# Patient Record
Sex: FEMALE | Race: WHITE | NOT HISPANIC OR LATINO | Employment: OTHER | URBAN - METROPOLITAN AREA
[De-identification: names, ages, dates, MRNs, and addresses within clinical notes are randomized per-mention and may not be internally consistent; named-entity substitution may affect disease eponyms.]

---

## 2017-02-05 RX ORDER — LOSARTAN POTASSIUM 25 MG/1
TABLET ORAL
Qty: 30 TABLET | Refills: 9 | Status: SHIPPED | OUTPATIENT
Start: 2017-02-05 | End: 2017-09-07 | Stop reason: SDUPTHER

## 2017-05-15 ENCOUNTER — PATIENT MESSAGE (OUTPATIENT)
Dept: ENDOCRINOLOGY | Facility: CLINIC | Age: 67
End: 2017-05-15

## 2017-05-15 DIAGNOSIS — E03.9 HYPOTHYROIDISM, UNSPECIFIED TYPE: Primary | ICD-10-CM

## 2017-05-16 ENCOUNTER — TELEPHONE (OUTPATIENT)
Dept: ENDOCRINOLOGY | Facility: CLINIC | Age: 67
End: 2017-05-16

## 2017-05-16 NOTE — TELEPHONE ENCOUNTER
Pt was scheduled for August w/ Dr. Walsh w/ labs/US prior.  Pt states she's been feeling more tired than usual and would like levels checked.  TSH ordered for tomorrow.  Will call pt w/ results.  Pt verbalized understanding.

## 2017-05-17 ENCOUNTER — LAB VISIT (OUTPATIENT)
Dept: LAB | Facility: HOSPITAL | Age: 67
End: 2017-05-17
Attending: INTERNAL MEDICINE
Payer: COMMERCIAL

## 2017-05-17 DIAGNOSIS — I10 ESSENTIAL HYPERTENSION: ICD-10-CM

## 2017-05-17 LAB — TSH SERPL DL<=0.005 MIU/L-ACNC: 1.76 UIU/ML

## 2017-05-17 PROCEDURE — 84443 ASSAY THYROID STIM HORMONE: CPT

## 2017-05-17 PROCEDURE — 36415 COLL VENOUS BLD VENIPUNCTURE: CPT | Mod: PO

## 2017-06-11 RX ORDER — PANTOPRAZOLE SODIUM 40 MG/1
TABLET, DELAYED RELEASE ORAL
Qty: 30 TABLET | Refills: 5 | Status: SHIPPED | OUTPATIENT
Start: 2017-06-11 | End: 2018-01-29 | Stop reason: SDUPTHER

## 2017-07-06 ENCOUNTER — OFFICE VISIT (OUTPATIENT)
Dept: INTERNAL MEDICINE | Facility: CLINIC | Age: 67
End: 2017-07-06
Payer: MEDICARE

## 2017-07-06 VITALS
HEIGHT: 66 IN | HEART RATE: 73 BPM | OXYGEN SATURATION: 98 % | RESPIRATION RATE: 16 BRPM | SYSTOLIC BLOOD PRESSURE: 132 MMHG | WEIGHT: 148.56 LBS | BODY MASS INDEX: 23.88 KG/M2 | TEMPERATURE: 98 F | DIASTOLIC BLOOD PRESSURE: 78 MMHG

## 2017-07-06 DIAGNOSIS — I70.0 AORTIC ATHEROSCLEROSIS: ICD-10-CM

## 2017-07-06 DIAGNOSIS — Z78.0 ASYMPTOMATIC MENOPAUSAL STATE: ICD-10-CM

## 2017-07-06 DIAGNOSIS — Z00.00 HEALTH CARE MAINTENANCE: Primary | ICD-10-CM

## 2017-07-06 DIAGNOSIS — K21.9 GASTROESOPHAGEAL REFLUX DISEASE, ESOPHAGITIS PRESENCE NOT SPECIFIED: ICD-10-CM

## 2017-07-06 DIAGNOSIS — Z12.31 ENCOUNTER FOR SCREENING MAMMOGRAM FOR MALIGNANT NEOPLASM OF BREAST: ICD-10-CM

## 2017-07-06 PROCEDURE — 90670 PCV13 VACCINE IM: CPT | Mod: PBBFAC,PO

## 2017-07-06 PROCEDURE — 99213 OFFICE O/P EST LOW 20 MIN: CPT | Mod: PBBFAC,PO | Performed by: INTERNAL MEDICINE

## 2017-07-06 PROCEDURE — G0438 PPPS, INITIAL VISIT: HCPCS | Mod: ,,, | Performed by: INTERNAL MEDICINE

## 2017-07-06 PROCEDURE — 99999 PR PBB SHADOW E&M-EST. PATIENT-LVL III: CPT | Mod: PBBFAC,,, | Performed by: INTERNAL MEDICINE

## 2017-07-06 PROCEDURE — G0009 ADMIN PNEUMOCOCCAL VACCINE: HCPCS | Mod: PBBFAC

## 2017-07-06 NOTE — PROGRESS NOTES
"HISTORY OF PRESENT ILLNESS:  Pt. is a 66 y.o. female presents for monitoring of her HTN, GERD.  She also had been to have her sleep study for ANG/now on CPAP.  Her "numbers look good", much improved from previous.  She is due for her mammogram, will be coming due for BMD, colonoscopy done 6/2/15.  She is UTD with Tdap, done 6/28/16.  She is due for pneumonia vaccines.  She was supposed to see Dr. Verona Gupta, but do not see that she had appt.  She had problems and had to postpone visit.  Pap was done 1/12/16.  Colonoscopy done 6/2/15 through Dr. Heller.  She has recently contacted Dr. Walsh for her followup of her MNG, had been feeling tired, but TSH was WNL.  She has history of atherosclerosis of the aorta, last .    Lab Results   Component Value Date    WBC 6.20 12/19/2016    HGB 13.2 12/19/2016    HCT 41.2 12/19/2016     12/19/2016    CHOL 213 (H) 12/19/2016    TRIG 55 12/19/2016    HDL 70 12/19/2016    ALT 9 (L) 12/19/2016    AST 17 12/19/2016     12/19/2016    K 3.9 12/19/2016     12/19/2016    CREATININE 0.9 12/19/2016    BUN 10 12/19/2016    CO2 25 12/19/2016    TSH 1.762 05/17/2017    INR 1.0 07/11/2012     Lab Results   Component Value Date    LDLCALC 132.0 12/19/2016             ROS:  GENERAL: No fever, chills, fatigability or weight loss.  SKIN: No rashes, itching or changes in color or texture of skin.  HEAD: No headaches or recent head trauma.  EARS: Denies ear pain, discharge or vertigo.  NOSE: No loss of smell, no epistaxis or postnasal drip.  MOUTH & THROAT: No hoarseness or change in voice. No excessive gum bleeding.  NODES: Denies swollen glands.  CHEST: Denies RODRIGUEZ, cyanosis, wheezing, cough and sputum production.  CARDIOVASCULAR: Denies chest pain, PND, orthopnea or reduced exercise tolerance.  ABDOMEN: Appetite fine. No weight loss. Denies constipation, diarrhea, abdominal pain, hematemesis or blood in stool.  URINARY: No flank pain, dysuria or hematuria.  PERIPHERAL " VASCULAR: No claudication or cyanosis. No edema.  MUSCULOSKELETAL: No joint stiffness or swelling. Denies back pain.  NEUROLOGIC: Denies numbness    PE:   Vitals:   Vitals:    07/06/17 1755   BP: 132/78   Pulse: 73   Resp: 16   Temp: 97.7 °F (36.5 °C)     GENERAL: no acute distress, A&Ox3, comfortable.  Female with BMI of 23   HEENT: tympanic membranes clear, nasal mucosa pink, no pharyngeal erythema or exudate  NECK: supple, no cervical lymphadenopathy, no thyromegaly; no supraclavicular nodes;   CHEST:  clear to auscultation bilaterally, no crackles or wheeze; no increased work of breathing;  CARDIOVASCULAR: regular rate and rhythm, no rubs, murmurs or gallops.  ABDOMEN: normal bowel sounds, soft non-tender, non-distended; no palpable organomegaly;   EXT: no clubbing, cyanosis or edema.     ASSESSMENT/PLAN:    Health care maintenance  -     Mammo Digital Screening Bilat with CAD; Future; Expected date: 07/06/2017  -     DXA Bone Density Spine And Hip; Future; Expected date: 10/27/2017  -     (In Office Administered) Pneumococcal Conjugate Vaccine (13 Valent) (IM)    Encounter for screening mammogram for malignant neoplasm of breast   -     Mammo Digital Screening Bilat with CAD; Future; Expected date: 07/06/2017    Asymptomatic menopausal state   -     DXA Bone Density Spine And Hip; Future; Expected date: 10/27/2017      Call if condition changes or worsens.    Answers for HPI/ROS submitted by the patient on 7/3/2017   activity change: No  unexpected weight change: No  neck pain: No  hearing loss: No  rhinorrhea: No  trouble swallowing: No  eye discharge: No  visual disturbance: No  chest tightness: No  wheezing: No  chest pain: No  palpitations: No  blood in stool: No  constipation: No  vomiting: No  diarrhea: No  polydipsia: No  polyuria: No  difficulty urinating: No  hematuria: No  menstrual problem: No  dysuria: No  joint swelling: No  arthralgias: No  headaches: No  weakness: No  confusion: No  dysphoric  mood: No

## 2017-08-09 ENCOUNTER — HOSPITAL ENCOUNTER (OUTPATIENT)
Dept: RADIOLOGY | Facility: HOSPITAL | Age: 67
Discharge: HOME | End: 2017-08-09
Attending: INTERNAL MEDICINE
Payer: MEDICARE

## 2017-08-09 DIAGNOSIS — Z12.31 ENCOUNTER FOR SCREENING MAMMOGRAM FOR MALIGNANT NEOPLASM OF BREAST: ICD-10-CM

## 2017-08-09 DIAGNOSIS — Z00.00 HEALTH CARE MAINTENANCE: ICD-10-CM

## 2017-08-09 PROCEDURE — 77067 SCR MAMMO BI INCL CAD: CPT | Mod: 26,,, | Performed by: RADIOLOGY

## 2017-08-09 PROCEDURE — 77067 SCR MAMMO BI INCL CAD: CPT | Mod: TC

## 2017-08-09 PROCEDURE — 77063 BREAST TOMOSYNTHESIS BI: CPT | Mod: 26,,, | Performed by: RADIOLOGY

## 2017-08-15 DIAGNOSIS — E03.9 HYPOTHYROIDISM, UNSPECIFIED TYPE: Primary | ICD-10-CM

## 2017-08-18 ENCOUNTER — HOSPITAL ENCOUNTER (OUTPATIENT)
Dept: RADIOLOGY | Facility: HOSPITAL | Age: 67
Discharge: HOME | End: 2017-08-18
Attending: INTERNAL MEDICINE
Payer: MEDICARE

## 2017-08-18 DIAGNOSIS — E03.9 HYPOTHYROIDISM, UNSPECIFIED TYPE: ICD-10-CM

## 2017-08-18 PROCEDURE — 76536 US EXAM OF HEAD AND NECK: CPT | Mod: TC,PO

## 2017-08-18 PROCEDURE — 76536 US EXAM OF HEAD AND NECK: CPT | Mod: 26,,, | Performed by: RADIOLOGY

## 2017-08-23 ENCOUNTER — OFFICE VISIT (OUTPATIENT)
Dept: ENDOCRINOLOGY | Facility: CLINIC | Age: 67
End: 2017-08-23
Payer: MEDICARE

## 2017-08-23 VITALS
BODY MASS INDEX: 24.13 KG/M2 | WEIGHT: 150.13 LBS | HEART RATE: 61 BPM | HEIGHT: 66 IN | DIASTOLIC BLOOD PRESSURE: 68 MMHG | SYSTOLIC BLOOD PRESSURE: 160 MMHG

## 2017-08-23 DIAGNOSIS — E04.1 THYROID NODULE: Primary | ICD-10-CM

## 2017-08-23 DIAGNOSIS — I10 ESSENTIAL HYPERTENSION: ICD-10-CM

## 2017-08-23 PROCEDURE — 99212 OFFICE O/P EST SF 10 MIN: CPT | Mod: PBBFAC,PO | Performed by: INTERNAL MEDICINE

## 2017-08-23 PROCEDURE — 99999 PR PBB SHADOW E&M-EST. PATIENT-LVL II: CPT | Mod: PBBFAC,,, | Performed by: INTERNAL MEDICINE

## 2017-08-23 PROCEDURE — 99213 OFFICE O/P EST LOW 20 MIN: CPT | Mod: S$PBB,,, | Performed by: INTERNAL MEDICINE

## 2017-08-23 PROCEDURE — 3077F SYST BP >= 140 MM HG: CPT | Mod: ,,, | Performed by: INTERNAL MEDICINE

## 2017-08-23 PROCEDURE — 3078F DIAST BP <80 MM HG: CPT | Mod: ,,, | Performed by: INTERNAL MEDICINE

## 2017-08-23 RX ORDER — MULTIVIT WITH MINERALS/HERBS
1 TABLET ORAL
COMMUNITY
End: 2023-06-06

## 2017-08-23 RX ORDER — MULTIVITAMIN
1 TABLET ORAL
COMMUNITY

## 2017-08-23 NOTE — PROGRESS NOTES
CHIEF COMPLAINT: Multinodular Goiter  66 year old being seen as a f/u. Benign left FNA 1/7/2016. No XRT to head/neck. No Difficulty swallowing. No change in voice. No palpitations. No tremors.         PAST MEDICAL HISTORY/PAST SURGICAL HISTORY:  Reviewed in [facility]    SOCIAL HISTORY: No T/A    FAMILY HISTORY:  Father was on T4 replacement. No thyroid cancer. + DM 2.     MEDICATIONS/ALLERGIES: The patient's MedCard has been updated and reviewed.      ROS:   Constitutional: No recent significant weight change  Eyes: No recent visual changes  ENT: No dysphagia  Cardiovascular: Denies current anginal symptoms  Respiratory: Denies current respiratory difficulty  Gastrointestinal: Denies recent bowel disturbances  GenitoUrinary - No dysuria  Skin: No new skin rash  Neurologic: No focal neurologic complaints  Remainder ROS negative        PE:    GENERAL: Well developed, well nourished.  NECK: Supple, trachea midline, Left nodule palpabel  CHEST: Resp even and unlabored, CTA bilateral.  CARDIAC: RRR, S1, S2 heard, no murmurs, rubs, S3, or S4    Results for SHELTON SMITH (MRN [id number]) as of 8/23/2017 10:40   Ref. Range 8/18/2017 08:55   TSH Latest Ref Range: 0.400 - 4.000 uIU/mL 2.500       THYROID US:  Comparison is made to the prior thyroid ultrasound dated 11/24/15.    The thyroid gland is normal in size with a total overall volume of 14.2 cc. The left lobe is over 2 times larger than the right. The right lobe measures 4.1 x 1.3 x 1.2 cm and the left lobe measures 4.6 x 2.2 x 2.0 cm.    A heterogeneous circumscribed nodule with microcalcifications and several tiny cystic components is present in the left thyroid lobe occupying the mid and lower portions. Allowing for differences in measurement technique, this nodule is unchanged in size and measures 3.3 x 2.2 x 2.1 cm. This nodule has undergone prior biopsy, yielding benign results. A small benign appearing nodule measuring 4 mm is present in the mid right thyroid lobe which  is unchanged. Tiny cysts are present in the right thyroid lobe which are unchanged. No new solid nodules have developed.    The remainder of the soft tissues of the neck are unremarkable and no lymphadenopathy is present.   Impression      Multinodular thyroid gland with a relatively large nodule in the left thyroid lobe remaining unchanged from the prior study.           ASSESSMENT/PLAN:  1. Thyroid Nodule- Benign Left FNA in 2016. US shows no change     2. HTN- She is in pain today after pulling a muscle. She states that she monitors at home. Advised that she f/u with PCP      FOLLOWUP  F/U 1 year TSH, Thyroid

## 2017-09-10 RX ORDER — LOSARTAN POTASSIUM 25 MG/1
25 TABLET ORAL DAILY
Qty: 90 TABLET | Refills: 3 | Status: SHIPPED | OUTPATIENT
Start: 2017-09-10 | End: 2018-07-02 | Stop reason: SDUPTHER

## 2017-10-13 ENCOUNTER — LAB VISIT (OUTPATIENT)
Dept: LAB | Facility: HOSPITAL | Age: 67
End: 2017-10-13
Attending: NURSE PRACTITIONER
Payer: MEDICARE

## 2017-10-13 ENCOUNTER — OFFICE VISIT (OUTPATIENT)
Dept: PRIMARY CARE CLINIC | Facility: CLINIC | Age: 67
End: 2017-10-13
Payer: MEDICARE

## 2017-10-13 VITALS
HEART RATE: 71 BPM | WEIGHT: 148.81 LBS | SYSTOLIC BLOOD PRESSURE: 132 MMHG | TEMPERATURE: 99 F | DIASTOLIC BLOOD PRESSURE: 84 MMHG | BODY MASS INDEX: 23.92 KG/M2 | HEIGHT: 66 IN

## 2017-10-13 DIAGNOSIS — R19.7 DIARRHEA, UNSPECIFIED TYPE: ICD-10-CM

## 2017-10-13 DIAGNOSIS — R11.0 NAUSEA: Primary | ICD-10-CM

## 2017-10-13 DIAGNOSIS — R11.0 NAUSEA: ICD-10-CM

## 2017-10-13 DIAGNOSIS — R31.29 MICROSCOPIC HEMATURIA: ICD-10-CM

## 2017-10-13 LAB
ALBUMIN SERPL BCP-MCNC: 4 G/DL
ALP SERPL-CCNC: 47 U/L
ALT SERPL W/O P-5'-P-CCNC: 12 U/L
ANION GAP SERPL CALC-SCNC: 13 MMOL/L
AST SERPL-CCNC: 19 U/L
BACTERIA #/AREA URNS HPF: ABNORMAL /HPF
BASOPHILS # BLD AUTO: 0.02 K/UL
BASOPHILS NFR BLD: 0.4 %
BILIRUB SERPL-MCNC: 0.5 MG/DL
BILIRUB UR QL STRIP: NEGATIVE
BUN SERPL-MCNC: 16 MG/DL
CALCIUM SERPL-MCNC: 10.1 MG/DL
CHLORIDE SERPL-SCNC: 102 MMOL/L
CLARITY UR: CLEAR
CO2 SERPL-SCNC: 28 MMOL/L
COLOR UR: YELLOW
CREAT SERPL-MCNC: 0.9 MG/DL
DIFFERENTIAL METHOD: ABNORMAL
EOSINOPHIL # BLD AUTO: 0.1 K/UL
EOSINOPHIL NFR BLD: 1.3 %
ERYTHROCYTE [DISTWIDTH] IN BLOOD BY AUTOMATED COUNT: 14.1 %
EST. GFR  (AFRICAN AMERICAN): >60 ML/MIN/1.73 M^2
EST. GFR  (NON AFRICAN AMERICAN): >60 ML/MIN/1.73 M^2
GLUCOSE SERPL-MCNC: 96 MG/DL
GLUCOSE UR QL STRIP: NEGATIVE
HCT VFR BLD AUTO: 37.9 %
HGB BLD-MCNC: 12.7 G/DL
HGB UR QL STRIP: ABNORMAL
KETONES UR QL STRIP: NEGATIVE
LEUKOCYTE ESTERASE UR QL STRIP: NEGATIVE
LYMPHOCYTES # BLD AUTO: 1.9 K/UL
LYMPHOCYTES NFR BLD: 34.4 %
MCH RBC QN AUTO: 28.4 PG
MCHC RBC AUTO-ENTMCNC: 33.5 G/DL
MCV RBC AUTO: 85 FL
MICROSCOPIC COMMENT: ABNORMAL
MONOCYTES # BLD AUTO: 0.5 K/UL
MONOCYTES NFR BLD: 9.6 %
NEUTROPHILS # BLD AUTO: 3 K/UL
NEUTROPHILS NFR BLD: 54.3 %
NITRITE UR QL STRIP: NEGATIVE
PH UR STRIP: 6 [PH] (ref 5–8)
PLATELET # BLD AUTO: 242 K/UL
PMV BLD AUTO: 8.2 FL
POTASSIUM SERPL-SCNC: 4.7 MMOL/L
PROT SERPL-MCNC: 7.4 G/DL
PROT UR QL STRIP: NEGATIVE
RBC # BLD AUTO: 4.47 M/UL
RBC #/AREA URNS HPF: 5 /HPF (ref 0–4)
SODIUM SERPL-SCNC: 143 MMOL/L
SP GR UR STRIP: 1.02 (ref 1–1.03)
SQUAMOUS #/AREA URNS HPF: 2 /HPF
URN SPEC COLLECT METH UR: ABNORMAL
WBC # BLD AUTO: 5.52 K/UL
WBC #/AREA URNS HPF: 1 /HPF (ref 0–5)

## 2017-10-13 PROCEDURE — 81000 URINALYSIS NONAUTO W/SCOPE: CPT | Mod: PO

## 2017-10-13 PROCEDURE — 80053 COMPREHEN METABOLIC PANEL: CPT | Mod: PO

## 2017-10-13 PROCEDURE — 36415 COLL VENOUS BLD VENIPUNCTURE: CPT | Mod: PO

## 2017-10-13 PROCEDURE — 99213 OFFICE O/P EST LOW 20 MIN: CPT | Mod: PBBFAC,PO | Performed by: NURSE PRACTITIONER

## 2017-10-13 PROCEDURE — 85025 COMPLETE CBC W/AUTO DIFF WBC: CPT | Mod: PO

## 2017-10-13 PROCEDURE — 99999 PR PBB SHADOW E&M-EST. PATIENT-LVL III: CPT | Mod: PBBFAC,,, | Performed by: NURSE PRACTITIONER

## 2017-10-13 PROCEDURE — 99214 OFFICE O/P EST MOD 30 MIN: CPT | Mod: S$PBB,,, | Performed by: NURSE PRACTITIONER

## 2017-10-13 RX ORDER — ONDANSETRON 8 MG/1
8 TABLET, ORALLY DISINTEGRATING ORAL EVERY 8 HOURS PRN
Qty: 12 TABLET | Refills: 0 | Status: SHIPPED | OUTPATIENT
Start: 2017-10-13 | End: 2022-03-30

## 2017-10-13 NOTE — PATIENT INSTRUCTIONS
The exam today shows normal lung,  Heart and abdominal exam.  We'll do some labs to get more information and call you with the results.  Use the zofran as needed.  Diet as tolerated.    If you have any questions, please call.  You can reach us at [phone] Monday through Friday (except holidays) 12 nooon to 6 p.m.    Thank you for using the [facility]!    YANE Garcia, CNP, FNP-BC  [facility]  [facility]

## 2017-10-13 NOTE — Clinical Note
Ramona Manriquez MD,  I saw your patient today in the [facility].  If you have any questions, please do not hesitate to contact me.  Thank you!  MADISON Garcia

## 2017-10-20 ENCOUNTER — TELEPHONE (OUTPATIENT)
Dept: PRIMARY CARE CLINIC | Facility: CLINIC | Age: 67
End: 2017-10-20

## 2017-10-20 ENCOUNTER — TELEPHONE (OUTPATIENT)
Dept: UROLOGY | Facility: CLINIC | Age: 67
End: 2017-10-20

## 2017-10-20 ENCOUNTER — LAB VISIT (OUTPATIENT)
Dept: LAB | Facility: HOSPITAL | Age: 67
End: 2017-10-20
Attending: NURSE PRACTITIONER
Payer: MEDICARE

## 2017-10-20 DIAGNOSIS — R31.29 HEMATURIA, MICROSCOPIC: ICD-10-CM

## 2017-10-20 DIAGNOSIS — R31.29 HEMATURIA, MICROSCOPIC: Primary | ICD-10-CM

## 2017-10-20 LAB
BACTERIA #/AREA URNS HPF: ABNORMAL /HPF
BILIRUB UR QL STRIP: NEGATIVE
CLARITY UR: CLEAR
COLOR UR: YELLOW
GLUCOSE UR QL STRIP: NEGATIVE
HGB UR QL STRIP: ABNORMAL
KETONES UR QL STRIP: ABNORMAL
LEUKOCYTE ESTERASE UR QL STRIP: NEGATIVE
MICROSCOPIC COMMENT: ABNORMAL
NITRITE UR QL STRIP: NEGATIVE
PH UR STRIP: 6 [PH] (ref 5–8)
PROT UR QL STRIP: NEGATIVE
RBC #/AREA URNS HPF: 5 /HPF (ref 0–4)
SP GR UR STRIP: >=1.03 (ref 1–1.03)
URN SPEC COLLECT METH UR: ABNORMAL
WBC #/AREA URNS HPF: 1 /HPF (ref 0–5)

## 2017-10-20 PROCEDURE — 81000 URINALYSIS NONAUTO W/SCOPE: CPT | Mod: PO

## 2017-10-20 PROCEDURE — 87086 URINE CULTURE/COLONY COUNT: CPT

## 2017-10-20 NOTE — TELEPHONE ENCOUNTER
Called pt, notified of note per Sarah Watkins NP she verbally understood. She stated she received a call from urology to schedule appt but they are awaiting urine culture. She scheduled appt but she would like to come and drop off urine today to have a culture done. Please advise.

## 2017-10-20 NOTE — TELEPHONE ENCOUNTER
The pt is awaiting a urine culture result, which I intended to do in the small chance that the microscopic hematuria was r/t infection.  But it didn't get done.  Nonetheless the urology referral is important, as she's got microscopic blood in her urine.  Thank you!

## 2017-10-20 NOTE — TELEPHONE ENCOUNTER
I intended to do a f/u urine culture to be sure there was no infection.  We will call and let her know the order wasn't done but that she should still go to the urologist for microscopic hematuria.  Thank you for letting me know.   Joslyn WALLACE

## 2017-10-20 NOTE — TELEPHONE ENCOUNTER
I was calling the pt to get her scheduled, off of the referral. Pt was waiting on a call back from your office about a urine culture. I do not see that a urine culture was ordered. ( I did not tell the pt that information) Please advise the pt. She wanted to know if the micro hematuria could just be an infection, before booking an apt with urology. Thank you.

## 2017-10-21 LAB — BACTERIA UR CULT: NO GROWTH

## 2017-10-25 ENCOUNTER — HOSPITAL ENCOUNTER (OUTPATIENT)
Dept: RADIOLOGY | Facility: HOSPITAL | Age: 67
Discharge: HOME | End: 2017-10-25
Attending: INTERNAL MEDICINE
Payer: MEDICARE

## 2017-10-25 DIAGNOSIS — Z00.00 HEALTH CARE MAINTENANCE: ICD-10-CM

## 2017-10-25 DIAGNOSIS — Z78.0 ASYMPTOMATIC MENOPAUSAL STATE: ICD-10-CM

## 2017-10-25 PROCEDURE — 77080 DXA BONE DENSITY AXIAL: CPT | Mod: 26,,, | Performed by: RADIOLOGY

## 2017-10-25 PROCEDURE — 77080 DXA BONE DENSITY AXIAL: CPT | Mod: TC,PO

## 2017-11-01 ENCOUNTER — OFFICE VISIT (OUTPATIENT)
Dept: UROLOGY | Facility: CLINIC | Age: 67
End: 2017-11-01
Payer: MEDICARE

## 2017-11-01 ENCOUNTER — HOSPITAL ENCOUNTER (OUTPATIENT)
Dept: RADIOLOGY | Facility: HOSPITAL | Age: 67
Discharge: HOME | End: 2017-11-01
Attending: UROLOGY
Payer: MEDICARE

## 2017-11-01 VITALS
HEIGHT: 66 IN | BODY MASS INDEX: 23.84 KG/M2 | SYSTOLIC BLOOD PRESSURE: 152 MMHG | DIASTOLIC BLOOD PRESSURE: 74 MMHG | WEIGHT: 148.38 LBS | HEART RATE: 70 BPM

## 2017-11-01 DIAGNOSIS — R31.9 HEMATURIA, UNSPECIFIED TYPE: ICD-10-CM

## 2017-11-01 DIAGNOSIS — R31.9 HEMATURIA, UNSPECIFIED TYPE: Primary | ICD-10-CM

## 2017-11-01 LAB
BILIRUB SERPL-MCNC: NORMAL MG/DL
BLOOD URINE, POC: NORMAL
COLOR, POC UA: YELLOW
GLUCOSE UR QL STRIP: NORMAL
KETONES UR QL STRIP: NORMAL
LEUKOCYTE ESTERASE URINE, POC: NORMAL
NITRITE, POC UA: NORMAL
PH, POC UA: 5
PROTEIN, POC: NORMAL
SPECIFIC GRAVITY, POC UA: 1
UROBILINOGEN, POC UA: NORMAL

## 2017-11-01 PROCEDURE — 76770 US EXAM ABDO BACK WALL COMP: CPT | Mod: TC,PO

## 2017-11-01 PROCEDURE — 99204 OFFICE O/P NEW MOD 45 MIN: CPT | Mod: S$PBB,,, | Performed by: UROLOGY

## 2017-11-01 PROCEDURE — 81002 URINALYSIS NONAUTO W/O SCOPE: CPT | Mod: PBBFAC,PO | Performed by: UROLOGY

## 2017-11-01 PROCEDURE — 99213 OFFICE O/P EST LOW 20 MIN: CPT | Mod: PBBFAC,25,PO | Performed by: UROLOGY

## 2017-11-01 PROCEDURE — 88112 CYTOPATH CELL ENHANCE TECH: CPT | Performed by: PATHOLOGY

## 2017-11-01 PROCEDURE — 88112 CYTOPATH CELL ENHANCE TECH: CPT | Mod: 26,,, | Performed by: PATHOLOGY

## 2017-11-01 PROCEDURE — 76770 US EXAM ABDO BACK WALL COMP: CPT | Mod: 26,,, | Performed by: RADIOLOGY

## 2017-11-01 PROCEDURE — 99999 PR PBB SHADOW E&M-EST. PATIENT-LVL III: CPT | Mod: PBBFAC,,, | Performed by: UROLOGY

## 2017-11-01 NOTE — PROGRESS NOTES
UROLOGY [place]  11 1 17    Urinalysis: col yellow, sg 00, pH 5, leuco -, nitrites -, prot -, glucose -, bili -, blood -  Was told there was microscopic presence of blood in her urine. Pt was being checked for diarrhea and nausea and among the tests was the urinalysis. She has no hx of hematuria before. Her urinary sediment had 5 rbc/hpf (normal 0-4).    Other lab was normal, with wbc 5.5, hct 37.9, plat 242, creat 0.9, bili 0.5, glu 96, electrolytes normal    PMH    Surgical:  has a past surgical history that includes Tonsillectomy and Colonoscopy (6/15).    Medical:  has a past medical history of GERD (gastroesophageal reflux disease); Hemorrhoid; Hypertension; Mild vitamin D deficiency; and Osteopenia.    Familial: no fh of renal disease, no fh of kidney stones    Social: retired , lives in [place]    Current Outpatient Prescriptions on File Prior to Visit   Medication Sig Dispense Refill    b complex vitamins tablet Take 1 tablet by mouth as needed.       CALCIUM CARBONATE/VITAMIN D3 (VITAMIN D-3 ORAL) Take 1 tablet by mouth once daily.       losartan (COZAAR) 25 MG tablet Take 1 tablet (25 mg total) by mouth once daily. 90 tablet 3    multivitamin (ONE DAILY MULTIVITAMIN) per tablet Take 1 tablet by mouth as needed.       ondansetron (ZOFRAN-ODT) 8 MG TbDL Take 1 tablet (8 mg total) by mouth every 8 (eight) hours as needed. 12 tablet 0    pantoprazole (PROTONIX) 40 MG tablet TAKE 1 TABLET BY MOUTH EVERY  30 tablet 5     REVIEW OF SYSTEMS  GENERAL: No complaints of fatigue. No headaches or dizzy spells.   HEENT: vision preserved. Sinuses: No complaints.   CARDIOPULMONARY: No swelling of the legs; no chest pain. No shortness of breath, no wheezing.   GASTROINTESTINAL: occasional heartburn. Denies diarrhea; denies constipation, no blood or mucus in stools.   GENITOURINARY: has microhematuria. Denies dysuria, bleeding or incontinence.   MUSCULOSKELETAL: some arthritic complaints such  as pain or stiffness.   PSYCHIATRIC: No history of depression or anxiety.   ENDOCRINOLOGIC: No reports of sweating, cold or heat intolerance. No polyuria or polydipsia.   NEUROLOGICAL: No headache, dizziness, syncope, paralysis  LYMPHATICS: No enlarged nodes. No history of splenectomy.  ==    Pt alert, oriented, cooperative, no distress  HEENT: wnl  Neck: supple, no JVD, no lymphadenopathy  Chest: CV NSR  Lungs: normal auscultation  Abdomen flat, nontender, no organomegaly, no masses.  No hernias  Extremities: no edema, peripheral pulses nl  Neuro: preserved    IMP  Microhematuria  Will do cystoscopy, urine cytology, upper tract imaging

## 2017-11-01 NOTE — LETTER
November 5, 2017      Sarah Watkins, GENI  [address] [place] [id number]           [facility]  [address] [facility] [address] [place] [address]  Phone: [phone]          Patient: Mindi Agudelo   MR Number: [id number]   YOB: 1950   Date of Visit: 11/1/2017       Dear Sarah Watkins:    Thank you for referring Mindi Agudelo to me for evaluation. Attached you will find relevant portions of my assessment and plan of care.    If you have questions, please do not hesitate to call me. I look forward to following Mindi Agudelo along with you.    Sincerely,    Reji Siddiqui MD    Enclosure  CC:  No Recipients    If you would like to receive this communication electronically, please contact [email or web address] or [phone] to request more information on [employer] Link access.    For providers and/or their staff who would like to refer a patient to [facility], please contact us through our one-stop-shop provider referral line, Antoni Quispe, at [phone].    If you feel you have received this communication in error or would no longer like to receive these types of communications, please e-mail [email or web address]

## 2017-11-14 ENCOUNTER — PROCEDURE VISIT (OUTPATIENT)
Dept: UROLOGY | Facility: CLINIC | Age: 67
End: 2017-11-14
Payer: MEDICARE

## 2017-11-14 VITALS
SYSTOLIC BLOOD PRESSURE: 125 MMHG | WEIGHT: 151 LBS | BODY MASS INDEX: 24.27 KG/M2 | HEART RATE: 70 BPM | HEIGHT: 66 IN | DIASTOLIC BLOOD PRESSURE: 69 MMHG

## 2017-11-14 DIAGNOSIS — R31.29 MICROSCOPIC HEMATURIA: Primary | ICD-10-CM

## 2017-11-14 DIAGNOSIS — R31.9 HEMATURIA, UNSPECIFIED TYPE: ICD-10-CM

## 2017-11-14 DIAGNOSIS — R33.9 INCOMPLETE BLADDER EMPTYING: ICD-10-CM

## 2017-11-14 LAB
BILIRUB SERPL-MCNC: NORMAL MG/DL
BLOOD URINE, POC: NORMAL
COLOR, POC UA: YELLOW
GLUCOSE UR QL STRIP: NORMAL
KETONES UR QL STRIP: NORMAL
LEUKOCYTE ESTERASE URINE, POC: NORMAL
NITRITE, POC UA: NORMAL
PH, POC UA: 5
POC RESIDUAL URINE VOLUME: 14 ML (ref 0–100)
PROTEIN, POC: NORMAL
SPECIFIC GRAVITY, POC UA: 1.01
UROBILINOGEN, POC UA: NORMAL

## 2017-11-14 PROCEDURE — 51798 US URINE CAPACITY MEASURE: CPT | Mod: PBBFAC,PO | Performed by: UROLOGY

## 2017-11-14 PROCEDURE — 81002 URINALYSIS NONAUTO W/O SCOPE: CPT | Mod: PBBFAC,PO | Performed by: UROLOGY

## 2017-11-14 PROCEDURE — 52000 CYSTOURETHROSCOPY: CPT | Mod: PBBFAC,PO | Performed by: UROLOGY

## 2017-11-14 PROCEDURE — 52000 CYSTOURETHROSCOPY: CPT | Mod: S$PBB,,, | Performed by: UROLOGY

## 2017-11-14 NOTE — PROGRESS NOTES
UROLOGY [place]  11 14 17    c-c microhematuria    Age 67, comes in to have a urologic w/u for microhematuria    CYSTOSCOPY olympus flexible. Urethra normal, with no stricture or diverticulum. Bladder cavity distends symmetrically and equally when distended with water. Mucosal layer with no lesions. No abnormal trabeculation. Location and shape of the ureteral orifices normal. Pt tolerated the procedure well.     BLADDERSCAN ULTRASOUND  14  ml residual    RENAL ULTRASOUND  Ultrasound both kidneys  The right kidney measures 9.0 by 3.9 x 4.4 cm in size.  It demonstrates no obvious evidence of obstruction.  The left kidney measures 10.2 x 4.6 x 4.2 cm in size.  It demonstrates no obvious evidence of obstruction.     Neither kidney demonstrates evidence of obstruction.     URINE CYTOLOGY: negative for malignant cells    IMP  Microhematuria, no disease found.  Pt reassured

## 2017-12-06 ENCOUNTER — LAB VISIT (OUTPATIENT)
Dept: LAB | Facility: HOSPITAL | Age: 67
End: 2017-12-06
Attending: INTERNAL MEDICINE
Payer: MEDICARE

## 2017-12-06 DIAGNOSIS — K21.9 GASTROESOPHAGEAL REFLUX DISEASE, ESOPHAGITIS PRESENCE NOT SPECIFIED: ICD-10-CM

## 2017-12-06 DIAGNOSIS — Z00.00 HEALTH CARE MAINTENANCE: ICD-10-CM

## 2017-12-06 DIAGNOSIS — I70.0 AORTIC ATHEROSCLEROSIS: ICD-10-CM

## 2017-12-06 LAB
ALBUMIN SERPL BCP-MCNC: 3.5 G/DL
ALP SERPL-CCNC: 43 U/L
ALT SERPL W/O P-5'-P-CCNC: 10 U/L
ANION GAP SERPL CALC-SCNC: 8 MMOL/L
AST SERPL-CCNC: 17 U/L
BASOPHILS # BLD AUTO: 0.02 K/UL
BASOPHILS NFR BLD: 0.4 %
BILIRUB SERPL-MCNC: 0.6 MG/DL
BUN SERPL-MCNC: 14 MG/DL
CALCIUM SERPL-MCNC: 10.2 MG/DL
CHLORIDE SERPL-SCNC: 102 MMOL/L
CHOLEST SERPL-MCNC: 195 MG/DL
CHOLEST/HDLC SERPL: 2.7 {RATIO}
CO2 SERPL-SCNC: 31 MMOL/L
CREAT SERPL-MCNC: 0.9 MG/DL
DIFFERENTIAL METHOD: ABNORMAL
EOSINOPHIL # BLD AUTO: 0.2 K/UL
EOSINOPHIL NFR BLD: 3.1 %
ERYTHROCYTE [DISTWIDTH] IN BLOOD BY AUTOMATED COUNT: 14.1 %
EST. GFR  (AFRICAN AMERICAN): >60 ML/MIN/1.73 M^2
EST. GFR  (NON AFRICAN AMERICAN): >60 ML/MIN/1.73 M^2
GLUCOSE SERPL-MCNC: 92 MG/DL
HCT VFR BLD AUTO: 38.7 %
HDLC SERPL-MCNC: 73 MG/DL
HDLC SERPL: 37.4 %
HGB BLD-MCNC: 12.4 G/DL
IMM GRANULOCYTES # BLD AUTO: 0.01 K/UL
IMM GRANULOCYTES NFR BLD AUTO: 0.2 %
LDLC SERPL CALC-MCNC: 112.4 MG/DL
LYMPHOCYTES # BLD AUTO: 2.1 K/UL
LYMPHOCYTES NFR BLD: 37.2 %
MCH RBC QN AUTO: 28.4 PG
MCHC RBC AUTO-ENTMCNC: 32 G/DL
MCV RBC AUTO: 89 FL
MONOCYTES # BLD AUTO: 0.5 K/UL
MONOCYTES NFR BLD: 9.6 %
NEUTROPHILS # BLD AUTO: 2.7 K/UL
NEUTROPHILS NFR BLD: 49.5 %
NONHDLC SERPL-MCNC: 122 MG/DL
NRBC BLD-RTO: 0 /100 WBC
PLATELET # BLD AUTO: 252 K/UL
PMV BLD AUTO: 9.1 FL
POTASSIUM SERPL-SCNC: 4.2 MMOL/L
PROT SERPL-MCNC: 7.3 G/DL
RBC # BLD AUTO: 4.37 M/UL
SODIUM SERPL-SCNC: 141 MMOL/L
TRIGL SERPL-MCNC: 48 MG/DL
WBC # BLD AUTO: 5.51 K/UL

## 2017-12-06 PROCEDURE — 85025 COMPLETE CBC W/AUTO DIFF WBC: CPT

## 2017-12-06 PROCEDURE — 36415 COLL VENOUS BLD VENIPUNCTURE: CPT | Mod: PN

## 2017-12-06 PROCEDURE — 80053 COMPREHEN METABOLIC PANEL: CPT

## 2017-12-06 PROCEDURE — 80061 LIPID PANEL: CPT

## 2017-12-07 ENCOUNTER — OFFICE VISIT (OUTPATIENT)
Dept: OPTOMETRY | Facility: CLINIC | Age: 67
End: 2017-12-07
Payer: MEDICARE

## 2017-12-07 DIAGNOSIS — H52.03 HYPEROPIA WITH ASTIGMATISM AND PRESBYOPIA, BILATERAL: ICD-10-CM

## 2017-12-07 DIAGNOSIS — H25.13 NUCLEAR SCLEROSIS OF BOTH EYES: ICD-10-CM

## 2017-12-07 DIAGNOSIS — H43.813 POSTERIOR VITREOUS DETACHMENT OF BOTH EYES: Primary | ICD-10-CM

## 2017-12-07 DIAGNOSIS — H52.4 HYPEROPIA WITH ASTIGMATISM AND PRESBYOPIA, BILATERAL: ICD-10-CM

## 2017-12-07 DIAGNOSIS — H52.203 HYPEROPIA WITH ASTIGMATISM AND PRESBYOPIA, BILATERAL: ICD-10-CM

## 2017-12-07 DIAGNOSIS — Z13.5 GLAUCOMA SCREENING: ICD-10-CM

## 2017-12-07 PROCEDURE — 92014 COMPRE OPH EXAM EST PT 1/>: CPT | Mod: S$PBB,,, | Performed by: OPTOMETRIST

## 2017-12-07 PROCEDURE — 99213 OFFICE O/P EST LOW 20 MIN: CPT | Mod: PBBFAC,PO | Performed by: OPTOMETRIST

## 2017-12-07 PROCEDURE — 99999 PR PBB SHADOW E&M-EST. PATIENT-LVL III: CPT | Mod: PBBFAC,,, | Performed by: OPTOMETRIST

## 2017-12-07 PROCEDURE — 92015 DETERMINE REFRACTIVE STATE: CPT | Mod: ,,, | Performed by: OPTOMETRIST

## 2017-12-07 NOTE — PROGRESS NOTES
HPI     Agree above   Longstanding floaters OU, no flash  H/o vit heme w/ PVD 2016    Last edited by JONN Chong, OD on 12/7/2017  2:45 PM. (History)        ROS     Positive for: Eyes    Negative for: Constitutional, Gastrointestinal, Neurological, Skin,   Genitourinary, Musculoskeletal, HENT, Endocrine, Cardiovascular,   Respiratory, Psychiatric, Allergic/Imm, Heme/Lymph    Last edited by JONN Chong, OD on 12/7/2017  1:54 PM. (History)        Assessment /Plan     For exam results, see Encounter Report.    Posterior vitreous detachment of both eyes    Nuclear sclerosis of both eyes    Glaucoma screening    Hyperopia with astigmatism and presbyopia, bilateral      1. Longstanding OU, no evidence RD or tear  RD precautions given  2. Early changes, not vis sig for consult  3. Not suspect  4. Updated specs rx, gave copy  Discussed distance Rx for driving and tv watching--pt will see better with specs vs without  Ok continue with otc for near    Discussed and educated patient on current findings /plan.  RTC 1 year, prn if any changes / issues

## 2018-01-28 NOTE — PROGRESS NOTES
"HISTORY OF PRESENT ILLNESS:  Pt. is a 67 y.o. female presents for monitoring of her HTN, GERD.  She also had been to have her sleep study for ANG/now on CPAP, doing "ok".  She feels more rested.  Pap was done 1/12/16.  Colonoscopy done 6/2/15 through Dr. Sanchez.  Her last LDL: 112. Influenza vaccine today.    Health Maintenance Topics with due status: Not Due       Topic Last Completion Date    Colonoscopy 06/02/2015    TETANUS VACCINE 06/28/2016    Pneumococcal (65+) 07/06/2017    Mammogram 08/09/2017    DEXA SCAN 10/25/2017    Lipid Panel 12/06/2017     Health Maintenance Due   Topic Date Due    Influenza Vaccine  08/01/2017 today       Lab Results   Component Value Date    WBC 5.51 12/06/2017    HGB 12.4 12/06/2017    HCT 38.7 12/06/2017     12/06/2017    CHOL 195 12/06/2017    TRIG 48 12/06/2017    HDL 73 12/06/2017    LDLCALC 112.4 12/06/2017    ALT 10 12/06/2017    AST 17 12/06/2017     12/06/2017    K 4.2 12/06/2017     12/06/2017    CREATININE 0.9 12/06/2017    BUN 14 12/06/2017    CO2 31 (H) 12/06/2017    ALBUMIN 3.5 12/06/2017    TSH 2.500 08/18/2017    INR 1.0 07/11/2012       Past Medical History:   Diagnosis Date    GERD (gastroesophageal reflux disease)     Hemorrhoid     Hypertension     Mild vitamin D deficiency     Osteopenia        Past Surgical History:   Procedure Laterality Date    COLONOSCOPY  6/15    no polyps, Dr. Marianna Sanchez    TONSILLECTOMY         Social History     Social History    Marital status:      Spouse name: N/A    Number of children: 2    Years of education: N/A     Occupational History    retired [employer]     Social History Main Topics    Smoking status: Never Smoker    Smokeless tobacco: Never Used    Alcohol use No    Drug use: No    Sexual activity: No     Other Topics Concern    None     Social History Narrative    None       ROS:  GENERAL: No fever, chills, fatigability or weight " loss.  SKIN: No rashes, itching or changes in color or texture of skin.  HEAD: No headaches or recent head trauma.  EARS: Denies ear pain, discharge or vertigo.  NOSE: No loss of smell, no epistaxis or postnasal drip.  MOUTH & THROAT: No hoarseness or change in voice. No excessive gum bleeding.  NODES: Denies swollen glands.  CHEST: Denies RODRIGUEZ, cyanosis, wheezing, cough and sputum production.  CARDIOVASCULAR: Denies chest pain, PND, orthopnea or reduced exercise tolerance.  ABDOMEN: Appetite fine. No weight loss. Denies constipation, diarrhea, abdominal pain, hematemesis or blood in stool.  URINARY: No flank pain, dysuria or hematuria.  PERIPHERAL VASCULAR: No claudication or cyanosis. No edema.  MUSCULOSKELETAL: No joint stiffness or swelling. Denies back pain.  NEUROLOGIC: Denies numbness    PE:   Vitals:   Vitals:    01/29/18 1251   BP: 118/68   Pulse: 71     GENERAL: no acute distress, A&Ox3, comfortable.  Female with BMI of 22  HEENT: tympanic membranes clear, nasal mucosa pink, no pharyngeal erythema or exudate  NECK: supple, no cervical lymphadenopathy, no thyromegaly; no supraclavicular nodes;   CHEST:  clear to auscultation bilaterally, no crackles or wheeze; no increased work of breathing;  CARDIOVASCULAR: regular rate and rhythm, no rubs, murmurs or gallops.  ABDOMEN: normal bowel sounds, soft non-tender, non-distended; no palpable organomegaly;   EXT: no clubbing, cyanosis or edema.     ASSESSMENT/PLAN:    HTN: in control on current meds; will continue;    GERD: using PPI qod;    Health Maintenance: UTD with Prevnar 13, Tdap; will get flu shot today; UTD with mammogram and BMD;        Medication List with Changes/Refills   Current Medications    B COMPLEX VITAMINS TABLET    Take 1 tablet by mouth as needed.     CALCIUM CARBONATE/VITAMIN D3 (VITAMIN D-3 ORAL)    Take 1 tablet by mouth once daily.     LOSARTAN (COZAAR) 25 MG TABLET    Take 1 tablet (25 mg total) by mouth once daily.    MAGNESIUM 30 MG TAB     Take 500 mg by mouth once.    MULTIVITAMIN (ONE DAILY MULTIVITAMIN) PER TABLET    Take 1 tablet by mouth as needed.     ONDANSETRON (ZOFRAN-ODT) 8 MG TBDL    Take 1 tablet (8 mg total) by mouth every 8 (eight) hours as needed.    TURMERIC, BULK, MISC    450 mg by Misc.(Non-Drug; Combo Route) route once daily.   Changed and/or Refilled Medications    Modified Medication Previous Medication    PANTOPRAZOLE (PROTONIX) 40 MG TABLET pantoprazole (PROTONIX) 40 MG tablet       Take 1 tablet (40 mg total) by mouth every other day.    TAKE 1 TABLET BY MOUTH EVERY OTHER DAY     Call if condition changes or worsens.

## 2018-01-29 ENCOUNTER — OFFICE VISIT (OUTPATIENT)
Dept: INTERNAL MEDICINE | Facility: CLINIC | Age: 68
End: 2018-01-29
Payer: MEDICARE

## 2018-01-29 VITALS
OXYGEN SATURATION: 98 % | SYSTOLIC BLOOD PRESSURE: 118 MMHG | HEART RATE: 71 BPM | WEIGHT: 142.19 LBS | HEIGHT: 66 IN | DIASTOLIC BLOOD PRESSURE: 68 MMHG | BODY MASS INDEX: 22.85 KG/M2

## 2018-01-29 DIAGNOSIS — Z78.9 INEFFECTIVE HEALTH MAINTENANCE: ICD-10-CM

## 2018-01-29 DIAGNOSIS — I10 HYPERTENSION, UNSPECIFIED TYPE: Primary | ICD-10-CM

## 2018-01-29 DIAGNOSIS — K21.9 GASTROESOPHAGEAL REFLUX DISEASE, ESOPHAGITIS PRESENCE NOT SPECIFIED: ICD-10-CM

## 2018-01-29 PROCEDURE — 99213 OFFICE O/P EST LOW 20 MIN: CPT | Mod: PBBFAC,PO | Performed by: INTERNAL MEDICINE

## 2018-01-29 PROCEDURE — 99999 PR PBB SHADOW E&M-EST. PATIENT-LVL III: CPT | Mod: PBBFAC,,, | Performed by: INTERNAL MEDICINE

## 2018-01-29 PROCEDURE — 99213 OFFICE O/P EST LOW 20 MIN: CPT | Mod: S$PBB,,, | Performed by: INTERNAL MEDICINE

## 2018-01-29 RX ORDER — PANTOPRAZOLE SODIUM 40 MG/1
40 TABLET, DELAYED RELEASE ORAL EVERY OTHER DAY
Qty: 90 TABLET | Refills: 1 | Status: SHIPPED | OUTPATIENT
Start: 2018-01-29 | End: 2018-05-11 | Stop reason: SDUPTHER

## 2018-01-29 RX ORDER — MAGNESIUM 30 MG
500 TABLET ORAL ONCE
COMMUNITY

## 2018-02-20 ENCOUNTER — DOCUMENTATION ONLY (OUTPATIENT)
Dept: FAMILY MEDICINE | Facility: CLINIC | Age: 68
End: 2018-02-20

## 2018-02-20 NOTE — PROGRESS NOTES
Faxed signed orders to [facility], [facility], filed in faxed file folder. [facility]

## 2018-05-10 ENCOUNTER — TELEPHONE (OUTPATIENT)
Dept: INTERNAL MEDICINE | Facility: CLINIC | Age: 68
End: 2018-05-10

## 2018-05-11 RX ORDER — PANTOPRAZOLE SODIUM 40 MG/1
TABLET, DELAYED RELEASE ORAL
Qty: 45 TABLET | Refills: 0 | Status: SHIPPED | OUTPATIENT
Start: 2018-05-11 | End: 2019-01-14

## 2018-07-02 ENCOUNTER — OFFICE VISIT (OUTPATIENT)
Dept: FAMILY MEDICINE | Facility: CLINIC | Age: 68
End: 2018-07-02
Payer: MEDICARE

## 2018-07-02 ENCOUNTER — LAB VISIT (OUTPATIENT)
Dept: LAB | Facility: HOSPITAL | Age: 68
End: 2018-07-02
Attending: INTERNAL MEDICINE
Payer: MEDICARE

## 2018-07-02 VITALS
TEMPERATURE: 99 F | WEIGHT: 123.69 LBS | HEART RATE: 64 BPM | DIASTOLIC BLOOD PRESSURE: 64 MMHG | BODY MASS INDEX: 19.88 KG/M2 | HEIGHT: 66 IN | SYSTOLIC BLOOD PRESSURE: 122 MMHG

## 2018-07-02 DIAGNOSIS — E55.9 MILD VITAMIN D DEFICIENCY: ICD-10-CM

## 2018-07-02 DIAGNOSIS — M85.80 OSTEOPENIA, UNSPECIFIED LOCATION: ICD-10-CM

## 2018-07-02 DIAGNOSIS — I10 ESSENTIAL HYPERTENSION: Primary | ICD-10-CM

## 2018-07-02 DIAGNOSIS — I70.0 AORTIC ATHEROSCLEROSIS: ICD-10-CM

## 2018-07-02 DIAGNOSIS — I10 ESSENTIAL HYPERTENSION: ICD-10-CM

## 2018-07-02 DIAGNOSIS — K21.00 GASTROESOPHAGEAL REFLUX DISEASE WITH ESOPHAGITIS: ICD-10-CM

## 2018-07-02 LAB
25(OH)D3+25(OH)D2 SERPL-MCNC: 44 NG/ML
ANION GAP SERPL CALC-SCNC: 8 MMOL/L
BUN SERPL-MCNC: 14 MG/DL
CALCIUM SERPL-MCNC: 9.9 MG/DL
CHLORIDE SERPL-SCNC: 102 MMOL/L
CO2 SERPL-SCNC: 28 MMOL/L
CREAT SERPL-MCNC: 0.9 MG/DL
EST. GFR  (AFRICAN AMERICAN): >60 ML/MIN/1.73 M^2
EST. GFR  (NON AFRICAN AMERICAN): >60 ML/MIN/1.73 M^2
GLUCOSE SERPL-MCNC: 91 MG/DL
POTASSIUM SERPL-SCNC: 4 MMOL/L
SODIUM SERPL-SCNC: 138 MMOL/L

## 2018-07-02 PROCEDURE — 82306 VITAMIN D 25 HYDROXY: CPT

## 2018-07-02 PROCEDURE — 99214 OFFICE O/P EST MOD 30 MIN: CPT | Mod: S$PBB,,, | Performed by: INTERNAL MEDICINE

## 2018-07-02 PROCEDURE — 36415 COLL VENOUS BLD VENIPUNCTURE: CPT | Mod: PN

## 2018-07-02 PROCEDURE — 99213 OFFICE O/P EST LOW 20 MIN: CPT | Mod: PBBFAC,PN | Performed by: INTERNAL MEDICINE

## 2018-07-02 PROCEDURE — 80048 BASIC METABOLIC PNL TOTAL CA: CPT

## 2018-07-02 PROCEDURE — 99999 PR PBB SHADOW E&M-EST. PATIENT-LVL III: CPT | Mod: PBBFAC,,, | Performed by: INTERNAL MEDICINE

## 2018-07-02 RX ORDER — LOSARTAN POTASSIUM 25 MG/1
25 TABLET ORAL DAILY
Qty: 90 TABLET | Refills: 3 | Status: SHIPPED | OUTPATIENT
Start: 2018-07-02 | End: 2019-05-02 | Stop reason: SDUPTHER

## 2018-07-02 NOTE — PROGRESS NOTES
Assessment and Plan:    1. Essential hypertension  Controlled here and on home BP cuff. Will refill and update labs.   - Basic metabolic panel; Future  - losartan (COZAAR) 25 MG tablet; Take 1 tablet (25 mg total) by mouth once daily.  Dispense: 90 tablet; Refill: 3    2. Gastroesophageal reflux disease with esophagitis  No current or recent symptoms. Taking PPI every other day. Patient interested in stopping to see if symptoms return.     3. Osteopenia, unspecified location  Noted in 2017, low risk for fracture.    4. Mild vitamin D deficiency  Will recheck. Spends time outside.   - Vitamin D; Future    5. Aortic atherosclerosis  Not on statin due to low CV risk overall. Noted on previous imaging.      ______________________________________________________________________  Subjective:    Chief Complaint:  Establish care    HPI:  Mindi is a 67 y.o. year old woman here to establish care. She is a former patient of Dr. Manriquez.     She is very active, recently returned from a month long trip in [place], did a lot of [place].     HTN- Takes losartan 25 mg, due for follow up BMP.    Osteopenia- With low risk of fracture. No previous treatment.    Vitamin D Def- Replete when last checked in 2014.     GERD- Takes PPI, notes that this was started for reflux symptoms. She has had an EGD which showed no ulcers. She is currently not having any symptoms and is interested in stopping this.     Thyroid nodule- Benign FNA in 2016, last seen by Endo in Aug 2017, told to follow up in 1 year.    She has seen optometry for a history of posterior vitreous detachment of both eyes.     She has seen Urology for microhematuria, no concerning findings.     Past Medical History:  Past Medical History:   Diagnosis Date    GERD (gastroesophageal reflux disease)     Hemorrhoid     Hypertension     Mild vitamin D deficiency     Osteopenia        Past Surgical History:  Past Surgical History:   Procedure Laterality Date     COLONOSCOPY  6/15    no polyps, Dr. Marianna Sanchez    TONSILLECTOMY         Family History:  Family History   Problem Relation Age of Onset    Cataracts Father     Heart disease Father         CHF    COPD Mother         smoker    COPD Sister         smoker    Diabetes Maternal Grandmother     Diabetes Maternal Grandfather     Diabetes Paternal Grandmother     Diabetes Paternal Grandfather        Social History:  Social History     Social History    Marital status:      Spouse name: N/A    Number of children: 2    Years of education: N/A     Occupational History    retired [employer]     Social History Main Topics    Smoking status: Never Smoker    Smokeless tobacco: Never Used    Alcohol use No    Drug use: No    Sexual activity: No     Other Topics Concern    None     Social History Narrative    Retired, 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       CALCIUM CARBONATE/VITAMIN D3 (VITAMIN D-3 ORAL) Take 1 tablet by mouth once daily.       losartan (COZAAR) 25 MG tablet Take 1 tablet (25 mg total) by mouth once daily. 90 tablet 3    magnesium 30 mg Tab Take 500 mg by mouth once.      multivitamin (ONE DAILY MULTIVITAMIN) per tablet Take 1 tablet by mouth as needed.       ondansetron (ZOFRAN-ODT) 8 MG TbDL Take 1 tablet (8 mg total) by mouth every 8 (eight) hours as needed. 12 tablet 0    pantoprazole (PROTONIX) 40 MG tablet TAKE 1 TABLET BY MOUTH EVERY OTHER DAY 45 tablet 0    TURMERIC, BULK, MISC 450 mg by Misc.(Non-Drug; Combo Route) route once daily.       No current facility-administered medications on file prior to visit.        Allergies:  Patient has no known allergies.    Immunizations:  Immunization History   Administered Date(s) Administered    Pneumococcal Conjugate - 13  "Valent 07/06/2017    Td - PF (ADULT) 09/07/2005    Tdap 06/28/2016       Review of Systems:  Review of Systems   Constitutional: Negative for activity change and unexpected weight change.   HENT: Negative for hearing loss, rhinorrhea and trouble swallowing.    Eyes: Negative for discharge and visual disturbance.   Respiratory: Negative for chest tightness and wheezing.    Cardiovascular: Negative for chest pain and palpitations.   Gastrointestinal: Negative for blood in stool, constipation, diarrhea and vomiting.   Endocrine: Negative for polydipsia and polyuria.   Genitourinary: Negative for difficulty urinating, dysuria, hematuria and menstrual problem.   Musculoskeletal: Positive for neck pain. Negative for arthralgias and joint swelling.   Neurological: Negative for weakness and headaches.   Psychiatric/Behavioral: Negative for confusion and dysphoric mood.       Objective:    Vitals:  Vitals:    07/02/18 1020   BP: 122/64   Pulse: 64   Temp: 98.6 °F (37 °C)   TempSrc: Oral   Weight: 56.1 kg (123 lb 10.9 oz)   Height: 5' 6" (1.676 m)   PainSc: 0-No pain       Physical Exam   Constitutional: She is oriented to person, place, and time. She appears well-developed and well-nourished. No distress.   HENT:   Mouth/Throat: Oropharynx is clear and moist.   Eyes: No scleral icterus.   Cardiovascular: Normal rate and regular rhythm.  Exam reveals no gallop and no friction rub.    No murmur heard.  Pulmonary/Chest: Effort normal and breath sounds normal. No respiratory distress. She has no wheezes. She has no rales.   Musculoskeletal: She exhibits no edema.   Neurological: She is alert and oriented to person, place, and time.   Skin: Skin is warm and dry.   extensive actinic damage   Psychiatric: She has a normal mood and affect. Her behavior is normal.   Vitals reviewed.      Data:  Previous labs reviewed and pertinent for CBC, CMP, lipids generally WNL in 12/17.        Jeana Carpenter MD  Internal Medicine    "

## 2018-10-08 ENCOUNTER — TELEPHONE (OUTPATIENT)
Dept: ENDOCRINOLOGY | Facility: CLINIC | Age: 68
End: 2018-10-08

## 2018-10-08 DIAGNOSIS — E04.1 THYROID NODULE: Primary | ICD-10-CM

## 2018-10-08 NOTE — TELEPHONE ENCOUNTER
----- Message from Cornelio White sent at 10/8/2018  2:51 PM CDT -----  Type:  Patient Returning Call    Who Called:  Self   Who Left Message for Patient:  Ochoa Does the patient know what this is regarding?: NA  Best Call Back Number:  [phone]  Additional Information:

## 2018-10-08 NOTE — TELEPHONE ENCOUNTER
----- Message from Jose Presley sent at 10/8/2018  2:16 PM CDT -----  Type:  Sooner Apoointment Request    Caller is requesting a sooner appointment.  Caller declined first available appointment listed below.  Caller will not accept being placed on the waitlist and is requesting a message be sent to doctor.    Name of Caller:  Patient  When is the first available appointment?  Out of template   Symptoms:  Annual/Thyroid  Best Call Back Number:  [phone]

## 2018-10-10 ENCOUNTER — PATIENT MESSAGE (OUTPATIENT)
Dept: ENDOCRINOLOGY | Facility: CLINIC | Age: 68
End: 2018-10-10

## 2018-10-10 DIAGNOSIS — E04.2 MULTINODULAR GOITER: Primary | ICD-10-CM

## 2018-10-11 NOTE — TELEPHONE ENCOUNTER
Pt with appt in January  Sent pic of growing lump. See message and pic  Need to come in sooner? Ultrasound? Please advise

## 2018-10-30 ENCOUNTER — PATIENT MESSAGE (OUTPATIENT)
Dept: ENDOCRINOLOGY | Facility: CLINIC | Age: 68
End: 2018-10-30

## 2018-11-05 ENCOUNTER — HOSPITAL ENCOUNTER (OUTPATIENT)
Dept: RADIOLOGY | Facility: HOSPITAL | Age: 68
Discharge: HOME | End: 2018-11-05
Attending: INTERNAL MEDICINE
Payer: MEDICARE

## 2018-11-05 DIAGNOSIS — E04.2 MULTINODULAR GOITER: ICD-10-CM

## 2018-11-05 PROCEDURE — 76536 US EXAM OF HEAD AND NECK: CPT | Mod: TC,PO

## 2018-11-05 PROCEDURE — 76536 US EXAM OF HEAD AND NECK: CPT | Mod: 26,,, | Performed by: RADIOLOGY

## 2018-12-24 ENCOUNTER — PES CALL (OUTPATIENT)
Dept: ADMINISTRATIVE | Facility: CLINIC | Age: 68
End: 2018-12-24

## 2019-01-07 ENCOUNTER — LAB VISIT (OUTPATIENT)
Dept: LAB | Facility: HOSPITAL | Age: 69
End: 2019-01-07
Attending: INTERNAL MEDICINE
Payer: MEDICARE

## 2019-01-07 ENCOUNTER — PATIENT MESSAGE (OUTPATIENT)
Dept: ENDOCRINOLOGY | Facility: CLINIC | Age: 69
End: 2019-01-07

## 2019-01-07 DIAGNOSIS — E04.1 THYROID NODULE: ICD-10-CM

## 2019-01-07 LAB — TSH SERPL DL<=0.005 MIU/L-ACNC: 2.42 UIU/ML

## 2019-01-07 PROCEDURE — 36415 COLL VENOUS BLD VENIPUNCTURE: CPT | Mod: PN

## 2019-01-07 PROCEDURE — 84443 ASSAY THYROID STIM HORMONE: CPT

## 2019-01-14 ENCOUNTER — OFFICE VISIT (OUTPATIENT)
Dept: ENDOCRINOLOGY | Facility: CLINIC | Age: 69
End: 2019-01-14
Payer: MEDICARE

## 2019-01-14 VITALS
SYSTOLIC BLOOD PRESSURE: 150 MMHG | DIASTOLIC BLOOD PRESSURE: 74 MMHG | HEIGHT: 66 IN | WEIGHT: 126.81 LBS | HEART RATE: 69 BPM | BODY MASS INDEX: 20.38 KG/M2

## 2019-01-14 DIAGNOSIS — E04.1 THYROID NODULE: Primary | ICD-10-CM

## 2019-01-14 DIAGNOSIS — I10 BENIGN ESSENTIAL HTN: ICD-10-CM

## 2019-01-14 PROCEDURE — 99213 OFFICE O/P EST LOW 20 MIN: CPT | Mod: S$PBB,,, | Performed by: INTERNAL MEDICINE

## 2019-01-14 PROCEDURE — 99213 OFFICE O/P EST LOW 20 MIN: CPT | Mod: PBBFAC,PO | Performed by: INTERNAL MEDICINE

## 2019-01-14 PROCEDURE — 99999 PR PBB SHADOW E&M-EST. PATIENT-LVL III: ICD-10-PCS | Mod: PBBFAC,,, | Performed by: INTERNAL MEDICINE

## 2019-01-14 PROCEDURE — 99999 PR PBB SHADOW E&M-EST. PATIENT-LVL III: CPT | Mod: PBBFAC,,, | Performed by: INTERNAL MEDICINE

## 2019-01-14 PROCEDURE — 99213 PR OFFICE/OUTPT VISIT, EST, LEVL III, 20-29 MIN: ICD-10-PCS | Mod: S$PBB,,, | Performed by: INTERNAL MEDICINE

## 2019-01-14 NOTE — PROGRESS NOTES
CHIEF COMPLAINT: Multinodular Goiter  68 year old being seen as a f/u. Benign left FNA 1/7/2016. States had BP checked earlier today and systolic was 118/68. No difficulty swallowing. No palpitations. No tremors. No XRT to head/neck.         PAST MEDICAL HISTORY/PAST SURGICAL HISTORY:  Reviewed in [facility]    SOCIAL HISTORY: No T/A    FAMILY HISTORY:  Father was on T4 replacement. No thyroid cancer. + DM 2.     MEDICATIONS/ALLERGIES: The patient's MedCard has been updated and reviewed.      ROS:   Constitutional: weight decreased with diet as well as exercise.   Eyes: No recent visual changes  ENT: No dysphagia  Cardiovascular: Denies current anginal symptoms  Respiratory: Denies current respiratory difficulty  Gastrointestinal: Denies recent bowel disturbances  GenitoUrinary - No dysuria  Skin: No new skin rash  Neurologic: No focal neurologic complaints  Remainder ROS negative        PE:    GENERAL: Well developed, well nourished.  NECK: Supple, trachea midline, Left nodule palpabel  CHEST: Resp even and unlabored, CTA bilateral.  CARDIAC: RRR, S1, S2 heard, no murmurs, rubs, S3, or S4    Results for SHELTON SMITH (MRN [id number]) as of 1/14/2019 13:05   Ref. Range 1/7/2019 10:45   TSH Latest Ref Range: 0.400 - 4.000 uIU/mL 2.418     US SOFT TISSUE HEAD NECK THYROID    CLINICAL HISTORY:  Nontoxic multinodular goiter    TECHNIQUE:  Ultrasound of the thyroid and cervical lymph nodes was performed.    COMPARISON:  The thyroid ultrasound dated 08/18/2017 and 11/24/2015    FINDINGS:  The thyroid gland is normal in size but again the left lobe is 3-4 times larger than the right.  The right lobe measures 4.1 x 1.2 x 1.3 cm with an estimated volume of 3.3 mL.  The thyroid isthmus measures 2 mm in thickness.  The left lobe measures 4.6 x 2.0 x 2.4 cm with an estimated volume of 11.5 mL.  Total thyroid volume is 14.8 mL, no significant change compared to the prior study.    There is a small 2.5 mm cystic nodule in the midpole of  the right lobe.    Redemonstrated is a large complex solid and cystic dominant nodule occupying the mid and lower pole of the left lobe without significant change compared to the most recent prior study.  This large nodule measures 3.6 x 2.4 x 1.8 cm (previously measured 3.3 x 2.2 x 2.1 cm).  It should be noted that this nodule has undergone previous biopsy yielding benign result.    There is no pathologic lymphadenopathy.      Impression       1. There is no detrimental change in the appearance of the thyroid gland compared to the prior study.  Redemonstrated is a dominant complex solid and cystic nodule in the mid and lower pole of the left lobe without significant change in size or appearance.  This nodule does meet criteria for FNA or biopsy but has been previously biopsied yielding benign result.  There is no change.             ASSESSMENT/PLAN:  1. Thyroid Nodule- Benign Left FNA in 2016. US shows no change. At this point can f/u in 2 years and repeat US    2. HTN- States had it checked earlier today and was controlled.       FOLLOWUP  F/U 2 year TSH, Thyroid

## 2019-01-15 ENCOUNTER — PATIENT MESSAGE (OUTPATIENT)
Dept: FAMILY MEDICINE | Facility: CLINIC | Age: 69
End: 2019-01-15

## 2019-01-15 DIAGNOSIS — I10 ESSENTIAL HYPERTENSION: Primary | ICD-10-CM

## 2019-01-15 DIAGNOSIS — K21.00 GASTROESOPHAGEAL REFLUX DISEASE WITH ESOPHAGITIS: ICD-10-CM

## 2019-01-28 ENCOUNTER — LAB VISIT (OUTPATIENT)
Dept: LAB | Facility: HOSPITAL | Age: 69
End: 2019-01-28
Attending: INTERNAL MEDICINE
Payer: MEDICARE

## 2019-01-28 DIAGNOSIS — I10 ESSENTIAL HYPERTENSION: ICD-10-CM

## 2019-01-28 DIAGNOSIS — K21.00 GASTROESOPHAGEAL REFLUX DISEASE WITH ESOPHAGITIS: ICD-10-CM

## 2019-01-28 LAB
ALBUMIN SERPL BCP-MCNC: 3.8 G/DL
ALP SERPL-CCNC: 43 U/L
ALT SERPL W/O P-5'-P-CCNC: 12 U/L
ANION GAP SERPL CALC-SCNC: 7 MMOL/L
AST SERPL-CCNC: 20 U/L
BASOPHILS # BLD AUTO: 0.03 K/UL
BASOPHILS NFR BLD: 0.6 %
BILIRUB SERPL-MCNC: 0.6 MG/DL
BUN SERPL-MCNC: 10 MG/DL
CALCIUM SERPL-MCNC: 10.1 MG/DL
CHLORIDE SERPL-SCNC: 104 MMOL/L
CHOLEST SERPL-MCNC: 203 MG/DL
CHOLEST/HDLC SERPL: 2.4 {RATIO}
CO2 SERPL-SCNC: 29 MMOL/L
CREAT SERPL-MCNC: 0.8 MG/DL
DIFFERENTIAL METHOD: ABNORMAL
EOSINOPHIL # BLD AUTO: 0.2 K/UL
EOSINOPHIL NFR BLD: 3 %
ERYTHROCYTE [DISTWIDTH] IN BLOOD BY AUTOMATED COUNT: 14.7 %
EST. GFR  (AFRICAN AMERICAN): >60 ML/MIN/1.73 M^2
EST. GFR  (NON AFRICAN AMERICAN): >60 ML/MIN/1.73 M^2
GLUCOSE SERPL-MCNC: 85 MG/DL
HCT VFR BLD AUTO: 40.6 %
HDLC SERPL-MCNC: 85 MG/DL
HDLC SERPL: 41.9 %
HGB BLD-MCNC: 12.4 G/DL
IMM GRANULOCYTES # BLD AUTO: 0.01 K/UL
IMM GRANULOCYTES NFR BLD AUTO: 0.2 %
LDLC SERPL CALC-MCNC: 110.8 MG/DL
LYMPHOCYTES # BLD AUTO: 2.2 K/UL
LYMPHOCYTES NFR BLD: 44.8 %
MCH RBC QN AUTO: 27.8 PG
MCHC RBC AUTO-ENTMCNC: 30.5 G/DL
MCV RBC AUTO: 91 FL
MONOCYTES # BLD AUTO: 0.5 K/UL
MONOCYTES NFR BLD: 9.4 %
NEUTROPHILS # BLD AUTO: 2.1 K/UL
NEUTROPHILS NFR BLD: 42 %
NONHDLC SERPL-MCNC: 118 MG/DL
NRBC BLD-RTO: 0 /100 WBC
PLATELET # BLD AUTO: 247 K/UL
PMV BLD AUTO: 9 FL
POTASSIUM SERPL-SCNC: 4.3 MMOL/L
PROT SERPL-MCNC: 7.4 G/DL
RBC # BLD AUTO: 4.46 M/UL
SODIUM SERPL-SCNC: 140 MMOL/L
TRIGL SERPL-MCNC: 36 MG/DL
WBC # BLD AUTO: 5 K/UL

## 2019-01-28 PROCEDURE — 36415 COLL VENOUS BLD VENIPUNCTURE: CPT | Mod: PN

## 2019-01-28 PROCEDURE — 80053 COMPREHEN METABOLIC PANEL: CPT

## 2019-01-28 PROCEDURE — 85025 COMPLETE CBC W/AUTO DIFF WBC: CPT

## 2019-01-28 PROCEDURE — 80061 LIPID PANEL: CPT

## 2019-01-31 ENCOUNTER — OFFICE VISIT (OUTPATIENT)
Dept: FAMILY MEDICINE | Facility: CLINIC | Age: 69
End: 2019-01-31
Payer: MEDICARE

## 2019-01-31 VITALS
HEIGHT: 66 IN | WEIGHT: 125.44 LBS | BODY MASS INDEX: 20.16 KG/M2 | SYSTOLIC BLOOD PRESSURE: 116 MMHG | DIASTOLIC BLOOD PRESSURE: 76 MMHG | HEART RATE: 64 BPM

## 2019-01-31 DIAGNOSIS — I70.0 AORTIC ATHEROSCLEROSIS: Primary | ICD-10-CM

## 2019-01-31 DIAGNOSIS — L57.0 ACTINIC KERATOSES: ICD-10-CM

## 2019-01-31 DIAGNOSIS — I10 ESSENTIAL HYPERTENSION: ICD-10-CM

## 2019-01-31 DIAGNOSIS — G47.33 OSA ON CPAP: ICD-10-CM

## 2019-01-31 DIAGNOSIS — K21.00 GASTROESOPHAGEAL REFLUX DISEASE WITH ESOPHAGITIS: ICD-10-CM

## 2019-01-31 DIAGNOSIS — M85.80 OSTEOPENIA, UNSPECIFIED LOCATION: ICD-10-CM

## 2019-01-31 PROCEDURE — 99213 OFFICE O/P EST LOW 20 MIN: CPT | Mod: PBBFAC,PN | Performed by: INTERNAL MEDICINE

## 2019-01-31 PROCEDURE — 99999 PR PBB SHADOW E&M-EST. PATIENT-LVL III: CPT | Mod: PBBFAC,,, | Performed by: INTERNAL MEDICINE

## 2019-01-31 PROCEDURE — 99999 PR PBB SHADOW E&M-EST. PATIENT-LVL III: ICD-10-PCS | Mod: PBBFAC,,, | Performed by: INTERNAL MEDICINE

## 2019-01-31 PROCEDURE — 99214 OFFICE O/P EST MOD 30 MIN: CPT | Mod: S$PBB,,, | Performed by: INTERNAL MEDICINE

## 2019-01-31 PROCEDURE — 99214 PR OFFICE/OUTPT VISIT, EST, LEVL IV, 30-39 MIN: ICD-10-PCS | Mod: S$PBB,,, | Performed by: INTERNAL MEDICINE

## 2019-01-31 NOTE — PROGRESS NOTES
Assessment and Plan:    1. Essential hypertension  Uncontrolled today but persistently controlled on home readings. Will have patient send me monthly BP readings and evaluate control based on this. She did not have BP log with her today.     2. Gastroesophageal reflux disease with esophagitis  No symptoms since stopping PPI.     3. Osteopenia, unspecified location  Noted in 2017, low risk for fracture.    4. ANG on CPAP  Using CPAP and doing well on this.     5. Aortic atherosclerosis  Not on statin due to low CV risk overall. Noted on previous imaging    6. Actinic keratoses  H/o reported AKs treated with cryotherapy and significant actinic skin damage. Patient would like to establish with Dermatologist for periodic skin evaluations.   - Ambulatory referral to Dermatology    ______________________________________________________________________  Subjective:    Chief Complaint:  follow up chronic medical conditions      HPI:  Mindi is a 68 y.o. year old woman here to follow up chronic medical conditions.     Since she was last here she has done a lot of hiking, went in [place].     HTN- Taking losartan 25. She does check it at home but forgot the log today. Reports that this is very well controlled, 110s/70s at home. Always higher in the office.     GERD- Had been taking PPI QOD, discussed stopping this last visit to see if things improve. She stopped this and has not had any symptoms at all since she was last here.     Non-toxic MNG- Sees Dr. Walsh, seen earlier this month. No detrimental change in last imaging. Recommended follow up with imaging in 2 years.     ANG- Sees Dr. Baron, seen 2-3 months ago. Still using CPAP and finds this to be very helpful.     Medications:  Current Outpatient Medications on File Prior to Visit   Medication Sig Dispense Refill    b complex vitamins tablet Take 1 tablet by mouth as needed.       CALCIUM CARBONATE/VITAMIN D3 (VITAMIN D-3 ORAL) Take 1 tablet by mouth once daily.        "losartan (COZAAR) 25 MG tablet Take 1 tablet (25 mg total) by mouth once daily. 90 tablet 3    magnesium 30 mg Tab Take 500 mg by mouth once.      multivitamin (ONE DAILY MULTIVITAMIN) per tablet Take 1 tablet by mouth as needed.       ondansetron (ZOFRAN-ODT) 8 MG TbDL Take 1 tablet (8 mg total) by mouth every 8 (eight) hours as needed. 12 tablet 0    TURMERIC, BULK, MISC 450 mg by Misc.(Non-Drug; Combo Route) route once daily.       No current facility-administered medications on file prior to visit.        Review of Systems:  Review of Systems   Constitutional: Negative for activity change and unexpected weight change.   HENT: Negative for hearing loss, rhinorrhea and trouble swallowing.    Eyes: Negative for discharge and visual disturbance.   Respiratory: Negative for chest tightness and wheezing.    Cardiovascular: Negative for chest pain and palpitations.   Gastrointestinal: Negative for blood in stool, constipation, diarrhea and vomiting.   Endocrine: Negative for polydipsia and polyuria.   Genitourinary: Negative for difficulty urinating, dysuria, hematuria and menstrual problem.   Musculoskeletal: Negative for arthralgias, joint swelling and neck pain.   Neurological: Negative for weakness and headaches.   Psychiatric/Behavioral: Negative for confusion and dysphoric mood.     Entered by patient and reviewed and updated during visit      Past Medical History:  Past Medical History:   Diagnosis Date    GERD (gastroesophageal reflux disease)     Hemorrhoid     Hypertension     Mild vitamin D deficiency     Osteopenia        Objective:    Vitals:  Vitals:    01/31/19 1022 01/31/19 1149   BP: (!) 142/80 116/76   Pulse: 64    Weight: 56.9 kg (125 lb 7.1 oz)    Height: 5' 6" (1.676 m)    PainSc: 0-No pain        Physical Exam   Constitutional: She is oriented to person, place, and time. She appears well-developed and well-nourished. No distress.   HENT:   Mouth/Throat: Oropharynx is clear and moist. "   Eyes: Conjunctivae are normal. Right eye exhibits no discharge. Left eye exhibits no discharge.   Cardiovascular: Normal rate and regular rhythm.   Pulmonary/Chest: Effort normal. No respiratory distress.   Neurological: She is alert and oriented to person, place, and time.   Skin: Skin is warm and dry.   Psychiatric: She has a normal mood and affect. Her behavior is normal. Judgment and thought content normal.   Vitals reviewed.      Data:  Previous labs reviewed and pertinent for elevated Tchol with high HDL and LDL WNL.      Jeana Carpenter MD  Internal Medicine

## 2019-02-21 ENCOUNTER — INITIAL CONSULT (OUTPATIENT)
Dept: DERMATOLOGY | Facility: CLINIC | Age: 69
End: 2019-02-21
Payer: MEDICARE

## 2019-02-21 DIAGNOSIS — D48.5 NEOPLASM OF UNCERTAIN BEHAVIOR OF SKIN: ICD-10-CM

## 2019-02-21 DIAGNOSIS — L81.3 CAFE AU LAIT SPOTS: ICD-10-CM

## 2019-02-21 DIAGNOSIS — D22.9 MULTIPLE BENIGN NEVI: ICD-10-CM

## 2019-02-21 DIAGNOSIS — L81.4 LENTIGINES: Primary | ICD-10-CM

## 2019-02-21 DIAGNOSIS — D18.00 ANGIOMA: ICD-10-CM

## 2019-02-21 PROCEDURE — 99203 PR OFFICE/OUTPT VISIT, NEW, LEVL III, 30-44 MIN: ICD-10-PCS | Mod: S$PBB,,, | Performed by: DERMATOLOGY

## 2019-02-21 PROCEDURE — 99212 OFFICE O/P EST SF 10 MIN: CPT | Mod: PBBFAC,PO | Performed by: DERMATOLOGY

## 2019-02-21 PROCEDURE — 99999 PR PBB SHADOW E&M-EST. PATIENT-LVL II: ICD-10-PCS | Mod: PBBFAC,,, | Performed by: DERMATOLOGY

## 2019-02-21 PROCEDURE — 99203 OFFICE O/P NEW LOW 30 MIN: CPT | Mod: S$PBB,,, | Performed by: DERMATOLOGY

## 2019-02-21 PROCEDURE — 99999 PR PBB SHADOW E&M-EST. PATIENT-LVL II: CPT | Mod: PBBFAC,,, | Performed by: DERMATOLOGY

## 2019-02-21 NOTE — LETTER
February 24, 2019      Jeana Carpenter MD  [address] [place] [address]           [place] [facility] [address] [facility] [address] [place] [address]  Phone: [phone]  Fax: [phone]          Patient: Mindi Agudelo   MR Number: [id number]   YOB: 1950   Date of Visit: 2/21/2019       Dear Dr. Jeana Carpenter:    Thank you for referring Mindi Agudelo to me for evaluation. Attached you will find relevant portions of my assessment and plan of care.    If you have questions, please do not hesitate to call me. I look forward to following Mindi Agudelo along with you.    Sincerely,    Sangita Reyes MD    Enclosure  CC:  No Recipients    If you would like to receive this communication electronically, please contact [email or web address] or [phone] to request more information on [employer] access.    For providers and/or their staff who would like to refer a patient to [facility], please contact us through our one-stop-shop provider referral line, [facility], at [phone].    If you feel you have received this communication in error or would no longer like to receive these types of communications, please e-mail [email or web address]

## 2019-02-21 NOTE — PROGRESS NOTES
Subjective:       Patient ID:  Mindi Agudelo is a 68 y.o. female who presents for   Chief Complaint   Patient presents with    Skin Check     67 y/o F presents for initial visit for skin check. Her PCP recommended that she see a dermatologist. Pt noticed a red spot on her nose but thinks it is where her c pap machine rubs on her nose.  She has not noticed bleeding.  No prior treatments.    No h/o skin cancer. Denies family h/o melanoma; extensive sun exposure         Past Medical History:   Diagnosis Date    GERD (gastroesophageal reflux disease)     Hemorrhoid     Hypertension     Mild vitamin D deficiency     Osteopenia      Review of Systems   Constitutional: Negative for fever, chills and fatigue.   Skin: Positive for itching, daily sunscreen use, activity-related sunscreen use and wears hat. Negative for rash and dry skin.        Objective:    Physical Exam   Constitutional: She appears well-developed and well-nourished.   HENT:   Mouth/Throat: Lips normal.    Eyes: Lids are normal.    Neurological: She is alert and oriented to person, place, and time.   Psychiatric: She has a normal mood and affect.   Skin:   Areas Examined (abnormalities noted in diagram):   Scalp / Hair Palpated and Inspected  Head / Face Inspection Performed  Neck Inspection Performed  Chest / Axilla Inspection Performed  Abdomen Inspection Performed  Genitals / Buttocks / Groin Inspection Performed  Back Inspection Performed  RUE Inspected  LUE Inspection Performed  RLE Inspected  LLE Inspection Performed  Nails and Digits Inspection Performed                   Diagram Legend     Erythematous scaling macule/papule c/w actinic keratosis       Vascular papule c/w angioma      Pigmented verrucoid papule/plaque c/w seborrheic keratosis      Yellow umbilicated papule c/w sebaceous hyperplasia      Irregularly shaped tan macule c/w lentigo     1-2 mm smooth white papules consistent with Milia      Movable subcutaneous cyst with punctum  c/w epidermal inclusion cyst      Subcutaneous movable cyst c/w pilar cyst      Firm pink to brown papule c/w dermatofibroma      Pedunculated fleshy papule(s) c/w skin tag(s)      Evenly pigmented macule c/w junctional nevus     Mildly variegated pigmented, slightly irregular-bordered macule c/w mildly atypical nevus      Flesh colored to evenly pigmented papule c/w intradermal nevus       Pink pearly papule/plaque c/w basal cell carcinoma      Erythematous hyperkeratotic cursted plaque c/w SCC      Surgical scar with no sign of skin cancer recurrence      Open and closed comedones      Inflammatory papules and pustules      Verrucoid papule consistent consistent with wart     Erythematous eczematous patches and plaques     Dystrophic onycholytic nail with subungual debris c/w onychomycosis     Umbilicated papule    Erythematous-base heme-crusted tan verrucoid plaque consistent with inflamed seborrheic keratosis     Erythematous Silvery Scaling Plaque c/w Psoriasis     See annotation      Assessment / Plan:        Lentigines  These are benign hyperpigmented sun induced lesions. Daily sun protection will reduce the number of new lesions    Multiple benign nevi  Reassurance that her nevi appear benign with regular and consistent pigment pattern on dermoscopy  Encouraged sun protection while outdoors    Angioma  This is a benign vascular lesion. Reassurance given. No treatment required.     Neoplasm of uncertain behavior of skin-macular erythema only  Likely irritation from CPAP machine.  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

## 2019-04-05 ENCOUNTER — OFFICE VISIT (OUTPATIENT)
Dept: OPTOMETRY | Facility: CLINIC | Age: 69
End: 2019-04-05
Payer: MEDICARE

## 2019-04-05 DIAGNOSIS — H52.203 HYPEROPIA WITH ASTIGMATISM AND PRESBYOPIA, BILATERAL: ICD-10-CM

## 2019-04-05 DIAGNOSIS — H43.813 POSTERIOR VITREOUS DETACHMENT OF BOTH EYES: Primary | ICD-10-CM

## 2019-04-05 DIAGNOSIS — H52.4 HYPEROPIA WITH ASTIGMATISM AND PRESBYOPIA, BILATERAL: ICD-10-CM

## 2019-04-05 DIAGNOSIS — H52.03 HYPEROPIA WITH ASTIGMATISM AND PRESBYOPIA, BILATERAL: ICD-10-CM

## 2019-04-05 DIAGNOSIS — H25.13 NUCLEAR SCLEROSIS OF BOTH EYES: ICD-10-CM

## 2019-04-05 DIAGNOSIS — Z13.5 GLAUCOMA SCREENING: ICD-10-CM

## 2019-04-05 PROCEDURE — 99213 OFFICE O/P EST LOW 20 MIN: CPT | Mod: PBBFAC,PO | Performed by: OPTOMETRIST

## 2019-04-05 PROCEDURE — 92015 PR REFRACTION: ICD-10-PCS | Mod: ,,, | Performed by: OPTOMETRIST

## 2019-04-05 PROCEDURE — 99999 PR PBB SHADOW E&M-EST. PATIENT-LVL III: CPT | Mod: PBBFAC,,, | Performed by: OPTOMETRIST

## 2019-04-05 PROCEDURE — 92015 DETERMINE REFRACTIVE STATE: CPT | Mod: ,,, | Performed by: OPTOMETRIST

## 2019-04-05 PROCEDURE — 92014 PR EYE EXAM, EST PATIENT,COMPREHESV: ICD-10-PCS | Mod: S$PBB,,, | Performed by: OPTOMETRIST

## 2019-04-05 PROCEDURE — 99999 PR PBB SHADOW E&M-EST. PATIENT-LVL III: ICD-10-PCS | Mod: PBBFAC,,, | Performed by: OPTOMETRIST

## 2019-04-05 PROCEDURE — 92014 COMPRE OPH EXAM EST PT 1/>: CPT | Mod: S$PBB,,, | Performed by: OPTOMETRIST

## 2019-04-05 NOTE — PROGRESS NOTES
HPI     Annual Exam      Additional comments: DLE 12-17 (libby)   ocular health exam               Blurred Vision      Additional comments: at near only -- distance VA good              Spots and/or Floaters      Additional comments: OU -- no light flashes          Last edited by Laquita Lund on 4/5/2019 10:42 AM. (History)        ROS     Positive for: Eyes    Negative for: Constitutional, Gastrointestinal, Neurological, Skin,   Genitourinary, Musculoskeletal, HENT, Endocrine, Cardiovascular,   Respiratory, Psychiatric, Allergic/Imm, Heme/Lymph    Last edited by JONN Chong, OD on 4/5/2019 10:48 AM. (History)        Assessment /Plan     For exam results, see Encounter Report.    Posterior vitreous detachment of both eyes    Nuclear sclerosis of both eyes    Glaucoma screening    Hyperopia with astigmatism and presbyopia, bilateral      1. RD precautions given, reviewed  2. Early vis sig, not ready for consult  3. Not suspect  4. Updated specs rx---ok continue with otc only for near    Discussed and educated patient on current findings /plan.  RTC 1 year, prn if any changes / issues

## 2019-05-02 DIAGNOSIS — I10 ESSENTIAL HYPERTENSION: ICD-10-CM

## 2019-05-02 NOTE — TELEPHONE ENCOUNTER
Tried to reach pt. No answer, will try again later and I left a message on answering machine.    Contacting to inform pt of need for follow up appt w/ Jeana Carpenter MD.

## 2019-05-02 NOTE — TELEPHONE ENCOUNTER
BP was uncontrolled in January and she was supposed to send me readings in a month. She did not contact us. Please get her scheduled for BP follow up.

## 2019-05-06 NOTE — TELEPHONE ENCOUNTER
"Called and spoke with pt in regards to scheduling an appt. Pt was notified that she needed to be scheduled for a nurse visit to see how her BP is currently. Pt stated "I also told Dr. Carpenter I was keeping a record of my blood pressure. Should I still email that to her?" I stated "You can email your log, but we still also want you to be seen." Pt stated "I am currently in the dentist's office." I will give you a call back to schedule with her."   "

## 2019-05-07 ENCOUNTER — PATIENT MESSAGE (OUTPATIENT)
Dept: FAMILY MEDICINE | Facility: CLINIC | Age: 69
End: 2019-05-07

## 2019-05-07 RX ORDER — LOSARTAN POTASSIUM 25 MG/1
TABLET ORAL
Qty: 90 TABLET | Refills: 0 | Status: SHIPPED | OUTPATIENT
Start: 2019-05-07 | End: 2019-07-29 | Stop reason: SDUPTHER

## 2019-05-08 NOTE — TELEPHONE ENCOUNTER
"Called pt in regards to rx refill. No answer. Left voicemail stating "Your medications were refilled and sent over to your preferred pharmacy. If you have any questions or concerns please call us at [phone]."  "

## 2019-07-28 ENCOUNTER — PATIENT MESSAGE (OUTPATIENT)
Dept: FAMILY MEDICINE | Facility: CLINIC | Age: 69
End: 2019-07-28

## 2019-07-29 ENCOUNTER — OFFICE VISIT (OUTPATIENT)
Dept: FAMILY MEDICINE | Facility: CLINIC | Age: 69
End: 2019-07-29
Payer: MEDICARE

## 2019-07-29 ENCOUNTER — LAB VISIT (OUTPATIENT)
Dept: LAB | Facility: HOSPITAL | Age: 69
End: 2019-07-29
Attending: INTERNAL MEDICINE
Payer: MEDICARE

## 2019-07-29 VITALS
DIASTOLIC BLOOD PRESSURE: 60 MMHG | HEART RATE: 60 BPM | SYSTOLIC BLOOD PRESSURE: 110 MMHG | RESPIRATION RATE: 18 BRPM | HEIGHT: 66 IN | WEIGHT: 121.5 LBS | BODY MASS INDEX: 19.53 KG/M2

## 2019-07-29 DIAGNOSIS — I10 ESSENTIAL HYPERTENSION: Primary | ICD-10-CM

## 2019-07-29 DIAGNOSIS — I10 ESSENTIAL HYPERTENSION: ICD-10-CM

## 2019-07-29 DIAGNOSIS — Z12.39 SCREENING FOR MALIGNANT NEOPLASM OF BREAST: ICD-10-CM

## 2019-07-29 DIAGNOSIS — E55.9 MILD VITAMIN D DEFICIENCY: ICD-10-CM

## 2019-07-29 DIAGNOSIS — I70.0 AORTIC ATHEROSCLEROSIS: ICD-10-CM

## 2019-07-29 DIAGNOSIS — G47.33 OSA ON CPAP: ICD-10-CM

## 2019-07-29 LAB
ANION GAP SERPL CALC-SCNC: 10 MMOL/L (ref 8–16)
BUN SERPL-MCNC: 19 MG/DL (ref 8–23)
CALCIUM SERPL-MCNC: 9.9 MG/DL (ref 8.7–10.5)
CHLORIDE SERPL-SCNC: 102 MMOL/L (ref 95–110)
CO2 SERPL-SCNC: 30 MMOL/L (ref 23–29)
CREAT SERPL-MCNC: 0.9 MG/DL (ref 0.5–1.4)
EST. GFR  (AFRICAN AMERICAN): >60 ML/MIN/1.73 M^2
EST. GFR  (NON AFRICAN AMERICAN): >60 ML/MIN/1.73 M^2
GLUCOSE SERPL-MCNC: 86 MG/DL (ref 70–110)
POTASSIUM SERPL-SCNC: 3.9 MMOL/L (ref 3.5–5.1)
SODIUM SERPL-SCNC: 142 MMOL/L (ref 136–145)

## 2019-07-29 PROCEDURE — 80048 BASIC METABOLIC PNL TOTAL CA: CPT

## 2019-07-29 PROCEDURE — 99213 OFFICE O/P EST LOW 20 MIN: CPT | Mod: PBBFAC,PN | Performed by: INTERNAL MEDICINE

## 2019-07-29 PROCEDURE — 99999 PR PBB SHADOW E&M-EST. PATIENT-LVL III: CPT | Mod: PBBFAC,,, | Performed by: INTERNAL MEDICINE

## 2019-07-29 PROCEDURE — 99214 PR OFFICE/OUTPT VISIT, EST, LEVL IV, 30-39 MIN: ICD-10-PCS | Mod: S$PBB,,, | Performed by: INTERNAL MEDICINE

## 2019-07-29 PROCEDURE — 36415 COLL VENOUS BLD VENIPUNCTURE: CPT | Mod: PN

## 2019-07-29 PROCEDURE — 99214 OFFICE O/P EST MOD 30 MIN: CPT | Mod: S$PBB,,, | Performed by: INTERNAL MEDICINE

## 2019-07-29 PROCEDURE — 99999 PR PBB SHADOW E&M-EST. PATIENT-LVL III: ICD-10-PCS | Mod: PBBFAC,,, | Performed by: INTERNAL MEDICINE

## 2019-07-29 RX ORDER — LOSARTAN POTASSIUM 25 MG/1
25 TABLET ORAL DAILY
Qty: 90 TABLET | Refills: 2 | Status: SHIPPED | OUTPATIENT
Start: 2019-07-29 | End: 2020-05-01

## 2019-07-29 NOTE — PROGRESS NOTES
Assessment and Plan:    1. Essential hypertension  Controlled on home readings, has been monitoring essentially daily for the last 6 months and no values higher than goal. Discussed having her bring in her machine to correlate, but if hers is working would not change medications. Notably, she did bike here, which could explain higher value today.  - Basic metabolic panel; Future  - Comprehensive metabolic panel; Future  - Lipid panel; Future  - losartan (COZAAR) 25 MG tablet; Take 1 tablet (25 mg total) by mouth once daily.  Dispense: 90 tablet; Refill: 2    2. Mild vitamin D deficiency  - Vitamin D; Future    3. Aortic atherosclerosis  The 10-year ASCVD risk score (Viktor BENAVIDEZ Jr., et al., 2013) is: 6%    Values used to calculate the score:      Age: 68 years      Sex: Female      Is Non- : No      Diabetic: No      Tobacco smoker: No      Systolic Blood Pressure: 110 mmHg      Is BP treated: Yes      HDL Cholesterol: 85 mg/dL      Total Cholesterol: 203 mg/dL  Has imaging evidence of atherosclerosis and borderline ASCVD risk. We discussed possible benefit of statins as well as possible side effects, and patient declines at this time. I think this is reasonable as she has no clinical ASCVD.  - Lipid panel; Future    4. ANG on CPAP  Continue CPAP    5. Screening for malignant neoplasm of breast  - Mammo Digital Screening Bilat; Future      Rx given for:  Pneumovax  Shingrix  ___________________________________________________________________  Subjective:    Chief Complaint:  Follow up chronic medical conditions.     HPI:  Mindi is a 68 y.o. year old woman here for follow up chronic medical conditions.     HTN- Last visit and today has been higher than ideal while here. She brings home log today which shows that she is typically 100s-110s/50s-60s.    ANG- Using CPAP, no problems with this.     She is planning another hiking trip to [place]. Still very active. Biked here today.     Aortic  atherosclerosis- Never been on a statin.      Medications:  Current Outpatient Medications on File Prior to Visit   Medication Sig Dispense Refill    b complex vitamins tablet Take 1 tablet by mouth as needed.       CALCIUM CARBONATE/VITAMIN D3 (VITAMIN D-3 ORAL) Take 1 tablet by mouth once daily.       magnesium 30 mg Tab Take 500 mg by mouth once.      multivitamin (ONE DAILY MULTIVITAMIN) per tablet Take 1 tablet by mouth as needed.       ondansetron (ZOFRAN-ODT) 8 MG TbDL Take 1 tablet (8 mg total) by mouth every 8 (eight) hours as needed. 12 tablet 0    TURMERIC, BULK, MISC 450 mg by Misc.(Non-Drug; Combo Route) route once daily.      [DISCONTINUED] losartan (COZAAR) 25 MG tablet TAKE 1 TABLET(25 MG) BY MOUTH EVERY DAY 90 tablet 0     No current facility-administered medications on file prior to visit.        Review of Systems:  Review of Systems   Constitutional: Negative for activity change and unexpected weight change.   HENT: Negative for hearing loss, rhinorrhea and trouble swallowing.    Eyes: Negative for discharge and visual disturbance.   Respiratory: Negative for chest tightness and wheezing.    Cardiovascular: Negative for chest pain and palpitations.   Gastrointestinal: Negative for blood in stool, constipation, diarrhea and vomiting.   Endocrine: Negative for polydipsia and polyuria.   Genitourinary: Negative for difficulty urinating, dysuria, hematuria and menstrual problem.   Musculoskeletal: Negative for arthralgias, joint swelling and neck pain.   Neurological: Negative for weakness and headaches.   Psychiatric/Behavioral: Negative for confusion and dysphoric mood.     Entered by patient and reviewed and updated during visit      Past Medical History:  Past Medical History:   Diagnosis Date    Cataract     OU    GERD (gastroesophageal reflux disease)     Hemorrhoid     Hypertension     Mild vitamin D deficiency     Osteopenia        Objective:    Vitals:  Vitals:    07/29/19 1023  "07/29/19 1034   BP: (!) 140/76 110/60   Pulse: 60    Resp: 18    Weight: 55.1 kg (121 lb 7.6 oz)    Height: 5' 6" (1.676 m)    PainSc: 0-No pain        Physical Exam   Constitutional: She is oriented to person, place, and time. She appears well-developed and well-nourished. No distress.   HENT:   Mouth/Throat: Oropharynx is clear and moist.   Eyes: No scleral icterus.   Cardiovascular: Normal rate and regular rhythm.   No murmur heard.  Pulmonary/Chest: Effort normal and breath sounds normal. No respiratory distress. She has no wheezes.   Musculoskeletal: She exhibits no edema.   Neurological: She is alert and oriented to person, place, and time.   Skin: Skin is warm and dry.   Psychiatric: She has a normal mood and affect. Her behavior is normal.   Vitals reviewed.      Data:  Previous labs reviewed and pertinent for .      Jeana Carpenter MD  Internal Medicine  " Yes

## 2019-07-30 ENCOUNTER — PATIENT MESSAGE (OUTPATIENT)
Dept: FAMILY MEDICINE | Facility: CLINIC | Age: 69
End: 2019-07-30

## 2019-07-30 ENCOUNTER — CLINICAL SUPPORT (OUTPATIENT)
Dept: FAMILY MEDICINE | Facility: CLINIC | Age: 69
End: 2019-07-30
Payer: MEDICARE

## 2019-07-30 ENCOUNTER — TELEPHONE (OUTPATIENT)
Dept: FAMILY MEDICINE | Facility: CLINIC | Age: 69
End: 2019-07-30

## 2019-07-30 VITALS — DIASTOLIC BLOOD PRESSURE: 68 MMHG | OXYGEN SATURATION: 98 % | SYSTOLIC BLOOD PRESSURE: 140 MMHG | HEART RATE: 68 BPM

## 2019-07-30 PROCEDURE — 99999 PR PBB SHADOW E&M-EST. PATIENT-LVL III: ICD-10-PCS | Mod: PBBFAC,,,

## 2019-07-30 PROCEDURE — 99999 PR PBB SHADOW E&M-EST. PATIENT-LVL III: CPT | Mod: PBBFAC,,,

## 2019-07-30 PROCEDURE — 99213 OFFICE O/P EST LOW 20 MIN: CPT | Mod: PBBFAC,PN

## 2019-07-30 NOTE — TELEPHONE ENCOUNTER
Pt did bring a wrist monitor to the nurse appt today that was accurate . Pt stated that she usually does not use that one because the other monitor would download readings . I advised her that she could use the wrist monitor as it works just the same .--lp

## 2019-07-30 NOTE — TELEPHONE ENCOUNTER
See nurse note . Pt here in clinic today for BP check. Home monitor not working. Pt unable to get connection / [employer] to work for home readings. Pt has decided that she wants to enroll in Digital HTN Program . Pls enroll pt . Thx .--lp

## 2019-07-30 NOTE — PROGRESS NOTES
Pt here for BP check to check accuracy of home monitior . Pt unable to get home monitor to work .     Mindi Agudelo 68 y.o. female is here today for Blood Pressure check.   History of HTN yes.    Review of patient's allergies indicates:  No Known Allergies  Creatinine   Date Value Ref Range Status   07/29/2019 0.9 0.5 - 1.4 mg/dL Final     Sodium   Date Value Ref Range Status   07/29/2019 142 136 - 145 mmol/L Final     Potassium   Date Value Ref Range Status   07/29/2019 3.9 3.5 - 5.1 mmol/L Final   ]  Patient verifies taking blood pressure medications on a regular basis at the same time of the day.     Current Outpatient Medications:     b complex vitamins tablet, Take 1 tablet by mouth as needed. , Disp: , Rfl:     CALCIUM CARBONATE/VITAMIN D3 (VITAMIN D-3 ORAL), Take 1 tablet by mouth once daily. , Disp: , Rfl:     losartan (COZAAR) 25 MG tablet, Take 1 tablet (25 mg total) by mouth once daily., Disp: 90 tablet, Rfl: 2    magnesium 30 mg Tab, Take 500 mg by mouth once., Disp: , Rfl:     multivitamin (ONE DAILY MULTIVITAMIN) per tablet, Take 1 tablet by mouth as needed. , Disp: , Rfl:     ondansetron (ZOFRAN-ODT) 8 MG TbDL, Take 1 tablet (8 mg total) by mouth every 8 (eight) hours as needed., Disp: 12 tablet, Rfl: 0    TURMERIC, BULK, MISC, 450 mg by Misc.(Non-Drug; Combo Route) route once daily., Disp: , Rfl:   Does patient have record of home blood pressure readings no.   Last dose of blood pressure medication was taken at 8:30 am today   Patient is asymptomatic.       ,   .    Blood pressure reading after 15 minutes was 142 72, Pulse 68    Dr. Carpenter  notified.

## 2019-08-14 ENCOUNTER — TELEPHONE (OUTPATIENT)
Dept: FAMILY MEDICINE | Facility: CLINIC | Age: 69
End: 2019-08-14

## 2019-08-20 ENCOUNTER — HOSPITAL ENCOUNTER (OUTPATIENT)
Dept: RADIOLOGY | Facility: HOSPITAL | Age: 69
Discharge: HOME | End: 2019-08-20
Attending: INTERNAL MEDICINE
Payer: MEDICARE

## 2019-08-20 DIAGNOSIS — Z12.39 SCREENING FOR MALIGNANT NEOPLASM OF BREAST: ICD-10-CM

## 2019-08-20 PROCEDURE — 77067 SCR MAMMO BI INCL CAD: CPT | Mod: TC,PO

## 2019-08-20 PROCEDURE — 77067 MAMMO DIGITAL SCREENING BILAT WITH TOMOSYNTHESIS_CAD: ICD-10-PCS | Mod: 26,,, | Performed by: RADIOLOGY

## 2019-08-20 PROCEDURE — 77063 MAMMO DIGITAL SCREENING BILAT WITH TOMOSYNTHESIS_CAD: ICD-10-PCS | Mod: 26,,, | Performed by: RADIOLOGY

## 2019-08-20 PROCEDURE — 77067 SCR MAMMO BI INCL CAD: CPT | Mod: 26,,, | Performed by: RADIOLOGY

## 2019-08-20 PROCEDURE — 77063 BREAST TOMOSYNTHESIS BI: CPT | Mod: 26,,, | Performed by: RADIOLOGY

## 2019-09-19 ENCOUNTER — PATIENT MESSAGE (OUTPATIENT)
Dept: ADMINISTRATIVE | Facility: OTHER | Age: 69
End: 2019-09-19

## 2019-09-25 ENCOUNTER — PATIENT OUTREACH (OUTPATIENT)
Dept: OTHER | Facility: OTHER | Age: 69
End: 2019-09-25

## 2019-09-25 NOTE — LETTER
October 14, 2019     Mindi Agudelo  [address]  [place] [address]       Dear Mindi,    Welcome to [facility]! Our goal is to make care effective, proactive and convenient by using data you send us from home to better treat your chronic conditions.          My name is Elissa Mancera, and I am your dedicated Digital Medicine clinician. As an expert in medication management, I will help ensure that the medications you are taking continue to provide the intended benefits and help you reach your goals. You can reach me directly at [phone] or by sending me a message directly through your [facility] account.      I am Padma Mauro and I will be your health . My job is to help you identify lifestyle changes to improve your disease control. We will talk about nutrition, exercise, and other ways you may be able to adjust your current habits to better your health. Additionally, we will help ensure you are completing the tests and screenings that are necessary to help manage your conditions. You can reach me directly at  or by sending me a message directly through your [facility] account.    Most importantly, YOU are at the center of this team. Together, we will work to improve your overall health and encourage you to meet your goals for a healthier lifestyle.     What we expect from YOU:  · Please take frequent home blood pressure measurements. We ask that you take at least 1 blood pressure reading per week, but more information will better help us get you know you. Be sure you rest for a few minutes before taking the reading in a quiet, comfortable place.     Be available to receive phone calls or [facility] messages, when appropriate, from your care team. Please let us know if there are any specific days or times that work best for us to reach you via phone.     Complete routine tests and screenings. Dont worry, we will help keep you on track!           What you should expect from your Digital  Medicine Care Team:   We will work with you to create a personalized plan of care and provide you with encouragement and education, including regarding lifestyle changes, that could help you manage your disease states.     We will adjust your current medications, if needed, and continue to monitor your long-term progress.     We will provide you and your physician with monthly progress reports after you have been in the program for more than 30 days.     We will send you reminders through [facility] and text messages to help ensure you do not miss any testing deadlines to help manage your disease states.    You will be able to reach us by phone or through your [facility] account by clicking our names under Care Team on the right side of the home screen.    I look forward to working with you to achieve your blood pressure goals!    We look forward to working with you to help manage your health,    Sincerely,    Your Digital Medicine Team    Please visit our websites to learn more:   · Hypertension: [email or web address]      Remember, we are not available for emergencies. If you have an emergency, please contact your doctors office directly or call [facility] on-call ([phone] or [phone]) or [phone].

## 2019-09-25 NOTE — LETTER
October 14, 2019     Mindi Agudelo  [address]  [place] [address]       Dear Mindi,    Welcome to [facility]! Our goal is to make care effective, proactive and convenient by using data you send us from home to better treat your chronic conditions.          My name is Elissa Mancera, and I am your dedicated Digital Medicine clinician. As an expert in medication management, I will help ensure that the medications you are taking continue to provide the intended benefits and help you reach your goals. You can reach me directly at [phone] or by sending me a message directly through your [facility] account.      I am Padma Mauro and I will be your health . My job is to help you identify lifestyle changes to improve your disease control. We will talk about nutrition, exercise, and other ways you may be able to adjust your current habits to better your health. Additionally, we will help ensure you are completing the tests and screenings that are necessary to help manage your conditions. You can reach me directly at  or by sending me a message directly through your [facility] account.    Most importantly, YOU are at the center of this team. Together, we will work to improve your overall health and encourage you to meet your goals for a healthier lifestyle.     What we expect from YOU:  · Please take frequent home blood pressure measurements. We ask that you take at least 1 blood pressure reading per week, but more information will better help us get you know you. Be sure you rest for a few minutes before taking the reading in a quiet, comfortable place.     Be available to receive phone calls or [facility] messages, when appropriate, from your care team. Please let us know if there are any specific days or times that work best for us to reach you via phone.     Complete routine tests and screenings. Dont worry, we will help keep you on track!           What you should expect from your Digital  Medicine Care Team:   We will work with you to create a personalized plan of care and provide you with encouragement and education, including regarding lifestyle changes, that could help you manage your disease states.     We will adjust your current medications, if needed, and continue to monitor your long-term progress.     We will provide you and your physician with monthly progress reports after you have been in the program for more than 30 days.     We will send you reminders through [facility] and text messages to help ensure you do not miss any testing deadlines to help manage your disease states.    You will be able to reach us by phone or through your [facility] account by clicking our names under Care Team on the right side of the home screen.    I look forward to working with you to achieve your blood pressure goals!    We look forward to working with you to help manage your health,    Sincerely,    Your Digital Medicine Team    Please visit our websites to learn more:   · Hypertension: [email or web address]      Remember, we are not available for emergencies. If you have an emergency, please contact your doctors office directly or call [facility] on-call ([phone] or [phone]) or 911.

## 2019-10-11 NOTE — PROGRESS NOTES
Digital Medicine Program Enrollment  HPI     2nd attempt for enrollment call.  No answer, left voicemail.  Sent my portal message.

## 2019-10-14 NOTE — PROGRESS NOTES
Digital Medicine Program Enrollment      Our goal is to get BP to consistently below 130/80mmHg and make the process convenient so patient can avoid extra trips to the office. Getting your blood pressure below 130/80mmHg (definition of control) will reduce your risk for heart attack, kidney failure, stroke and death (as well as kidney failure, eye disease, & dementia)      Reviewed that the Digital Medicine care team - consisting of a clinician and a health  - will follow the most current evidence-based national guidelines for treating your condition.  The health  will focus on lifestyle modifications and motivation while the clinician will focus on medication therapy.  The care team will review all data on a regular basis and reach out as needed.      Explained that one of the key parts of the program is communication with the care team.  Asked patient to respond to outreach attempts and complete questionnaires.  Stressed importance of medication adherence.      Explained that we expect patient to obtain several blood pressures per week at random times of day.  Instructed patient not to allow anyone else to use phone and monitoring device.  Confirmed appropriate BP monitoring technique.      Explained to patient that the digital medicine team is not available for emergencies.  Patient will call [facility] on-call ([phone] or [phone]) or [phone] if needed.

## 2019-10-15 ENCOUNTER — PATIENT OUTREACH (OUTPATIENT)
Dept: OTHER | Facility: OTHER | Age: 69
End: 2019-10-15

## 2019-10-22 NOTE — PROGRESS NOTES
"Digital Medicine: Health  Introduction    Introduced Mindi Agudelo to Digital Medicine. Discussed health  role and recommended lifestyle modifications.    Patient states that she believes she has  " White Coat Hypertension". She reports getting nervous when going to her doctor visits. Patient states that she gets a sense of anxiety and anxiousness.         HYPERTENSION  Our goal is to get BP to consistently below 130/80mmHg and make the process convenient so patient can avoid extra trips to the office. Getting your blood pressure below 130/80mmHg (definition of control) will reduce your risk for heart attack, kidney failure, stroke and death (as well as kidney failure, eye disease, & dementia)      Reviewed that the Digital Medicine care team - consisting of a clinician and a health  - will follow the most current evidence-based national guidelines for treating your condition.  The health  will focus on lifestyle modifications and motivation while the clinician will focus on medication therapy.  The care team will review all data on a regular basis and reach out as needed.      Explained that one of the key parts of the program is communication with the care team.  Asked patient to respond to outreach attempts and complete questionnaires.  Stressed importance of medication adherence.    Reviewed non-pharmacologic therapies and impact on BP.      Explained that we expect patient to obtain several blood pressures per week at random times of day.  Instructed patient not to allow anyone else to use phone and monitoring device.  Confirmed appropriate BP monitoring technique.      Explained to patient that the digital medicine team is not available for emergencies.  Patient will call [facility] on-call ([phone] or [phone]) or [phone] if needed.      Patient's BP goal is 130/80.Patient's BP average is 117/65 mmHg, which is above goal, per 2017 ACC/AHA Hypertension Guidelines.          Last 5 Patient " "Entered Readings                                      Current 30 Day Average: 117/65     Recent Readings 10/22/2019 10/21/2019 10/21/2019 10/19/2019 10/19/2019    SBP (mmHg) 117 109 119 116 109    DBP (mmHg) 66 60 66 63 62    Pulse 48 56 52 52 49                Diet:   Patient reports eating or drinking the following: water and fresh vegetablesShe has the following dietary restrictions: vegan    She eats 1 meals and 6 snacks per day.  She skips meals regularly.      The patient states that she typically eats a non-home cooked meal (ex: dining out, take out, frozen meals) 0 times per week.  She cooks for self.    Patient does the shopping for groceries.  She gets groceries from the grocery store.      She does not find cooking difficult.      Barriers to a Healthy Diet: no barriers to healthy eating    Patient states that she does a vegan diet. Patient tends to snack for most of her meals and eat when she gets hungry. The only solid "meal" she eats is dinner. Patient had a planet based protien bar for breakfast. Patient is strongly involved in her vegan diet and is open to vegan recipes that are low-sodium. Sent patient low-sodium vegan recipes via e-mail.      Intervention(s): reducing sodium intake, reducing seasonings and increasing water intake    Assigning the following patient goals: maintain low sodium diet    Physical Activity:   When asked if exercising, patient responded: yes    She exercises for 105 minutes per day 6 day(s) a week.  Her level of intensity when exercising is high.    Patient participates in the following activities: biking and walking    Patient exercises 6 days a week walking and riding her bike. Patient states that she walks 7 miles or rides her bike 10 miles. Both exercises last an hour to an hour and 45 minutes a day. She grew up active.    Intervention(s): goal tracking     Assigning the following patient goal(s): 150 minutes of exercise per week    Medication Adherence:       Patient " "is compliant with medications.    Tobacco and Alcohol:       Deferred     Patient is not eligible for referral to smoking cessation.      Patient has not drank alcohol in 9 years.       [facility]    INTERVENTION(S)  recommended diet modifications, recommend physical activity, reviewed monitoring technique, encouragement/support and goal setting    PLAN  patient verbalizes understanding, demonstrates understanding via teach back and patient amenable to changes    Patient is retired and is driven to focus and work on her lifestyle modifications. Patient is highly active and has a vegan diet. Patient states that she has "white coat hypertension." Patient requested vegan recipes via e-mail. Sent patient resources via e-mail. Encouraged patient to keep up the great work.       There are no preventive care reminders to display for this patient.    Reviewed the importance of self-monitoring, medication adherence, and that the health  can be used as a resource for lifestyle modifications to help reduce or maintain a healthy lifestyle.    Sent link to [facility] webpages and my contact information via [employer] for future questions. Follow up scheduled.       "

## 2019-10-23 ENCOUNTER — PATIENT MESSAGE (OUTPATIENT)
Dept: OTHER | Facility: OTHER | Age: 69
End: 2019-10-23

## 2019-11-26 ENCOUNTER — PATIENT OUTREACH (OUTPATIENT)
Dept: OTHER | Facility: OTHER | Age: 69
End: 2019-11-26

## 2019-11-26 NOTE — PROGRESS NOTES
Digital Medicine: Health  Follow-Up    Patient is 100% at goal. Patient has not changed anything in regards to lifestyle. Patient's goal is to decrease her LDL. She continues doing plant based diet and no dairy. She does not find this difficult. Patient recently gave up drinking coffee with her no dairy creamer to see how that would affect her blood pressure. Patient continues walking 7 miles a day for 6 days along with riding the bike for 10 miles. She has one day of rest. Encouraged patient to continue the great work and offered support.     The history is provided by the patient.     Follow Up  Follow-up reason(s): reading review      Routine Education Topics: eating patterns and physical activity        INTERVENTION(S)  encouragement/support    PLAN  patient verbalizes understanding and patient amenable to changes      There are no preventive care reminders to display for this patient.    Last 5 Patient Entered Readings                                      Current 30 Day Average: 112/62     Recent Readings 11/25/2019 11/24/2019 11/23/2019 11/22/2019 11/22/2019    SBP (mmHg) 109 106 105 115 116    DBP (mmHg) 62 57 62 61 64    Pulse 53 58 54 54 51                      Diet Screening   No change to diet.      Physical Activity Screening   No change to exercise routine.          SDOH

## 2019-12-11 ENCOUNTER — HOSPITAL ENCOUNTER (OUTPATIENT)
Dept: RADIOLOGY | Facility: HOSPITAL | Age: 69
Discharge: HOME | End: 2019-12-11
Attending: INTERNAL MEDICINE
Payer: MEDICARE

## 2019-12-11 DIAGNOSIS — E04.1 THYROID NODULE: ICD-10-CM

## 2019-12-11 PROCEDURE — 76536 US SOFT TISSUE HEAD NECK THYROID: ICD-10-PCS | Mod: 26,,, | Performed by: RADIOLOGY

## 2019-12-11 PROCEDURE — 76536 US EXAM OF HEAD AND NECK: CPT | Mod: TC,PO

## 2019-12-11 PROCEDURE — 76536 US EXAM OF HEAD AND NECK: CPT | Mod: 26,,, | Performed by: RADIOLOGY

## 2019-12-22 ENCOUNTER — TELEPHONE (OUTPATIENT)
Dept: ENDOCRINOLOGY | Facility: CLINIC | Age: 69
End: 2019-12-22

## 2019-12-23 NOTE — TELEPHONE ENCOUNTER
Pt states she'd rather discuss having an FNA w/ Dr. Nico monsalve.  Appt scheduled for 12/31/19 to discuss.

## 2019-12-26 ENCOUNTER — PATIENT OUTREACH (OUTPATIENT)
Dept: OTHER | Facility: OTHER | Age: 69
End: 2019-12-26
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
## 2019-12-31 ENCOUNTER — OFFICE VISIT (OUTPATIENT)
Dept: ENDOCRINOLOGY | Facility: CLINIC | Age: 69
End: 2019-12-31
Payer: MEDICARE

## 2019-12-31 VITALS
SYSTOLIC BLOOD PRESSURE: 138 MMHG | HEIGHT: 66 IN | WEIGHT: 121.94 LBS | DIASTOLIC BLOOD PRESSURE: 76 MMHG | HEART RATE: 71 BPM | BODY MASS INDEX: 19.6 KG/M2

## 2019-12-31 DIAGNOSIS — I10 BENIGN ESSENTIAL HTN: ICD-10-CM

## 2019-12-31 DIAGNOSIS — E04.1 THYROID NODULE: Primary | ICD-10-CM

## 2019-12-31 PROCEDURE — 99213 OFFICE O/P EST LOW 20 MIN: CPT | Mod: PBBFAC,PO | Performed by: INTERNAL MEDICINE

## 2019-12-31 PROCEDURE — 1159F MED LIST DOCD IN RCRD: CPT | Mod: ,,, | Performed by: INTERNAL MEDICINE

## 2019-12-31 PROCEDURE — 99213 OFFICE O/P EST LOW 20 MIN: CPT | Mod: S$PBB,,, | Performed by: INTERNAL MEDICINE

## 2019-12-31 PROCEDURE — 99999 PR PBB SHADOW E&M-EST. PATIENT-LVL III: CPT | Mod: PBBFAC,,, | Performed by: INTERNAL MEDICINE

## 2019-12-31 PROCEDURE — 99999 PR PBB SHADOW E&M-EST. PATIENT-LVL III: ICD-10-PCS | Mod: PBBFAC,,, | Performed by: INTERNAL MEDICINE

## 2019-12-31 PROCEDURE — 1125F PR PAIN SEVERITY QUANTIFIED, PAIN PRESENT: ICD-10-PCS | Mod: ,,, | Performed by: INTERNAL MEDICINE

## 2019-12-31 PROCEDURE — 1125F AMNT PAIN NOTED PAIN PRSNT: CPT | Mod: ,,, | Performed by: INTERNAL MEDICINE

## 2019-12-31 PROCEDURE — 1159F PR MEDICATION LIST DOCUMENTED IN MEDICAL RECORD: ICD-10-PCS | Mod: ,,, | Performed by: INTERNAL MEDICINE

## 2019-12-31 PROCEDURE — 99213 PR OFFICE/OUTPT VISIT, EST, LEVL III, 20-29 MIN: ICD-10-PCS | Mod: S$PBB,,, | Performed by: INTERNAL MEDICINE

## 2019-12-31 NOTE — PROGRESS NOTES
CHIEF COMPLAINT: Multinodular Goiter  69 year old being seen as a f/u. Benign left FNA 1/7/2016. States she started iodine drops. States eats very little sodium. Also vegan and does not eat out much. So was concerned about iodine           1/7/16  FINAL PATHOLOGIC DIAGNOSIS  Left thyroid, fine-needle aspiration:  [facility] Thyroid Cytology Category: Benign.  Benign follicular cells and some colloid are present.      PAST MEDICAL HISTORY/PAST SURGICAL HISTORY:  Reviewed in [facility]    SOCIAL HISTORY: No T/A    FAMILY HISTORY:  Father was on T4 replacement. No thyroid cancer. + DM 2.     MEDICATIONS/ALLERGIES: The patient's MedCard has been updated and reviewed.      ROS:   Constitutional: weight decreased with diet as well as exercise.   Eyes: No recent visual changes  ENT: No dysphagia  Cardiovascular: Denies current anginal symptoms  Respiratory: Denies current respiratory difficulty  Gastrointestinal: Denies recent bowel disturbances  GenitoUrinary - No dysuria  Skin: No new skin rash  Neurologic: No focal neurologic complaints  Remainder ROS negative        PE:    GENERAL: Well developed, well nourished.  NECK: Supple, trachea midline, Left nodule palpabel  CHEST: Resp even and unlabored, CTA bilateral.  CARDIAC: RRR, S1, S2 heard, no murmurs, rubs, S3, or S4        US SOFT TISSUE HEAD NECK THYROID    CLINICAL HISTORY:  Nontoxic single thyroid nodule    TECHNIQUE:  Ultrasound of the thyroid and cervical lymph nodes was performed.    COMPARISON:  11/05/2018 and 08/18/2017 and 11/24/2015    FINDINGS:  Right lobe measures 3.3 x 1.3 x 1.1 cm and left lobe measures 5.1 x 2.4 x 2.3 cm with normal thickness of the isthmus.  In the right lobe there is a 0.2 cm colloid cyst.  In the left lobe there is a complex predominantly hyperechoic solid lesion measuring 3.8 cm greatest diameter with internal vascular flow.  No microcalcifications or lymphadenopathy.      Impression       Dominant nodule in the left lobe has shown  mild growth across several comparisons.  This would meet biopsy criteria if clinically warranted.           ASSESSMENT/PLAN:  1. Thyroid Nodule- Benign Left FNA in 2016. US shows mild growth. Discussed option of FNA. Since growth is mild can repeat US in 6 months. If stable can do a yearly US.     2. HTN- BP controlled.       FOLLOWUP  TSH- 6 weeks  F/U 6 months TSH, Thyroid US

## 2020-01-03 ENCOUNTER — PES CALL (OUTPATIENT)
Dept: ADMINISTRATIVE | Facility: CLINIC | Age: 70
End: 2020-01-03

## 2020-01-16 ENCOUNTER — PATIENT OUTREACH (OUTPATIENT)
Dept: OTHER | Facility: OTHER | Age: 70
End: 2020-01-16
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
## 2020-01-17 ENCOUNTER — PATIENT MESSAGE (OUTPATIENT)
Dept: FAMILY MEDICINE | Facility: CLINIC | Age: 70
End: 2020-01-17

## 2020-01-17 ENCOUNTER — OFFICE VISIT (OUTPATIENT)
Dept: ORTHOPEDICS | Facility: CLINIC | Age: 70
End: 2020-01-17
Payer: MEDICARE

## 2020-01-17 VITALS
BODY MASS INDEX: 19.27 KG/M2 | HEART RATE: 61 BPM | HEIGHT: 66 IN | WEIGHT: 119.94 LBS | SYSTOLIC BLOOD PRESSURE: 157 MMHG | DIASTOLIC BLOOD PRESSURE: 57 MMHG

## 2020-01-17 DIAGNOSIS — S72.445A: Primary | ICD-10-CM

## 2020-01-17 PROCEDURE — 99214 OFFICE O/P EST MOD 30 MIN: CPT | Mod: S$PBB,57,ICN, | Performed by: NURSE PRACTITIONER

## 2020-01-17 PROCEDURE — 1125F PR PAIN SEVERITY QUANTIFIED, PAIN PRESENT: ICD-10-PCS | Mod: ,,, | Performed by: NURSE PRACTITIONER

## 2020-01-17 PROCEDURE — 99999 PR PBB SHADOW E&M-EST. PATIENT-LVL III: ICD-10-PCS | Mod: PBBFAC,,, | Performed by: NURSE PRACTITIONER

## 2020-01-17 PROCEDURE — 27516 TREAT THIGH FX GROWTH PLATE: CPT | Mod: S$PBB,LT,ICN, | Performed by: NURSE PRACTITIONER

## 2020-01-17 PROCEDURE — 1159F MED LIST DOCD IN RCRD: CPT | Mod: ,,, | Performed by: NURSE PRACTITIONER

## 2020-01-17 PROCEDURE — 1125F AMNT PAIN NOTED PAIN PRSNT: CPT | Mod: ,,, | Performed by: NURSE PRACTITIONER

## 2020-01-17 PROCEDURE — 27516 TREAT THIGH FX GROWTH PLATE: CPT | Mod: PBBFAC,PN | Performed by: NURSE PRACTITIONER

## 2020-01-17 PROCEDURE — 99999 PR PBB SHADOW E&M-EST. PATIENT-LVL III: CPT | Mod: PBBFAC,,, | Performed by: NURSE PRACTITIONER

## 2020-01-17 PROCEDURE — 99213 OFFICE O/P EST LOW 20 MIN: CPT | Mod: PBBFAC,PN,25 | Performed by: NURSE PRACTITIONER

## 2020-01-17 PROCEDURE — 99214 PR OFFICE/OUTPT VISIT, EST, LEVL IV, 30-39 MIN: ICD-10-PCS | Mod: S$PBB,57,ICN, | Performed by: NURSE PRACTITIONER

## 2020-01-17 PROCEDURE — 1159F PR MEDICATION LIST DOCUMENTED IN MEDICAL RECORD: ICD-10-PCS | Mod: ,,, | Performed by: NURSE PRACTITIONER

## 2020-01-17 PROCEDURE — 27516: ICD-10-PCS | Mod: S$PBB,LT,ICN, | Performed by: NURSE PRACTITIONER
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
## 2020-01-20 ENCOUNTER — PATIENT MESSAGE (OUTPATIENT)
Dept: ORTHOPEDICS | Facility: CLINIC | Age: 70
End: 2020-01-20

## 2020-01-20 DIAGNOSIS — S72.445A: Primary | ICD-10-CM

## 2020-01-23 ENCOUNTER — PATIENT MESSAGE (OUTPATIENT)
Dept: SURGERY | Facility: HOSPITAL | Age: 70
End: 2020-01-23

## 2020-01-23 ENCOUNTER — HOSPITAL ENCOUNTER (OUTPATIENT)
Dept: RADIOLOGY | Facility: HOSPITAL | Age: 70
Discharge: HOME | End: 2020-01-23
Attending: ORTHOPAEDIC SURGERY
Payer: MEDICARE

## 2020-01-23 ENCOUNTER — OFFICE VISIT (OUTPATIENT)
Dept: ORTHOPEDICS | Facility: CLINIC | Age: 70
End: 2020-01-23
Payer: MEDICARE

## 2020-01-23 ENCOUNTER — PATIENT MESSAGE (OUTPATIENT)
Dept: FAMILY MEDICINE | Facility: CLINIC | Age: 70
End: 2020-01-23

## 2020-01-23 VITALS
HEART RATE: 70 BPM | WEIGHT: 119 LBS | HEIGHT: 66 IN | DIASTOLIC BLOOD PRESSURE: 65 MMHG | SYSTOLIC BLOOD PRESSURE: 151 MMHG | BODY MASS INDEX: 19.13 KG/M2

## 2020-01-23 DIAGNOSIS — Z01.818 PRE-OP TESTING: ICD-10-CM

## 2020-01-23 DIAGNOSIS — S72.445A: Primary | ICD-10-CM

## 2020-01-23 DIAGNOSIS — S72.445A: ICD-10-CM

## 2020-01-23 PROCEDURE — 73552 X-RAY EXAM OF FEMUR 2/>: CPT | Mod: TC,PO,LT

## 2020-01-23 PROCEDURE — 99999 PR PBB SHADOW E&M-EST. PATIENT-LVL III: CPT | Mod: PBBFAC,,, | Performed by: ORTHOPAEDIC SURGERY

## 2020-01-23 PROCEDURE — 99024 PR POST-OP FOLLOW-UP VISIT: ICD-10-PCS | Mod: S$PBB,,, | Performed by: ORTHOPAEDIC SURGERY

## 2020-01-23 PROCEDURE — 1159F MED LIST DOCD IN RCRD: CPT | Mod: ,,, | Performed by: ORTHOPAEDIC SURGERY

## 2020-01-23 PROCEDURE — 99999 PR PBB SHADOW E&M-EST. PATIENT-LVL III: ICD-10-PCS | Mod: PBBFAC,,, | Performed by: ORTHOPAEDIC SURGERY

## 2020-01-23 PROCEDURE — 1159F PR MEDICATION LIST DOCUMENTED IN MEDICAL RECORD: ICD-10-PCS | Mod: ,,, | Performed by: ORTHOPAEDIC SURGERY

## 2020-01-23 PROCEDURE — 73552 XR FEMUR 2 VIEW LEFT: ICD-10-PCS | Mod: 26,LT,, | Performed by: RADIOLOGY

## 2020-01-23 PROCEDURE — 1125F AMNT PAIN NOTED PAIN PRSNT: CPT | Mod: ,,, | Performed by: ORTHOPAEDIC SURGERY

## 2020-01-23 PROCEDURE — 1125F PR PAIN SEVERITY QUANTIFIED, PAIN PRESENT: ICD-10-PCS | Mod: ,,, | Performed by: ORTHOPAEDIC SURGERY

## 2020-01-23 PROCEDURE — 99024 POSTOP FOLLOW-UP VISIT: CPT | Mod: S$PBB,,, | Performed by: ORTHOPAEDIC SURGERY

## 2020-01-23 PROCEDURE — 99213 OFFICE O/P EST LOW 20 MIN: CPT | Mod: PBBFAC,25,PN | Performed by: ORTHOPAEDIC SURGERY

## 2020-01-23 PROCEDURE — 73552 X-RAY EXAM OF FEMUR 2/>: CPT | Mod: 26,LT,, | Performed by: RADIOLOGY

## 2020-01-23 RX ORDER — LIDOCAINE HYDROCHLORIDE 10 MG/ML
1 INJECTION, SOLUTION EPIDURAL; INFILTRATION; INTRACAUDAL; PERINEURAL ONCE
Status: CANCELLED | OUTPATIENT
Start: 2020-01-23 | End: 2020-01-23
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
## 2020-01-24 ENCOUNTER — OFFICE VISIT (OUTPATIENT)
Dept: FAMILY MEDICINE | Facility: CLINIC | Age: 70
End: 2020-01-24
Payer: MEDICARE

## 2020-01-24 VITALS
HEART RATE: 72 BPM | SYSTOLIC BLOOD PRESSURE: 148 MMHG | BODY MASS INDEX: 19.29 KG/M2 | WEIGHT: 120 LBS | DIASTOLIC BLOOD PRESSURE: 78 MMHG | HEIGHT: 66 IN | OXYGEN SATURATION: 99 % | RESPIRATION RATE: 18 BRPM

## 2020-01-24 DIAGNOSIS — Z01.810 PREOPERATIVE CARDIOVASCULAR EXAMINATION: Primary | ICD-10-CM

## 2020-01-24 DIAGNOSIS — I70.0 AORTIC ATHEROSCLEROSIS: ICD-10-CM

## 2020-01-24 DIAGNOSIS — I10 ESSENTIAL HYPERTENSION: ICD-10-CM

## 2020-01-24 PROCEDURE — 99999 PR PBB SHADOW E&M-EST. PATIENT-LVL III: ICD-10-PCS | Mod: PBBFAC,,, | Performed by: INTERNAL MEDICINE

## 2020-01-24 PROCEDURE — 99213 OFFICE O/P EST LOW 20 MIN: CPT | Mod: PBBFAC,PN | Performed by: INTERNAL MEDICINE

## 2020-01-24 PROCEDURE — 1126F AMNT PAIN NOTED NONE PRSNT: CPT | Mod: ,,, | Performed by: INTERNAL MEDICINE

## 2020-01-24 PROCEDURE — 1159F PR MEDICATION LIST DOCUMENTED IN MEDICAL RECORD: ICD-10-PCS | Mod: ,,, | Performed by: INTERNAL MEDICINE

## 2020-01-24 PROCEDURE — 99214 PR OFFICE/OUTPT VISIT, EST, LEVL IV, 30-39 MIN: ICD-10-PCS | Mod: S$PBB,,, | Performed by: INTERNAL MEDICINE

## 2020-01-24 PROCEDURE — 1126F PR PAIN SEVERITY QUANTIFIED, NO PAIN PRESENT: ICD-10-PCS | Mod: ,,, | Performed by: INTERNAL MEDICINE

## 2020-01-24 PROCEDURE — 99999 PR PBB SHADOW E&M-EST. PATIENT-LVL III: CPT | Mod: PBBFAC,,, | Performed by: INTERNAL MEDICINE

## 2020-01-24 PROCEDURE — 1159F MED LIST DOCD IN RCRD: CPT | Mod: ,,, | Performed by: INTERNAL MEDICINE

## 2020-01-24 PROCEDURE — 99214 OFFICE O/P EST MOD 30 MIN: CPT | Mod: S$PBB,,, | Performed by: INTERNAL MEDICINE

## 2020-01-24 NOTE — PROGRESS NOTES
Assessment and Plan:    1. Preoperative cardiovascular examination  RCRI risk factors include: no known RCRI risk factors. As such, per RCRI the risk of cardiac death, nonfatal myocardial infarction, or nonfatal cardiac arrest is 0.4% and the risk of myocardial infarction, pulmonary edema, ventricular fibrillation, primary cardiac arrest, or complete heart block is 0.5%.  Overall this patient can be considered low risk for this intermediate risk procedure. No further cardiac testing is recommended at this time.     Patient denies any symptoms (as per HPI) concerning for undiagnosed lung disease including ANG. Would not recommend obtaining chest X-ray, sleep study, or PFTs at this time. Patient is a non-smoker. We discussed the benefits of early mobilization and deep breathing after surgery.      Screened patient for alcohol misuse, use of illicit drugs, and personal or family history of anesthetic complications or bleeding diathesis and no substantial concerns were identified.     All current medications were reviewed and at this time no changes to medications are recommended prior to surgery.     I recommend use of standard pre-op and post-op precautions for this patient. In my opinion, she is medically optimized for this procedure, and can proceed without further evaluation.     2. Essential hypertension  Above goal today due to pain and anxiety about upcoming procedure, but typically well controlled. Continue losartan 25.     3. Aortic atherosclerosis  Noted on previous imaging. Have discussed statin previously and patient wanted to hold off, currently working on diet.     ______________________________________________________________________  Subjective:    Chief Complaint:  Preoperative exam    HPI:  Mindi is a 69 y.o. year old female here for preoperative exam.     She was seen yesterday with Dr. Gutiérrez for closed non-displaced fracture of distal epiphysis of left femur, planning ORIF on 1/28.     She has no  known personal history of cardiovascular disease (no CAD, PAD, or strokes). She has no history of heart failure, diabetes or chronic kidney disease. She denies symptoms of angina, syncope, dyspnea or palpitations. She is able to walk up 2 flights of stairs without chest pain or shortness of breath.     She denies exercise intolerance, unexplained dyspnea, or cough. She has a history of obstructive sleep apnea and uses CPAP regularly.     BP is typically well controlled at home on losartan 25 mg daily. Has been higher in the last 2 weeks, presumably due to pain.    Medications:  Current Outpatient Medications on File Prior to Visit   Medication Sig Dispense Refill    b complex vitamins tablet Take 1 tablet by mouth as needed.       CALCIUM CARBONATE/VITAMIN D3 (VITAMIN D-3 ORAL) Take 1 tablet by mouth once daily.       HYDROcodone-acetaminophen (NORCO) 5-325 mg per tablet Take 1 tablet by mouth every 6 (six) hours as needed for Pain. 12 tablet 0    losartan (COZAAR) 25 MG tablet Take 1 tablet (25 mg total) by mouth once daily. 90 tablet 2    magnesium 30 mg Tab Take 500 mg by mouth once.      multivitamin (ONE DAILY MULTIVITAMIN) per tablet Take 1 tablet by mouth as needed.       mupirocin (BACTROBAN) 2 % ointment Apply topically 3 (three) times daily. 1 Tube 0    ondansetron (ZOFRAN-ODT) 8 MG TbDL Take 1 tablet (8 mg total) by mouth every 8 (eight) hours as needed. 12 tablet 0    TURMERIC, BULK, MISC 450 mg by Misc.(Non-Drug; Combo Route) route once daily.       No current facility-administered medications on file prior to visit.        Review of Systems:  Review of Systems   Constitutional: Negative for chills and fever.   Respiratory: Negative for chest tightness, shortness of breath and wheezing.    Cardiovascular: Negative for chest pain, palpitations and leg swelling.   Musculoskeletal: Positive for arthralgias and gait problem.   Neurological: Negative for dizziness, seizures, syncope and  "light-headedness.       Past Medical History:  Past Medical History:   Diagnosis Date    Cataract     OU    GERD (gastroesophageal reflux disease)     Hemorrhoid     Hypertension     Mild vitamin D deficiency     Osteopenia        Objective:    Vitals:  Vitals:    01/24/20 1504 01/24/20 1544   BP: (!) 150/80 (!) 148/78   Pulse: 72    Resp: 18    SpO2: 99%    Weight: 54.4 kg (120 lb)    Height: 5' 6" (1.676 m)    PainSc: 0-No pain        Physical Exam   Constitutional: She is oriented to person, place, and time. She appears well-developed and well-nourished. No distress.   HENT:   Mouth/Throat: Oropharynx is clear and moist.   Eyes: No scleral icterus.   Cardiovascular: Normal rate and regular rhythm.   No murmur heard.  Pulmonary/Chest: Effort normal and breath sounds normal. No respiratory distress. She has no wheezes.   Musculoskeletal: She exhibits no edema.   left knee in immobilizer, trace distal edema   Neurological: She is alert and oriented to person, place, and time.   Skin: Skin is warm and dry.   Psychiatric: She has a normal mood and affect. Her behavior is normal.   Vitals reviewed.      Data:  Previous labs reviewed and pertinent for CBC and CMP yesterday unremarkable.      Jeana Carpenter MD  Internal Medicine  "

## 2020-01-26 ENCOUNTER — PATIENT MESSAGE (OUTPATIENT)
Dept: SURGERY | Facility: HOSPITAL | Age: 70
End: 2020-01-26

## 2020-01-27 ENCOUNTER — ANESTHESIA EVENT (OUTPATIENT)
Dept: SURGERY | Facility: HOSPITAL | Age: 70
End: 2020-01-27
Payer: MEDICARE

## 2020-01-27 ENCOUNTER — PATIENT MESSAGE (OUTPATIENT)
Dept: SURGERY | Facility: HOSPITAL | Age: 70
End: 2020-01-27

## 2020-01-28 ENCOUNTER — HOSPITAL ENCOUNTER (OUTPATIENT)
Facility: HOSPITAL | Age: 70
Discharge: HOME | End: 2020-01-28
Attending: ORTHOPAEDIC SURGERY | Admitting: ORTHOPAEDIC SURGERY
Payer: MEDICARE

## 2020-01-28 ENCOUNTER — PATIENT MESSAGE (OUTPATIENT)
Dept: SURGERY | Facility: HOSPITAL | Age: 70
End: 2020-01-28

## 2020-01-28 ENCOUNTER — HOSPITAL ENCOUNTER (OUTPATIENT)
Dept: RADIOLOGY | Facility: HOSPITAL | Age: 70
Discharge: HOME | End: 2020-01-28
Attending: ORTHOPAEDIC SURGERY | Admitting: ORTHOPAEDIC SURGERY
Payer: MEDICARE

## 2020-01-28 ENCOUNTER — ANESTHESIA (OUTPATIENT)
Dept: SURGERY | Facility: HOSPITAL | Age: 70
End: 2020-01-28
Payer: MEDICARE

## 2020-01-28 DIAGNOSIS — Z01.818 PRE-OP TESTING: ICD-10-CM

## 2020-01-28 DIAGNOSIS — S72.445A: ICD-10-CM

## 2020-01-28 PROCEDURE — 36000710: Mod: PO | Performed by: ORTHOPAEDIC SURGERY

## 2020-01-28 PROCEDURE — D9220A PRA ANESTHESIA: ICD-10-PCS | Mod: CRNA,,, | Performed by: NURSE ANESTHETIST, CERTIFIED REGISTERED

## 2020-01-28 PROCEDURE — D9220A PRA ANESTHESIA: ICD-10-PCS | Mod: ANES,,, | Performed by: ANESTHESIOLOGY

## 2020-01-28 PROCEDURE — 71000015 HC POSTOP RECOV 1ST HR: Mod: PO | Performed by: ORTHOPAEDIC SURGERY

## 2020-01-28 PROCEDURE — 37000008 HC ANESTHESIA 1ST 15 MINUTES: Mod: PO | Performed by: ORTHOPAEDIC SURGERY

## 2020-01-28 PROCEDURE — 27509 PR PERCUT FIX DISTAL FEMUR: ICD-10-PCS | Mod: 58,LT,, | Performed by: ORTHOPAEDIC SURGERY

## 2020-01-28 PROCEDURE — 63600175 PHARM REV CODE 636 W HCPCS: Mod: PO | Performed by: ORTHOPAEDIC SURGERY

## 2020-01-28 PROCEDURE — 37000009 HC ANESTHESIA EA ADD 15 MINS: Mod: PO | Performed by: ORTHOPAEDIC SURGERY

## 2020-01-28 PROCEDURE — C1713 ANCHOR/SCREW BN/BN,TIS/BN: HCPCS | Mod: PO | Performed by: ORTHOPAEDIC SURGERY

## 2020-01-28 PROCEDURE — 63600175 PHARM REV CODE 636 W HCPCS: Mod: PO | Performed by: NURSE ANESTHETIST, CERTIFIED REGISTERED

## 2020-01-28 PROCEDURE — 25000003 PHARM REV CODE 250: Mod: PO | Performed by: NURSE ANESTHETIST, CERTIFIED REGISTERED

## 2020-01-28 PROCEDURE — 27509 TREATMENT OF THIGH FRACTURE: CPT | Mod: 58,LT,, | Performed by: ORTHOPAEDIC SURGERY

## 2020-01-28 PROCEDURE — 71000033 HC RECOVERY, INTIAL HOUR: Mod: PO | Performed by: ORTHOPAEDIC SURGERY

## 2020-01-28 PROCEDURE — 63600175 PHARM REV CODE 636 W HCPCS: Mod: PO | Performed by: ANESTHESIOLOGY

## 2020-01-28 PROCEDURE — 76000 FLUOROSCOPY <1 HR PHYS/QHP: CPT | Mod: TC,PO

## 2020-01-28 PROCEDURE — D9220A PRA ANESTHESIA: Mod: ANES,,, | Performed by: ANESTHESIOLOGY

## 2020-01-28 PROCEDURE — S0028 INJECTION, FAMOTIDINE, 20 MG: HCPCS | Mod: PO | Performed by: ANESTHESIOLOGY

## 2020-01-28 PROCEDURE — 36000711: Mod: PO | Performed by: ORTHOPAEDIC SURGERY

## 2020-01-28 PROCEDURE — D9220A PRA ANESTHESIA: Mod: CRNA,,, | Performed by: NURSE ANESTHETIST, CERTIFIED REGISTERED

## 2020-01-28 PROCEDURE — 27201423 OPTIME MED/SURG SUP & DEVICES STERILE SUPPLY: Mod: PO | Performed by: ORTHOPAEDIC SURGERY

## 2020-01-28 PROCEDURE — 25000003 PHARM REV CODE 250: Mod: PO | Performed by: ANESTHESIOLOGY

## 2020-01-28 DEVICE — SCREW BONE T10 3.5X32MM: Type: IMPLANTABLE DEVICE | Site: FEMUR | Status: FUNCTIONAL

## 2020-01-28 DEVICE — IMPLANTABLE DEVICE: Type: IMPLANTABLE DEVICE | Site: FEMUR | Status: FUNCTIONAL

## 2020-01-28 DEVICE — SCREW BONE CANNULATED 6.5X80MM: Type: IMPLANTABLE DEVICE | Site: FEMUR | Status: FUNCTIONAL

## 2020-01-28 RX ORDER — SCOLOPAMINE TRANSDERMAL SYSTEM 1 MG/1
1 PATCH, EXTENDED RELEASE TRANSDERMAL
Status: DISCONTINUED | OUTPATIENT
Start: 2020-01-28 | End: 2020-01-28 | Stop reason: HOSPADM

## 2020-01-28 RX ORDER — KETOROLAC TROMETHAMINE 30 MG/ML
30 INJECTION, SOLUTION INTRAMUSCULAR; INTRAVENOUS ONCE
Status: DISCONTINUED | OUTPATIENT
Start: 2020-01-28 | End: 2020-01-28 | Stop reason: HOSPADM

## 2020-01-28 RX ORDER — LIDOCAINE HCL/PF 100 MG/5ML
SYRINGE (ML) INTRAVENOUS
Status: DISCONTINUED | OUTPATIENT
Start: 2020-01-28 | End: 2020-01-28

## 2020-01-28 RX ORDER — KETAMINE HCL IN 0.9 % NACL 50 MG/5 ML
SYRINGE (ML) INTRAVENOUS
Status: DISCONTINUED | OUTPATIENT
Start: 2020-01-28 | End: 2020-01-28

## 2020-01-28 RX ORDER — NAPROXEN SODIUM 220 MG/1
81 TABLET, FILM COATED ORAL DAILY
Qty: 36 TABLET | Refills: 0 | Status: SHIPPED | OUTPATIENT
Start: 2020-01-28 | End: 2022-09-21 | Stop reason: DRUGHIGH

## 2020-01-28 RX ORDER — OXYCODONE HYDROCHLORIDE 5 MG/1
5 TABLET ORAL EVERY 4 HOURS PRN
Qty: 33 TABLET | Refills: 0 | Status: SHIPPED | OUTPATIENT
Start: 2020-01-28 | End: 2020-10-06

## 2020-01-28 RX ORDER — HYDROMORPHONE HYDROCHLORIDE 2 MG/ML
0.2 INJECTION, SOLUTION INTRAMUSCULAR; INTRAVENOUS; SUBCUTANEOUS EVERY 5 MIN PRN
Status: DISCONTINUED | OUTPATIENT
Start: 2020-01-28 | End: 2020-01-28 | Stop reason: HOSPADM

## 2020-01-28 RX ORDER — ACETAMINOPHEN 500 MG
1000 TABLET ORAL EVERY 8 HOURS
Qty: 90 TABLET | Refills: 0 | Status: SHIPPED | OUTPATIENT
Start: 2020-01-28 | End: 2023-06-06

## 2020-01-28 RX ORDER — MIDAZOLAM HYDROCHLORIDE 1 MG/ML
INJECTION, SOLUTION INTRAMUSCULAR; INTRAVENOUS
Status: DISCONTINUED | OUTPATIENT
Start: 2020-01-28 | End: 2020-01-28

## 2020-01-28 RX ORDER — ROCURONIUM BROMIDE 10 MG/ML
INJECTION, SOLUTION INTRAVENOUS
Status: DISCONTINUED | OUTPATIENT
Start: 2020-01-28 | End: 2020-01-28

## 2020-01-28 RX ORDER — TRAMADOL HYDROCHLORIDE 50 MG/1
50 TABLET ORAL EVERY 4 HOURS PRN
Qty: 44 TABLET | Refills: 0 | Status: SHIPPED | OUTPATIENT
Start: 2020-01-28 | End: 2020-10-06

## 2020-01-28 RX ORDER — FENTANYL CITRATE 50 UG/ML
INJECTION, SOLUTION INTRAMUSCULAR; INTRAVENOUS
Status: DISCONTINUED | OUTPATIENT
Start: 2020-01-28 | End: 2020-01-28

## 2020-01-28 RX ORDER — ONDANSETRON 2 MG/ML
INJECTION INTRAMUSCULAR; INTRAVENOUS
Status: DISCONTINUED | OUTPATIENT
Start: 2020-01-28 | End: 2020-01-28

## 2020-01-28 RX ORDER — SODIUM CHLORIDE, SODIUM LACTATE, POTASSIUM CHLORIDE, CALCIUM CHLORIDE 600; 310; 30; 20 MG/100ML; MG/100ML; MG/100ML; MG/100ML
INJECTION, SOLUTION INTRAVENOUS CONTINUOUS
Status: DISPENSED | OUTPATIENT
Start: 2020-01-28

## 2020-01-28 RX ORDER — LIDOCAINE HYDROCHLORIDE 10 MG/ML
1 INJECTION, SOLUTION EPIDURAL; INFILTRATION; INTRACAUDAL; PERINEURAL ONCE
Status: ACTIVE | OUTPATIENT
Start: 2020-01-28

## 2020-01-28 RX ORDER — OXYCODONE HYDROCHLORIDE 5 MG/1
5 TABLET ORAL
Status: DISCONTINUED | OUTPATIENT
Start: 2020-01-28 | End: 2020-01-28 | Stop reason: HOSPADM

## 2020-01-28 RX ORDER — PROPOFOL 10 MG/ML
VIAL (ML) INTRAVENOUS
Status: DISCONTINUED | OUTPATIENT
Start: 2020-01-28 | End: 2020-01-28

## 2020-01-28 RX ORDER — FAMOTIDINE 10 MG/ML
20 INJECTION INTRAVENOUS ONCE
Status: COMPLETED | OUTPATIENT
Start: 2020-01-28 | End: 2020-01-28

## 2020-01-28 RX ORDER — ACETAMINOPHEN 10 MG/ML
INJECTION, SOLUTION INTRAVENOUS
Status: DISCONTINUED | OUTPATIENT
Start: 2020-01-28 | End: 2020-01-28

## 2020-01-28 RX ORDER — FENTANYL CITRATE 50 UG/ML
25 INJECTION, SOLUTION INTRAMUSCULAR; INTRAVENOUS EVERY 5 MIN PRN
Status: DISCONTINUED | OUTPATIENT
Start: 2020-01-28 | End: 2020-01-28 | Stop reason: HOSPADM

## 2020-01-28 RX ORDER — LIDOCAINE HYDROCHLORIDE 10 MG/ML
1 INJECTION, SOLUTION EPIDURAL; INFILTRATION; INTRACAUDAL; PERINEURAL ONCE
Status: DISCONTINUED | OUTPATIENT
Start: 2020-01-28 | End: 2020-01-28

## 2020-01-28 RX ORDER — CEFAZOLIN SODIUM 2 G/50ML
2 SOLUTION INTRAVENOUS
Status: COMPLETED | OUTPATIENT
Start: 2020-01-28 | End: 2020-01-28

## 2020-01-28 RX ADMIN — ROCURONIUM BROMIDE 30 MG: 10 INJECTION, SOLUTION INTRAVENOUS at 07:01

## 2020-01-28 RX ADMIN — HYDROMORPHONE HYDROCHLORIDE 0.2 MG: 2 INJECTION INTRAMUSCULAR; INTRAVENOUS; SUBCUTANEOUS at 09:01

## 2020-01-28 RX ADMIN — CEFAZOLIN SODIUM 2 G: 2 SOLUTION INTRAVENOUS at 07:01

## 2020-01-28 RX ADMIN — FENTANYL CITRATE 50 MCG: 50 INJECTION, SOLUTION INTRAMUSCULAR; INTRAVENOUS at 07:01

## 2020-01-28 RX ADMIN — FENTANYL CITRATE 50 MCG: 50 INJECTION, SOLUTION INTRAMUSCULAR; INTRAVENOUS at 08:01

## 2020-01-28 RX ADMIN — SCOPALAMINE 1 PATCH: 1 PATCH, EXTENDED RELEASE TRANSDERMAL at 07:01

## 2020-01-28 RX ADMIN — ACETAMINOPHEN 1000 MG: 10 INJECTION, SOLUTION INTRAVENOUS at 08:01

## 2020-01-28 RX ADMIN — FENTANYL CITRATE 100 MCG: 50 INJECTION INTRAMUSCULAR; INTRAVENOUS at 09:01

## 2020-01-28 RX ADMIN — MIDAZOLAM HYDROCHLORIDE 2 MG: 1 INJECTION, SOLUTION INTRAMUSCULAR; INTRAVENOUS at 07:01

## 2020-01-28 RX ADMIN — LIDOCAINE HYDROCHLORIDE 75 MG: 20 INJECTION PARENTERAL at 07:01

## 2020-01-28 RX ADMIN — Medication 25 MG: at 07:01

## 2020-01-28 RX ADMIN — SODIUM CHLORIDE, SODIUM LACTATE, POTASSIUM CHLORIDE, AND CALCIUM CHLORIDE: .6; .31; .03; .02 INJECTION, SOLUTION INTRAVENOUS at 07:01

## 2020-01-28 RX ADMIN — ONDANSETRON 4 MG: 2 INJECTION, SOLUTION INTRAMUSCULAR; INTRAVENOUS at 08:01

## 2020-01-28 RX ADMIN — PROPOFOL 170 MG: 10 INJECTION, EMULSION INTRAVENOUS at 07:01

## 2020-01-28 RX ADMIN — SODIUM CHLORIDE, SODIUM LACTATE, POTASSIUM CHLORIDE, AND CALCIUM CHLORIDE: .6; .31; .03; .02 INJECTION, SOLUTION INTRAVENOUS at 08:01

## 2020-01-28 RX ADMIN — OXYCODONE HYDROCHLORIDE 5 MG: 5 TABLET ORAL at 09:01

## 2020-01-28 RX ADMIN — FAMOTIDINE 20 MG: 10 INJECTION, SOLUTION INTRAVENOUS at 07:01
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
## 2020-01-28 NOTE — DISCHARGE INSTRUCTIONS
LEFT FEMUR ORIF/OPEN REDUCTION AND REPAIR OF FRACTURE        Knee HINGED BRACE  After surgery until first follow-up visit:    DOS:   Keep leg elevated (toes above your nose) for several hours per day while recovering from surgery.   Use walker for comfort. NON WEIGHT BEARING AFTER SURGERY   Minimal activity and advance diet as tolerated   Keep operative site clean and dry for 48 hours   LEAVE YOUR OPERATIVE SITE/DRESSING ALONE UNTIL CLEARED BY YOUR SURGEON.   clean dry dressing over incision site until your follow up visit with you physician.   Keep ice pack UNDER YOUR knee for 48-72 hours. Ice on for 30 minutes of each hour while awake to reduce pain and swelling.   Resume home medications .    Physical therapy will be done by home health for two weeks after surgery AS PER DR. DE LA ROSA'S RECOMMENDATION.   PLEASE TAKE ASPIRIN 325MG DAILY FOR ONE MONTH    DONT:   SPONGE BATHE UNTIL CLEARED TO HAVE A SHOWER.   Do not soak in tub.   No driving for 24 hours or while taking narcotic pain medication   DO NOT TAKE ADDITIONAL TYLENOL/ACETAMINOPHEN WHILE TAKING NARCOTIC PAIN MEDICATION THAT CONTAINS TYLENOL/ACETAMINOPHEN.    CALL PHYSICIAN FOR ANY QUESTIONS OR CONCERNS.    You will have a return appointment for 2 weeks

## 2020-01-28 NOTE — OR NURSING
Called into waiting room for patient family. None present. Called number provided on chart, no answer. Mrs. Aj Lux at  stated family reported friend wanted to be called for . Dr. Gutiérrez attempted to speak to family or friends but none available

## 2020-01-28 NOTE — ANESTHESIA POSTPROCEDURE EVALUATION
Anesthesia Post Evaluation    Patient: Mindi Agudelo    Procedure(s) Performed: Procedure(s) (LRB):  ORIF, FRACTURE, FEMUR (Left)    Final Anesthesia Type: general    Patient location during evaluation: PACU  Patient participation: Yes- Able to Participate  Level of consciousness: awake and alert  Post-procedure vital signs: reviewed and stable  Pain management: adequate  Airway patency: patent    PONV status at discharge: No PONV  Anesthetic complications: no      Cardiovascular status: hemodynamically stable  Respiratory status: unassisted and room air  Hydration status: euvolemic  Follow-up not needed.          Vitals Value Taken Time   /68 1/28/2020  9:46 AM   Temp 36.3 °C (97.3 °F) 1/28/2020  8:41 AM   Pulse 56 1/28/2020  9:46 AM   Resp 14 1/28/2020  9:46 AM   SpO2 100 % 1/28/2020  9:46 AM         Event Time     Out of Recovery 09:41:00          Pain/Linda Score: Pain Rating Prior to Med Admin: 7 (1/28/2020  9:31 AM)  Linda Score: 8 (1/28/2020  9:16 AM)

## 2020-01-28 NOTE — INTERVAL H&P NOTE
The patient has been examined and the H&P has been reviewed:    I concur with the findings and no changes have occurred since H&P was written.    Anesthesia/Surgery risks, benefits and alternative options discussed and understood by patient/family.          Active Hospital Problems    Diagnosis  POA    Closed nondisplaced fracture of lower epiphysis of left femur [S70.243R]  Yes      Resolved Hospital Problems   No resolved problems to display.

## 2020-01-28 NOTE — ANESTHESIA PREPROCEDURE EVALUATION
01/28/2020  Mindi Agudelo is a 69 y.o., female.    Anesthesia Evaluation    I have reviewed the Patient Summary Reports.    I have reviewed the Nursing Notes.   I have reviewed the Medications.     Review of Systems  Anesthesia Hx:  PONV   Hematology/Oncology:        Hematology Comments: Taoist - no blood products   Cardiovascular:   Exercise tolerance: good  Functional Capacity 6 METS    Pulmonary:   Sleep Apnea, CPAP    Education provided regarding risk of obstructive sleep apnea     Hepatic/GI:   GERD, well controlled    Musculoskeletal:   Left distal femur   Neurological:  Neurology Normal        Physical Exam  General:  Well nourished    Airway/Jaw/Neck:  Airway Findings: Mouth Opening: Normal Tongue: Normal  General Airway Assessment: Adult  Mallampati: II  Improves to II with phonation.      Dental:  Dental Findings: In tact, Lower retainerRight upper molar fracture. Left two upper teeth fracture   Chest/Lungs:  Chest/Lungs Findings: Clear to auscultation, Normal Respiratory Rate         Mental Status:  Mental Status Findings:  Cooperative, Alert and Oriented, Anxious         Anesthesia Plan  Type of Anesthesia, risks & benefits discussed:  Anesthesia Type:  general  Patient's Preference:   Intra-op Monitoring Plan: standard ASA monitors  Intra-op Monitoring Plan Comments:   Post Op Pain Control Plan:   Post Op Pain Control Plan Comments:   Induction:   IV and Inhalation  Beta Blocker:  Patient is not currently on a Beta-Blocker (No further documentation required).       Informed Consent: Patient understands risks and agrees with Anesthesia plan.  Questions answered. Anesthesia consent signed with patient.  ASA Score: 2     Day of Surgery Review of History & Physical:            Ready For Surgery From Anesthesia Perspective.

## 2020-01-28 NOTE — TRANSFER OF CARE
"Anesthesia Transfer of Care Note    Patient: Mindi Agudelo    Procedure(s) Performed: Procedure(s) (LRB):  ORIF, FRACTURE, FEMUR (Left)    Patient location: PACU    Anesthesia Type: general    Transport from OR: Transported from OR on room air with adequate spontaneous ventilation    Post pain: adequate analgesia    Post assessment: no apparent anesthetic complications and tolerated procedure well    Post vital signs: stable    Level of consciousness: awake and alert    Nausea/Vomiting: no nausea/vomiting    Complications: none    Transfer of care protocol was followed      Last vitals:   Visit Vitals  BP (!) 169/67 (BP Location: Right arm, Patient Position: Lying)   Pulse (!) 58   Temp 36.3 °C (97.3 °F) (Skin)   Resp 16   Ht 5' 6" (1.676 m)   Wt 54.4 kg (120 lb)   SpO2 100%   Breastfeeding? No   BMI 19.37 kg/m²     "

## 2020-01-28 NOTE — ANESTHESIA PROCEDURE NOTES
Intubation  Performed by: Julia Morales CRNA  Authorized by: Julia Morales CRNA     Intubation:     Induction:  Intravenous    Intubated:  Postinduction    Mask Ventilation:  Easy mask    Attempts:  1    Attempted By:  CRNA    Method of Intubation:  Direct    Blade:  Norris 2    Laryngeal View Grade: Grade I - full view of chords      Difficult Airway Encountered?: No      Complications:  None    Airway Device:  Oral endotracheal tube    Airway Device Size:  7.0    Style/Cuff Inflation:  Cuffed    Inflation Amount (mL):  5    Tube secured:  21    Secured at:  The lips    Placement Verified By:  Capnometry    Complicating Factors:  None    Findings Post-Intubation:  BS equal bilateral and atraumatic/condition of teeth unchanged

## 2020-01-28 NOTE — DISCHARGE SUMMARY
[facility]  Short Stay  Discharge Summary    Admit Date: 1/28/2020    Discharge Date and Time:  01/28/2020 7:40 AM      Discharge Attending Physician: Teodoro Gutiérrez MD     Hospital Course (synopsis of major diagnoses, care, treatment, and services provided during the course of the hospital stay):   After an uneventful ORIF distal femur, the patient did well post-operatively with PT. Pain is now controlled with PO pain meds. At this time it is felt that the patient has reached maximal hospital benefit and is ready for discharge.      Final Diagnoses:    Principal Problem: Closed nondisplaced fracture of lower epiphysis of left femur   Secondary Diagnoses:   Active Hospital Problems    Diagnosis  POA    *Closed nondisplaced fracture of lower epiphysis of left femur [S72.445A]  Yes      Resolved Hospital Problems   No resolved problems to display.       Discharged Condition: good    Disposition: Home or Self Care    Follow up/Patient Instructions:    Medications:  Reconciled Home Medications:      Medication List      START taking these medications    acetaminophen 500 MG tablet  Commonly known as:  TYLENOL  Take 2 tablets (1,000 mg total) by mouth every 8 (eight) hours.     aspirin 81 MG Chew  Take 1 tablet (81 mg total) by mouth once daily.     oxyCODONE 5 MG immediate release tablet  Commonly known as:  ROXICODONE  Take 1 tablet (5 mg total) by mouth every 4 (four) hours as needed.     traMADol 50 mg tablet  Commonly known as:  ULTRAM  Take 1 tablet (50 mg total) by mouth every 4 (four) hours as needed for Pain.        CONTINUE taking these medications    b complex vitamins tablet  Take 1 tablet by mouth as needed.     HYDROcodone-acetaminophen 5-325 mg per tablet  Commonly known as:  NORCO  Take 1 tablet by mouth every 6 (six) hours as needed for Pain.     losartan 25 MG tablet  Commonly known as:  COZAAR  Take 1 tablet (25 mg total) by mouth once daily.     magnesium 30 mg Tab  Take 500 mg  by mouth once.     mupirocin 2 % ointment  Commonly known as:  BACTROBAN  Apply topically 3 (three) times daily.     ondansetron 8 MG Tbdl  Commonly known as:  ZOFRAN-ODT  Take 1 tablet (8 mg total) by mouth every 8 (eight) hours as needed.     One Daily Multivitamin per tablet  Generic drug:  multivitamin  Take 1 tablet by mouth as needed.     TURMERIC (BULK) MISC  450 mg by Misc.(Non-Drug; Combo Route) route once daily.     VITAMIN D-3 ORAL  Take 1 tablet by mouth once daily.          Discharge Procedure Orders   Diet Adult Regular     Notify your health care provider if you experience any of the following:  redness, tenderness, or signs of infection (pain, swelling, redness, odor or green/yellow discharge around incision site)     Change dressing (specify)   Order Comments: Dressing change: 1 times per day using gauze and ace to begin on 1/30/20.     PT evaluation   Standing Status: Future Standing Exp. Date: 01/28/21   Order Comments: TDWB LLE, ROM as tolerated in brace     Weight bearing restrictions (specify):   Order Comments: TDWB LLE

## 2020-01-29 VITALS
TEMPERATURE: 98 F | HEART RATE: 57 BPM | HEIGHT: 66 IN | RESPIRATION RATE: 15 BRPM | BODY MASS INDEX: 19.29 KG/M2 | OXYGEN SATURATION: 96 % | SYSTOLIC BLOOD PRESSURE: 155 MMHG | DIASTOLIC BLOOD PRESSURE: 76 MMHG | WEIGHT: 120 LBS

## 2020-01-29 NOTE — TELEPHONE ENCOUNTER
Returned call to patient regarding her message in my chart regarding surgery. Patient had surgery 1/28/20 ORIF L Femur. Explained to patient proper way to take her medications as well as how to care for the wound starting 1/30/20. Informed patient I spoke with therapy to get her scheduled for her visit for PT Dalia. They will be calling her today to get it scheduled. All questions addressed and she will call with any other questions or concerns.

## 2020-01-30 ENCOUNTER — PATIENT MESSAGE (OUTPATIENT)
Dept: ORTHOPEDICS | Facility: CLINIC | Age: 70
End: 2020-01-30
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
## 2020-01-31 ENCOUNTER — PATIENT MESSAGE (OUTPATIENT)
Dept: ORTHOPEDICS | Facility: CLINIC | Age: 70
End: 2020-01-31

## 2020-01-31 NOTE — TELEPHONE ENCOUNTER
Called pt back.  Advised to cleanse with chlorhexidine and pat dry with gauze prior to applying new dressing. Advised to use sterile 4x4 gauze to the incisions. Advised not to use any ointment to the area. Advised to re-wrap with ACE to hold gauze in place. Advised can use Tegaderm if needed over gauze to hold in place. Advised that the ACE wrap helps to keep the area from swelling, also acts like compression stockings to help with DVT prevention. Advised getting up to move every 1-2 hours while awake also helps prevent DVT. Pt advised that she needs to have brace on whenever moving. States that the sits in recliner and undoes the brace to apply ice machine. States that she redoes the brace prior to any movement out of the chair. Pt asked about post-op fever. Pt states that she was checked her temp yesterday and it was 99 degrees. Advised to let us know if temp gets to 100.4 or higher.  Advised pt to let us know if she has any further questions or concerns. Also let pt know there is a 24 hour nurse on-call if any concerns over the weekend. Thanks, Melisa

## 2020-02-03 DIAGNOSIS — Z87.81 S/P ORIF (OPEN REDUCTION INTERNAL FIXATION) FRACTURE: ICD-10-CM

## 2020-02-03 DIAGNOSIS — Z98.890 S/P ORIF (OPEN REDUCTION INTERNAL FIXATION) FRACTURE: ICD-10-CM

## 2020-02-03 DIAGNOSIS — S72.445A: Primary | ICD-10-CM

## 2020-02-04 ENCOUNTER — CLINICAL SUPPORT (OUTPATIENT)
Dept: REHABILITATION | Facility: HOSPITAL | Age: 70
End: 2020-02-04
Attending: ORTHOPAEDIC SURGERY
Payer: MEDICARE

## 2020-02-04 DIAGNOSIS — M25.562 ACUTE PAIN OF LEFT KNEE: ICD-10-CM

## 2020-02-04 DIAGNOSIS — S72.445A: ICD-10-CM

## 2020-02-04 DIAGNOSIS — M25.662 DECREASED RANGE OF MOTION OF LEFT KNEE: ICD-10-CM

## 2020-02-04 DIAGNOSIS — R26.9 GAIT DIFFICULTY: ICD-10-CM

## 2020-02-04 PROCEDURE — 97162 PT EVAL MOD COMPLEX 30 MIN: CPT | Mod: PO

## 2020-02-04 PROCEDURE — 97110 THERAPEUTIC EXERCISES: CPT | Mod: PO
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
## 2020-02-05 ENCOUNTER — PATIENT MESSAGE (OUTPATIENT)
Dept: REHABILITATION | Facility: HOSPITAL | Age: 70
End: 2020-02-05

## 2020-02-05 NOTE — PROGRESS NOTES
Physical Therapy Daily Treatment Note     Name: Mindi Agudelo  Clinic Number: [id number]    Therapy Diagnosis:   Encounter Diagnoses   Name Primary?    Gait difficulty     Decreased range of motion of left knee     Acute pain of left knee      Physician: Teodoro Gutiérrez MD    Visit Date: 2/6/2020  Physician Orders: PT Eval and Treat , TDWB LLE, ROM as tolerated in brace   Medical Diagnosis from Referral: Closed nondisplaced fracture of distal epiphysis of left femur, initial encounter   Evaluation Date: 2/4/2020  Authorization Period Expiration: 12/31/2020  Plan of Care Expiration: 05/15/2020  Visit # / Visits authorized: 2/ 20    Time In: 1001  Time Out: 1053  Total Billable Time: 26 minutes    Precautions: Standard, Weightbearing and TTWB LLE, ROM as tolerated in brace    Subjective     Pt reports: She got her compression sock and has been wearing it night and day. She reports no pain with her exercises.  She was compliant with home exercise program.  Response to previous treatment: No change  Functional change: Too soon to tell    Pain: 0/10  Location: left knee      Objective       Knee ROM -2 to 60 (sitting on edge of mat)     Mindi received therapeutic exercises to develop strength, endurance and ROM for 52 minutes including:  Ankle PF/DF YTB 2 x 10  Quad sets 2 x 10 (3 second hold)  SLR 4 x 5 with quad set   Heel slides 2 x 10 with strap and board  Calf stretch x 2 minutes with strap   Long sitting Hamstring stretch 3 x 30s    Tailgating exercise x 5 minutes  PROM to knee x 10 minutes     Home Exercises Provided and Patient Education Provided     Education provided:   - Updated HEP    Written Home Exercises Provided: yes.  Exercises were reviewed and Mindi was able to demonstrate them prior to the end of the session.  Mindi demonstrated good  understanding of the education provided.     See EMR under Media for exercises provided 2/6/2020.    Assessment     Mindi tolerated treatment very well today. Instructed  patient to wear compression sock during the day only. Able to achieve 60 degrees of knee flexion sitting on edge of mat with mild pain. She fatigues quickly with SLRs and needs cues to maintain knee extension. Pt receiving foam pads to place over her incisions to avoid brace irritating her sandy.     Mindi is progressing well towards her goals.   Pt prognosis is Excellent.     Pt will continue to benefit from skilled outpatient physical therapy to address the deficits listed in the problem list box on initial evaluation, provide pt/family education and to maximize pt's level of independence in the home and community environment.     Pt's spiritual, cultural and educational needs considered and pt agreeable to plan of care and goals.    Anticipated barriers to physical therapy: None    Goals:   Short-Term Goals: 6 weeks  - The patient will be independent with initial home exercise program.  - The patient will increase strength to at least 4+/5 to perform functional mobility including improvements with transfers and overall mobility  - The patient will increase (L) knee ROM grossly from 0 degrees to 90 degrees to perform dressing and ADLs with pain < 3/10.     Long-Term Goals: 12 weeks  - Pt to achieve <44% limitation as measured by the FOTO to demonstrate decreased disability.  - The patient will be independent with home exercise program and symptom management.  - The patient will be independent amb with no assistive device on all surfaces for community distances.  - The patient will increase strength to at least 5/5 to perform functional mobility including ambulation, ascending/descending stairs, and return to activity.    - The patient will increase (L) knee ROM to 0 to 125 degrees to perform ambulation with pain < 3/10.    Plan     Continue focus of improving knee ROM and LE strengthening    Bernabe Huerta, PT

## 2020-02-06 ENCOUNTER — CLINICAL SUPPORT (OUTPATIENT)
Dept: REHABILITATION | Facility: HOSPITAL | Age: 70
End: 2020-02-06
Attending: ORTHOPAEDIC SURGERY
Payer: MEDICARE

## 2020-02-06 DIAGNOSIS — M25.562 ACUTE PAIN OF LEFT KNEE: ICD-10-CM

## 2020-02-06 DIAGNOSIS — R26.9 GAIT DIFFICULTY: ICD-10-CM

## 2020-02-06 DIAGNOSIS — M25.662 DECREASED RANGE OF MOTION OF LEFT KNEE: ICD-10-CM

## 2020-02-06 PROCEDURE — 97110 THERAPEUTIC EXERCISES: CPT | Mod: PO

## 2020-02-11 ENCOUNTER — PATIENT MESSAGE (OUTPATIENT)
Dept: ORTHOPEDICS | Facility: CLINIC | Age: 70
End: 2020-02-11

## 2020-02-11 ENCOUNTER — HOSPITAL ENCOUNTER (OUTPATIENT)
Dept: RADIOLOGY | Facility: HOSPITAL | Age: 70
Discharge: HOME | End: 2020-02-11
Attending: ORTHOPAEDIC SURGERY
Payer: MEDICARE

## 2020-02-11 ENCOUNTER — OFFICE VISIT (OUTPATIENT)
Dept: ORTHOPEDICS | Facility: CLINIC | Age: 70
End: 2020-02-11
Payer: MEDICARE

## 2020-02-11 VITALS
SYSTOLIC BLOOD PRESSURE: 157 MMHG | DIASTOLIC BLOOD PRESSURE: 44 MMHG | BODY MASS INDEX: 19.29 KG/M2 | WEIGHT: 120 LBS | HEIGHT: 66 IN | HEART RATE: 80 BPM

## 2020-02-11 DIAGNOSIS — Z98.890 S/P ORIF (OPEN REDUCTION INTERNAL FIXATION) FRACTURE: ICD-10-CM

## 2020-02-11 DIAGNOSIS — S72.445D: Primary | ICD-10-CM

## 2020-02-11 DIAGNOSIS — S72.445A: ICD-10-CM

## 2020-02-11 DIAGNOSIS — Z87.81 S/P ORIF (OPEN REDUCTION INTERNAL FIXATION) FRACTURE: ICD-10-CM

## 2020-02-11 PROCEDURE — 73552 XR FEMUR 2 VIEW LEFT: ICD-10-PCS | Mod: 26,LT,, | Performed by: RADIOLOGY

## 2020-02-11 PROCEDURE — 99999 PR PBB SHADOW E&M-EST. PATIENT-LVL III: CPT | Mod: PBBFAC,,, | Performed by: ORTHOPAEDIC SURGERY

## 2020-02-11 PROCEDURE — 99213 OFFICE O/P EST LOW 20 MIN: CPT | Mod: PBBFAC,25,PN | Performed by: ORTHOPAEDIC SURGERY

## 2020-02-11 PROCEDURE — 99999 PR PBB SHADOW E&M-EST. PATIENT-LVL III: ICD-10-PCS | Mod: PBBFAC,,, | Performed by: ORTHOPAEDIC SURGERY

## 2020-02-11 PROCEDURE — 99024 POSTOP FOLLOW-UP VISIT: CPT | Mod: POP,,, | Performed by: ORTHOPAEDIC SURGERY

## 2020-02-11 PROCEDURE — 99024 PR POST-OP FOLLOW-UP VISIT: ICD-10-PCS | Mod: POP,,, | Performed by: ORTHOPAEDIC SURGERY

## 2020-02-11 PROCEDURE — 73552 X-RAY EXAM OF FEMUR 2/>: CPT | Mod: TC,PO,LT

## 2020-02-11 PROCEDURE — 73552 X-RAY EXAM OF FEMUR 2/>: CPT | Mod: 26,LT,, | Performed by: RADIOLOGY

## 2020-02-11 RX ORDER — METHOCARBAMOL 500 MG/1
500 TABLET, FILM COATED ORAL 4 TIMES DAILY PRN
Qty: 44 TABLET | Refills: 0 | Status: SHIPPED | OUTPATIENT
Start: 2020-02-11 | End: 2020-03-24 | Stop reason: SDUPTHER

## 2020-02-11 NOTE — PROGRESS NOTES
Chief Complaint   Patient presents with    Left Femur - Post-op Evaluation     ORIF L FEMUR 1/20/20       HPI:  69 y.o. returns to clinic today status post  left femur ORIF  2 weeks ago. Pain is mild. Patient is compliant most of the time with restrictions.       left knee: ROM 5-60. Overall normal alignment. Incisions well approximated with staples No erythema or fluctuance. Stable to stress. Skin intact. Compartments soft. NVI distally.     X-rays were performed today, personally reviewed by me and findings discussed with the patient.  2 views of the left femur show hardware intact in good position      Closed nondisplaced fracture of distal epiphysis of left femur with routine healing, subsequent encounter    S/P ORIF (open reduction internal fixation) fracture        Staples removed, steristrips applied. PT referral, continue aggressive ROM. RTC 6 weeks with repeat Xrays.

## 2020-02-12 ENCOUNTER — CLINICAL SUPPORT (OUTPATIENT)
Dept: REHABILITATION | Facility: HOSPITAL | Age: 70
End: 2020-02-12
Attending: ORTHOPAEDIC SURGERY
Payer: MEDICARE

## 2020-02-12 DIAGNOSIS — M25.662 DECREASED RANGE OF MOTION OF LEFT KNEE: ICD-10-CM

## 2020-02-12 DIAGNOSIS — R26.9 GAIT DIFFICULTY: ICD-10-CM

## 2020-02-12 DIAGNOSIS — M25.562 ACUTE PAIN OF LEFT KNEE: ICD-10-CM

## 2020-02-12 PROCEDURE — 97110 THERAPEUTIC EXERCISES: CPT | Mod: PO,CQ

## 2020-02-12 NOTE — PROGRESS NOTES
Physical Therapy Daily Treatment Note     Name: Mindi Agudelo  Clinic Number: [id number]    Therapy Diagnosis:   Encounter Diagnoses   Name Primary?    Gait difficulty     Decreased range of motion of left knee     Acute pain of left knee      Physician: Teodoro Gutiérrez MD    Visit Date: 2/12/2020  Physician Orders: PT Eval and Treat , TDWB LLE, ROM as tolerated in brace   Medical Diagnosis from Referral: Closed nondisplaced fracture of distal epiphysis of left femur, initial encounter   Evaluation Date: 2/4/2020  Authorization Period Expiration: 12/31/2020  Plan of Care Expiration: 05/15/2020  Visit # / Visits authorized: 3/ 20    Time In: 1000 AM  Time Out: 1055 AM  Total Billable Time: 55 minutes    Precautions: Standard, Weightbearing and TTWB LLE, Aggresive ROM per MD note 2/11/2020    Subjective     Pt reports: her knee is feeling okay today. Patient states she does feel a twinge from time to time but this resolves quickly. Patient states she did not take a muscle relaxer prior to today's session. She was compliant with home exercise program.  Response to previous treatment: No change  Functional change: Too soon to tell    Pain: 0/10  Location: left knee                                                                                                                                                           Objective     Knee ROM -2 to 68 (sitting on edge of mat)     Mindi received therapeutic exercises to develop strength, endurance and ROM for 55 minutes including:  PROM to knee x 10 minutes   Ankle PF/DF YTB 2 x 10  Quad sets 2 x 10 (3 second hold)  SLR 4 x 5 with quad set   Calf stretch x 2 minutes with strap   Long sitting Hamstring stretch 3 x 30s      Static bend over large bolster x 3 minutes  Tailgating exercise x 5 minutes  Heel slides x 5 minute with strap and board    Home Exercises Provided and Patient Education Provided     Education provided:   - Updated HEP    Written Home Exercises Provided:  Patient instructed to cont prior HEP.  Exercises were reviewed and Mindi was able to demonstrate them prior to the end of the session.  Mindi demonstrated good  understanding of the education provided.     See EMR under Media for exercises provided 2/6/2020.    Assessment     Mindi demonstrates quad lag with SLR due to weakness as she has trouble maintaining quad activation through motion. Patient demonstrates increased apprehension in regards to knee flexion requiring heavy cues to achieve muscle relaxation. Patient did respond positively to passive knee flexion seated on edge of mat with improvements in ROM achieved. Patient's knee extension ROM is normalizing without difficulty.     Mindi is progressing well towards her goals.   Pt prognosis is Excellent.     Pt will continue to benefit from skilled outpatient physical therapy to address the deficits listed in the problem list box on initial evaluation, provide pt/family education and to maximize pt's level of independence in the home and community environment.     Pt's spiritual, cultural and educational needs considered and pt agreeable to plan of care and goals.    Anticipated barriers to physical therapy: None    Goals:   Short-Term Goals: 6 weeks  - The patient will be independent with initial home exercise program.  - The patient will increase strength to at least 4+/5 to perform functional mobility including improvements with transfers and overall mobility  - The patient will increase (L) knee ROM grossly from 0 degrees to 90 degrees to perform dressing and ADLs with pain < 3/10.     Long-Term Goals: 12 weeks  - Pt to achieve <44% limitation as measured by the FOTO to demonstrate decreased disability.  - The patient will be independent with home exercise program and symptom management.  - The patient will be independent amb with no assistive device on all surfaces for community distances.  - The patient will increase strength to at least 5/5 to perform  functional mobility including ambulation, ascending/descending stairs, and return to activity.    - The patient will increase (L) knee ROM to 0 to 125 degrees to perform ambulation with pain < 3/10.    Plan     Continue focus of improving knee ROM and LE strengthening    Samia Hardwick, PTA

## 2020-02-14 ENCOUNTER — CLINICAL SUPPORT (OUTPATIENT)
Dept: REHABILITATION | Facility: HOSPITAL | Age: 70
End: 2020-02-14
Attending: INTERNAL MEDICINE
Payer: MEDICARE

## 2020-02-14 DIAGNOSIS — R26.9 GAIT DIFFICULTY: ICD-10-CM

## 2020-02-14 DIAGNOSIS — M25.562 ACUTE PAIN OF LEFT KNEE: ICD-10-CM

## 2020-02-14 DIAGNOSIS — M25.662 DECREASED RANGE OF MOTION OF LEFT KNEE: ICD-10-CM

## 2020-02-14 PROCEDURE — 97110 THERAPEUTIC EXERCISES: CPT | Mod: PO,CQ

## 2020-02-14 NOTE — PROGRESS NOTES
Physical Therapy Daily Treatment Note     Name: Mindi Agudelo  Clinic Number: [id number]    Therapy Diagnosis:   Encounter Diagnoses   Name Primary?    Gait difficulty     Decreased range of motion of left knee     Acute pain of left knee      Physician: Teodoro Gutiérrez MD    Visit Date: 2/14/2020  Physician Orders: PT Eval and Treat , TDWB LLE, ROM as tolerated in brace   Medical Diagnosis from Referral: Closed nondisplaced fracture of distal epiphysis of left femur, initial encounter   Evaluation Date: 2/4/2020  Authorization Period Expiration: 12/31/2020  Plan of Care Expiration: 05/15/2020  Visit # / Visits authorized: 4/ 20    Time In: 1201  Time Out: 1255  Total Billable Time: 55 minutes    Precautions: Standard, Weightbearing and TTWB LLE, Aggresive ROM per MD note 2/11/2020    Subjective     Pt reports: she had soreness on the inside of her knee following last treatment. Patient states she has been unlocking her brace when seated. Patient states her knee is stiff when she bends and if she keeps pushing this eventually leads to pain. Patient states she still refuses to take her muscle relaxer.  She was compliant with home exercise program.  Response to previous treatment: No change  Functional change: Too soon to tell    Pain: 0/10  Location: left knee                                                                                                                                                           Objective     Knee ROM 0 to 70 (sitting on edge of mat)     Mindi received therapeutic exercises to develop strength, endurance and ROM for 55 minutes including:  PROM to knee x 10 minutes   Supine hip flexor stretch off edge of mat: PTA assist due to lack of ROM 30 sec x 3   Ankle PF/DF YTB 2 x 10  Quad sets 2 x 10 (3 second hold)  SLR 4 x 5 with quad set   Calf stretch x 2 minutes with strap   Long sitting Hamstring stretch 3 x 30s      Static bend over large bolster x 3 minutes  Tailgating exercise x 5  minutes   Heel slides x 5 minute with sheet and board    Home Exercises Provided and Patient Education Provided     Education provided:   - Updated HEP    Written Home Exercises Provided: Patient instructed to cont prior HEP.  Exercises were reviewed and Mindi was able to demonstrate them prior to the end of the session.  Mindi demonstrated good  understanding of the education provided.     See EMR under Media for exercises provided 2/6/2020.    Assessment     Mindi point tender with palpation to MCL/medial knee and this is also where majority of patient's pain is with knee flexion. Empty end feel with marked pain before reaching end range of knee flexion. Patient's knee extension ROM and quad activation are normalizing without difficulty.     Mindi is progressing well towards her goals.   Pt prognosis is Excellent.     Pt will continue to benefit from skilled outpatient physical therapy to address the deficits listed in the problem list box on initial evaluation, provide pt/family education and to maximize pt's level of independence in the home and community environment.     Pt's spiritual, cultural and educational needs considered and pt agreeable to plan of care and goals.    Anticipated barriers to physical therapy: None    Goals:   Short-Term Goals: 6 weeks  - The patient will be independent with initial home exercise program.  - The patient will increase strength to at least 4+/5 to perform functional mobility including improvements with transfers and overall mobility  - The patient will increase (L) knee ROM grossly from 0 degrees to 90 degrees to perform dressing and ADLs with pain < 3/10.     Long-Term Goals: 12 weeks  - Pt to achieve <44% limitation as measured by the FOTO to demonstrate decreased disability.  - The patient will be independent with home exercise program and symptom management.  - The patient will be independent amb with no assistive device on all surfaces for community distances.  - The  patient will increase strength to at least 5/5 to perform functional mobility including ambulation, ascending/descending stairs, and return to activity.    - The patient will increase (L) knee ROM to 0 to 125 degrees to perform ambulation with pain < 3/10.    Plan     Continue focus of improving knee ROM and LE strengthening    Samia Hardwick, PTA

## 2020-02-19 ENCOUNTER — CLINICAL SUPPORT (OUTPATIENT)
Dept: REHABILITATION | Facility: HOSPITAL | Age: 70
End: 2020-02-19
Attending: ORTHOPAEDIC SURGERY
Payer: MEDICARE

## 2020-02-19 ENCOUNTER — LAB VISIT (OUTPATIENT)
Dept: LAB | Facility: HOSPITAL | Age: 70
End: 2020-02-19
Attending: INTERNAL MEDICINE
Payer: MEDICARE

## 2020-02-19 DIAGNOSIS — R26.9 GAIT DIFFICULTY: ICD-10-CM

## 2020-02-19 DIAGNOSIS — E04.1 THYROID NODULE: ICD-10-CM

## 2020-02-19 DIAGNOSIS — M25.662 DECREASED RANGE OF MOTION OF LEFT KNEE: ICD-10-CM

## 2020-02-19 DIAGNOSIS — M25.562 ACUTE PAIN OF LEFT KNEE: ICD-10-CM

## 2020-02-19 LAB — TSH SERPL DL<=0.005 MIU/L-ACNC: 2.63 UIU/ML (ref 0.4–4)

## 2020-02-19 PROCEDURE — 97110 THERAPEUTIC EXERCISES: CPT | Mod: PO,CQ

## 2020-02-19 PROCEDURE — 84443 ASSAY THYROID STIM HORMONE: CPT

## 2020-02-19 PROCEDURE — 36415 COLL VENOUS BLD VENIPUNCTURE: CPT | Mod: PO

## 2020-02-19 NOTE — PROGRESS NOTES
Physical Therapy Daily Treatment Note     Name: Mindi Agudelo  Clinic Number: [id number]    Therapy Diagnosis:   Encounter Diagnoses   Name Primary?    Closed nondisplaced fracture of distal epiphysis of left femur with routine healing, subsequent encounter     S/P ORIF (open reduction internal fixation) fracture     Gait difficulty     Decreased range of motion of left knee     Acute pain of left knee      Physician: Teodoro Gutiérrez MD    Visit Date: 2/21/2020  Physician Orders: PT Eval and Treat , TDWB LLE, ROM as tolerated in brace   Medical Diagnosis from Referral: Closed nondisplaced fracture of distal epiphysis of left femur, initial encounter   Evaluation Date: 2/4/2020  Authorization Period Expiration: 12/31/2020  Plan of Care Expiration: 05/15/2020  Visit # / Visits authorized: 6/ 20    Time In: 1000 AM  Time Out: 1101 AM  Total Billable Time: 60 minutes    Precautions: Standard, Weightbearing and TTWB LLE, Aggresive ROM per MD note 2/11/2020    Subjective     Pt reports: she has been working on bending her leg. She reports she has been working on using her quad and not using her glutes to achieve knee extension.  She was compliant with home exercise program.  Response to previous treatment: No change  Functional change: limited post operative weightbearing restrictions     Pain: 0/10  Location: left knee                                                                                                                                                           Objective     Knee PROM 0 to 75 (sitting on edge of mat)     Mindi received therapeutic exercises to develop strength, endurance and ROM for 60 minutes including:  PROM to knee x 10 minutes with contract relax technique   Supine hip flexor stretch off edge of mat: PTA assist due to lack of ROM 30 sec x 3   Ankle PF/DF GTB 2 x 10  Quad sets x 5 minutes  SLR 2 x10  with quad set    SL hip abduction 2 x 10   Prone hip extension 2 x 10   Calf stretch with  strap x 30 sec x 5   Long sitting hamstring stretch 3 x 30s      SAQ over large bolster x 3 minutes, 5 sec hold in extension)   SAQ x 10 1#  Tailgating exercise x 5 minutes   Heel slides x 5 minute with sheet and board  LAQs 2 x 10  HS curls RTB 2 x 10     Home Exercises Provided and Patient Education Provided     Education provided:   - Updated HEP    Written Home Exercises Provided: Patient instructed to cont prior HEP.  Exercises were reviewed and Mindi was able to demonstrate them prior to the end of the session.  Mindi demonstrated good  understanding of the education provided.     See EMR under Media for exercises provided 2/6/2020.    Assessment     Mindi martinez improved quad recruitment for leg raise and SAQ. She was able to progress to SL and prone leg raises with no complaints of pain. She is continuing to progress with knee flexion, but remains limited by lateral knee pain with flexion. Will continue to progress with knee ROM as tolerated.     Mindi is progressing well towards her goals.   Pt prognosis is Excellent.     Pt will continue to benefit from skilled outpatient physical therapy to address the deficits listed in the problem list box on initial evaluation, provide pt/family education and to maximize pt's level of independence in the home and community environment.     Pt's spiritual, cultural and educational needs considered and pt agreeable to plan of care and goals.    Anticipated barriers to physical therapy: None    Goals:   Short-Term Goals: 6 weeks  - The patient will be independent with initial home exercise program.  - The patient will increase strength to at least 4+/5 to perform functional mobility including improvements with transfers and overall mobility  - The patient will increase (L) knee ROM grossly from 0 degrees to 90 degrees to perform dressing and ADLs with pain < 3/10.     Long-Term Goals: 12 weeks  - Pt to achieve <44% limitation as measured by the FOTO to demonstrate decreased  disability.  - The patient will be independent with home exercise program and symptom management.  - The patient will be independent amb with no assistive device on all surfaces for community distances.  - The patient will increase strength to at least 5/5 to perform functional mobility including ambulation, ascending/descending stairs, and return to activity.    - The patient will increase (L) knee ROM to 0 to 125 degrees to perform ambulation with pain < 3/10.    Plan     Continue focus of improving knee ROM and LE strengthening    Bernabe Huerta, PT

## 2020-02-19 NOTE — PROGRESS NOTES
Physical Therapy Daily Treatment Note     Name: Mindi Agudelo  Clinic Number: [id number]    Therapy Diagnosis:   Encounter Diagnoses   Name Primary?    Gait difficulty     Decreased range of motion of left knee     Acute pain of left knee      Physician: Teodoro Gutiérrez MD    Visit Date: 2/19/2020  Physician Orders: PT Eval and Treat , TDWB LLE, ROM as tolerated in brace   Medical Diagnosis from Referral: Closed nondisplaced fracture of distal epiphysis of left femur, initial encounter   Evaluation Date: 2/4/2020  Authorization Period Expiration: 12/31/2020  Plan of Care Expiration: 05/15/2020  Visit # / Visits authorized: 5/ 20    Time In: 1000 AM  Time Out: 1058 AM  Total Billable Time: 45 minutes    Precautions: Standard, Weightbearing and TTWB LLE, Aggresive ROM per MD note 2/11/2020    Subjective     Pt reports: she still refuses to take a muscle relaxer. Patient reports soreness along medial knee following last treatment session. She was compliant with home exercise program.  Response to previous treatment: No change  Functional change: limited post operative weightbearing restrictions     Pain: 0/10  Location: left knee                                                                                                                                                           Objective     Knee PROM 0 to 75 (sitting on edge of mat)     Mindi received therapeutic exercises to develop strength, endurance and ROM for 55 minutes including:  PROM to knee x 10 minutes   Supine hip flexor stretch off edge of mat: PTA assist due to lack of ROM 30 sec x 3   Ankle PF/DF YTB 2 x 10  Quad sets x 5 minutes (in conjunction with patient's home NMES unit)  SLR 4 x 5 with quad set   Calf stretch with strap x 30 sec x 5   Long sitting hamstring stretch 3 x 30s      SAQ over large bolster x 3 minutes, 5 sec hold in extension)   Tailgating exercise x 5 minutes   Heel slides x 5 minute with sheet and board    Home Exercises Provided  and Patient Education Provided     Education provided:   - Updated HEP    Written Home Exercises Provided: Patient instructed to cont prior HEP.  Exercises were reviewed and Mindi was able to demonstrate them prior to the end of the session.  Mindi demonstrated good  understanding of the education provided.     See EMR under Media for exercises provided 2/6/2020.    Assessment     Mindi demonstrated poor quad recruitment this date often compensating with contralateral heel dig and glutes. Patient did respond positively to visual cueing from contralateral quad set as well as electrical stimulation with improvements in isolated activation achieved. Patient also with improved tolerance to passive knee flexion with less guarding and apprehension. Pain continues to limit ROM progression but this is slowly and consistently improving.    Mindi is progressing well towards her goals.   Pt prognosis is Excellent.     Pt will continue to benefit from skilled outpatient physical therapy to address the deficits listed in the problem list box on initial evaluation, provide pt/family education and to maximize pt's level of independence in the home and community environment.     Pt's spiritual, cultural and educational needs considered and pt agreeable to plan of care and goals.    Anticipated barriers to physical therapy: None    Goals:   Short-Term Goals: 6 weeks  - The patient will be independent with initial home exercise program.  - The patient will increase strength to at least 4+/5 to perform functional mobility including improvements with transfers and overall mobility  - The patient will increase (L) knee ROM grossly from 0 degrees to 90 degrees to perform dressing and ADLs with pain < 3/10.     Long-Term Goals: 12 weeks  - Pt to achieve <44% limitation as measured by the FOTO to demonstrate decreased disability.  - The patient will be independent with home exercise program and symptom management.  - The patient will be  independent amb with no assistive device on all surfaces for community distances.  - The patient will increase strength to at least 5/5 to perform functional mobility including ambulation, ascending/descending stairs, and return to activity.    - The patient will increase (L) knee ROM to 0 to 125 degrees to perform ambulation with pain < 3/10.    Plan     Continue focus of improving knee ROM and LE strengthening    Samia Hardwick, PTA

## 2020-02-20 ENCOUNTER — PATIENT MESSAGE (OUTPATIENT)
Dept: ENDOCRINOLOGY | Facility: CLINIC | Age: 70
End: 2020-02-20

## 2020-02-21 ENCOUNTER — CLINICAL SUPPORT (OUTPATIENT)
Dept: REHABILITATION | Facility: HOSPITAL | Age: 70
End: 2020-02-21
Attending: ORTHOPAEDIC SURGERY
Payer: MEDICARE

## 2020-02-21 DIAGNOSIS — R26.9 GAIT DIFFICULTY: ICD-10-CM

## 2020-02-21 DIAGNOSIS — S72.445D: ICD-10-CM

## 2020-02-21 DIAGNOSIS — M25.662 DECREASED RANGE OF MOTION OF LEFT KNEE: ICD-10-CM

## 2020-02-21 DIAGNOSIS — Z87.81 S/P ORIF (OPEN REDUCTION INTERNAL FIXATION) FRACTURE: ICD-10-CM

## 2020-02-21 DIAGNOSIS — M25.562 ACUTE PAIN OF LEFT KNEE: ICD-10-CM

## 2020-02-21 DIAGNOSIS — Z98.890 S/P ORIF (OPEN REDUCTION INTERNAL FIXATION) FRACTURE: ICD-10-CM

## 2020-02-21 PROCEDURE — 97110 THERAPEUTIC EXERCISES: CPT | Mod: PO

## 2020-02-21 NOTE — PROGRESS NOTES
Physical Therapy Daily Treatment Note     Name: Mindi Agudelo  Clinic Number: [id number]    Therapy Diagnosis:   Encounter Diagnoses   Name Primary?    Gait difficulty     Decreased range of motion of left knee     Acute pain of left knee      Physician: Teodoro Gutiérrez MD    Visit Date: 2/24/2020  Physician Orders: PT Eval and Treat , TDWB LLE, ROM as tolerated in brace   Medical Diagnosis from Referral: Closed nondisplaced fracture of distal epiphysis of left femur, initial encounter   Evaluation Date: 2/4/2020  Authorization Period Expiration: 12/31/2020  Plan of Care Expiration: 05/15/2020  Visit # / Visits authorized: 7/ 20    Time In: 1201   Time Out: 1300   Total Billable Time: 28 minutes    Precautions: Standard, Weightbearing and TTWB LLE, Aggresive ROM per MD note 2/11/2020    Subjective     Pt reports: The new exercises were difficult. She reports she is still having soreness along her medial and lateral joint line with knee flexion.   She was compliant with home exercise program.  Response to previous treatment: No change  Functional change: limited post operative weightbearing restrictions     Pain: 0/10  Location: left knee                                                                                                                                                           Objective     Knee PROM 0 to 75 (sitting on edge of mat)     Mindi received therapeutic exercises to develop strength, endurance and ROM for 59 minutes including:  PROM to knee x 5 minutes with contract relax technique   Supine hip flexor stretch off edge of mat: PT assist due to  lack of ROM 30 sec x 3   Ankle PF/DF GTB 2 x 10  Quad sets x 5 minutes  SLR 2 x10  with quad set    SL hip abduction 2 x 10   Prone hip extension 2 x 10   Calf stretch with strap x 30 sec x 5   Long sitting hamstring stretch 3 x 30s    Prone hip flexor stretch x 1 min with towel under the knee     SAQ over large bolster x 3 minutes, 5 sec hold in  extension)  1#  Tailgating exercise x 5 minutes - NP  Heel slides x 5 minute with sheet and board  LAQs 2 x 10 1#  HS curls RTB 2 x 10   DKTC on green swiss ball x 2 min - for knee flexion     Home Exercises Provided and Patient Education Provided     Education provided:   - Updated HEP    Written Home Exercises Provided: Patient instructed to cont prior HEP.  Exercises were reviewed and Mindi was able to demonstrate them prior to the end of the session.  Mindi demonstrated good  understanding of the education provided.     See EMR under Media for exercises provided 2/6/2020.    Assessment     Mindi had increased knee pain with SL hip abduction today. Continues to be significantly limited with her knee flexion ROM due to joint line pain. Progressing with quad control and SLR. Will try recumbent bike next visit for knee flexion ROM.      Mindi is progressing well towards her goals.   Pt prognosis is Excellent.     Pt will continue to benefit from skilled outpatient physical therapy to address the deficits listed in the problem list box on initial evaluation, provide pt/family education and to maximize pt's level of independence in the home and community environment.     Pt's spiritual, cultural and educational needs considered and pt agreeable to plan of care and goals.    Anticipated barriers to physical therapy: None    Goals:   Short-Term Goals: 6 weeks  - The patient will be independent with initial home exercise program.  - The patient will increase strength to at least 4+/5 to perform functional mobility including improvements with transfers and overall mobility  - The patient will increase (L) knee ROM grossly from 0 degrees to 90 degrees to perform dressing and ADLs with pain < 3/10.     Long-Term Goals: 12 weeks  - Pt to achieve <44% limitation as measured by the FOTO to demonstrate decreased disability.  - The patient will be independent with home exercise program and symptom management.  - The patient will be  independent amb with no assistive device on all surfaces for community distances.  - The patient will increase strength to at least 5/5 to perform functional mobility including ambulation, ascending/descending stairs, and return to activity.    - The patient will increase (L) knee ROM to 0 to 125 degrees to perform ambulation with pain < 3/10.    Plan     Continue focus of improving knee ROM and LE strengthening    Bernabe Huerta, PT

## 2020-02-24 ENCOUNTER — CLINICAL SUPPORT (OUTPATIENT)
Dept: REHABILITATION | Facility: HOSPITAL | Age: 70
End: 2020-02-24
Attending: ORTHOPAEDIC SURGERY
Payer: MEDICARE

## 2020-02-24 DIAGNOSIS — M25.662 DECREASED RANGE OF MOTION OF LEFT KNEE: ICD-10-CM

## 2020-02-24 DIAGNOSIS — R26.9 GAIT DIFFICULTY: ICD-10-CM

## 2020-02-24 DIAGNOSIS — M25.562 ACUTE PAIN OF LEFT KNEE: ICD-10-CM

## 2020-02-24 PROCEDURE — 97110 THERAPEUTIC EXERCISES: CPT | Mod: PO

## 2020-02-24 NOTE — PROGRESS NOTES
Physical Therapy Daily Treatment Note     Name: Mindi Agudelo  Clinic Number: [id number]    Therapy Diagnosis:   Encounter Diagnoses   Name Primary?    Gait difficulty     Decreased range of motion of left knee     Acute pain of left knee      Physician: Teodoro Gutiérrez MD    Visit Date: 2/27/2020  Physician Orders: PT Eval and Treat , TDWB LLE, ROM as tolerated in brace   Medical Diagnosis from Referral: Closed nondisplaced fracture of distal epiphysis of left femur, initial encounter   Evaluation Date: 2/4/2020  Authorization Period Expiration: 12/31/2020  Plan of Care Expiration: 05/15/2020  Visit # / Visits authorized: 8/ 20    Time In: 1200  Time Out: 1300  Total Billable Time: 45minutes    Precautions: Standard, Weightbearing and TTWB LLE, Aggresive ROM per MD note 2/11/2020    Subjective     Pt reports: She took an advil and tylenol due to pain today prior to her treatment this morning in anticipation of pain with knee flexion. She reports she is beginning to feel discouraged due to lack of progress.  She was compliant with home exercise program.  Response to previous treatment: No change  Functional change: limited post operative weightbearing restrictions     Pain: 0/10  Location: left knee                                                                                                                                                           Objective     Knee PROM 0 to 75 (sitting on edge of mat)     Mindi received therapeutic exercises to develop strength, endurance and ROM for 55 minutes including:  Recumbent bike x 10 minutes - partial revolutions  Supine hip flexor stretch off edge of mat: PT assist due to  lack of ROM 30 sec x 3   Ankle PF/DF GTB 2 x 10  Quad sets x 5 minutes  SLR 2 x10  with quad set    SL hip abduction 2 x 10  - NP  Prone hip extension 2 x 10 - NP  Calf stretch with strap x 30 sec x 5   Long sitting hamstring stretch 3 x 30s    Prone hip flexor stretch x 1 min with towel under the  knee  - NP    SAQ over large bolster x 3 minutes, 5 sec hold in extension)  1#  Tailgating exercise x 5 minutes - NP  Heel slides x 5 minute with sheet and board  LAQs 2 x 10 1#  HS curls GTB 2 x 10    Mindi received the following manual therapy techniques: Soft tissue Mobilization were applied to the: (L) knee for 5 minutes, including:  IASTM to (L) quad while hip flexor stretch position    Home Exercises Provided and Patient Education Provided     Education provided:   - Updated HEP    Written Home Exercises Provided: Patient instructed to cont prior HEP.  Exercises were reviewed and Mindi was able to demonstrate them prior to the end of the session.  Mindi demonstrated good  understanding of the education provided.     See EMR under Media for exercises provided 2/6/2020.    Assessment     Mindi continues to improve with quad control with SAQ and LAQs. She tolerated partial revolutions on the recumbent bike well with no exacerbations of her pain levels. Appropriate erythema noted at quad after IASTM. Able to achieve ~80 degrees knee flexion EOM today. Instructed patient to perform two 10-minute sessions on her recumbent bike every day.   Mindi is progressing well towards her goals.   Pt prognosis is Excellent.     Pt will continue to benefit from skilled outpatient physical therapy to address the deficits listed in the problem list box on initial evaluation, provide pt/family education and to maximize pt's level of independence in the home and community environment.     Pt's spiritual, cultural and educational needs considered and pt agreeable to plan of care and goals.    Anticipated barriers to physical therapy: None    Goals:   Short-Term Goals: 6 weeks  - The patient will be independent with initial home exercise program.  - The patient will increase strength to at least 4+/5 to perform functional mobility including improvements with transfers and overall mobility  - The patient will increase (L) knee ROM grossly  from 0 degrees to 90 degrees to perform dressing and ADLs with pain < 3/10.     Long-Term Goals: 12 weeks  - Pt to achieve <44% limitation as measured by the FOTO to demonstrate decreased disability.  - The patient will be independent with home exercise program and symptom management.  - The patient will be independent amb with no assistive device on all surfaces for community distances.  - The patient will increase strength to at least 5/5 to perform functional mobility including ambulation, ascending/descending stairs, and return to activity.    - The patient will increase (L) knee ROM to 0 to 125 degrees to perform ambulation with pain < 3/10.    Plan     Continue focus of improving knee ROM and LE strengthening    Bernabe Huerta, PT

## 2020-02-27 ENCOUNTER — CLINICAL SUPPORT (OUTPATIENT)
Dept: REHABILITATION | Facility: HOSPITAL | Age: 70
End: 2020-02-27
Attending: ORTHOPAEDIC SURGERY
Payer: MEDICARE

## 2020-02-27 ENCOUNTER — PATIENT OUTREACH (OUTPATIENT)
Dept: OTHER | Facility: OTHER | Age: 70
End: 2020-02-27

## 2020-02-27 DIAGNOSIS — M25.562 ACUTE PAIN OF LEFT KNEE: ICD-10-CM

## 2020-02-27 DIAGNOSIS — M25.662 DECREASED RANGE OF MOTION OF LEFT KNEE: ICD-10-CM

## 2020-02-27 DIAGNOSIS — R26.9 GAIT DIFFICULTY: ICD-10-CM

## 2020-02-27 PROCEDURE — 97110 THERAPEUTIC EXERCISES: CPT | Mod: PO
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
## 2020-03-03 ENCOUNTER — PATIENT MESSAGE (OUTPATIENT)
Dept: ORTHOPEDICS | Facility: CLINIC | Age: 70
End: 2020-03-03

## 2020-03-03 ENCOUNTER — CLINICAL SUPPORT (OUTPATIENT)
Dept: REHABILITATION | Facility: HOSPITAL | Age: 70
End: 2020-03-03
Attending: ORTHOPAEDIC SURGERY
Payer: MEDICARE

## 2020-03-03 DIAGNOSIS — M25.562 ACUTE PAIN OF LEFT KNEE: ICD-10-CM

## 2020-03-03 DIAGNOSIS — R26.9 GAIT DIFFICULTY: ICD-10-CM

## 2020-03-03 DIAGNOSIS — M25.662 DECREASED RANGE OF MOTION OF LEFT KNEE: ICD-10-CM

## 2020-03-03 PROCEDURE — 97110 THERAPEUTIC EXERCISES: CPT | Mod: PO,CQ

## 2020-03-03 PROCEDURE — 97140 MANUAL THERAPY 1/> REGIONS: CPT | Mod: PO,CQ

## 2020-03-03 NOTE — PROGRESS NOTES
Physical Therapy Daily Treatment Note     Name: Mindi Agudelo  Clinic Number: [id number]    Therapy Diagnosis:   Encounter Diagnoses   Name Primary?    Gait difficulty     Decreased range of motion of left knee     Acute pain of left knee      Physician: Teodoro Gutiérrez MD    Visit Date: 3/5/2020  Physician Orders: PT Eval and Treat , TDWB LLE, ROM as tolerated in brace   Medical Diagnosis from Referral: Closed nondisplaced fracture of distal epiphysis of left femur, initial encounter   Evaluation Date: 2/4/2020  Authorization Period Expiration: 12/31/2020  Plan of Care Expiration: 05/15/2020  Visit # / Visits authorized: 10/ 20    Time In: 1000 AM  Time Out: 1115 AM  Total Billable Time: 45 minutes    Precautions: Standard, Weightbearing and TTWB LLE, Aggresive ROM per MD note 2/11/2020    Subjective     Pt reports: She has been performing her HEP and took two advil this morning prior to PT. She was compliant with home exercise program.  Response to previous treatment: No change  Functional change: limited post operative weightbearing restrictions     Pain: 0/10  Location: left knee                                                                                                                                                           Objective     Knee PROM 0 to 78 (sitting on edge of mat)     Mindi received therapeutic exercises to develop strength, endurance and ROM for 75 minutes including:  Recumbent bike x 10 minutes - partial revolutions  SL hip flexor stretch off edge of mat: PT x 5 minutes  Standing hamstring curls x 10 - NP    Ankle PF/DF GTB 2 x 10 (not performed today)  Quad sets 15x 10''   SLR 2 x10 with quad set    SL hip abduction 2 x 10   Prone hip extension 2 x 10 (not performed today)  Calf stretch with strap x 30 sec x 5 - NP  Long sitting hamstring stretch 3 x 30s  - NP    SAQ over large bolster x 3 minutes, 5 sec hold in extension)   Heel slides x 5 minute with sheet and board - NP  LAQs 3 x 10  (3 second hold)  Precor HS curls 20# x 5 minutes    Mindi received the following manual therapy techniques: Soft tissue Mobilization were applied to the: (L) knee for 00 minutes, including:  STM to (L) quad: seated EOM, followed by knee flexion stretch - NP     Home Exercises Provided and Patient Education Provided     Education provided:   - Updated HEP  - Bending 1x per hour     Written Home Exercises Provided: Patient instructed to cont prior HEP.  Exercises were reviewed and Mindi was able to demonstrate them prior to the end of the session.  Mindi demonstrated good  understanding of the education provided.     See EMR under Media for exercises provided 2/6/2020.    Assessment     Mindi presented with limited knee extension ROM today. She has been focusing on knee flexion aggressively at home and is now limited with knee extension. She was able to achieve knee extension by the end of treatment, but still has difficulty with quad activation. Issued passive knee extension stretch for patient to perform at home.  Goals updated to reflect.     Mindi is progressing well towards her goals.   Pt prognosis is Excellent.     Pt will continue to benefit from skilled outpatient physical therapy to address the deficits listed in the problem list box on initial evaluation, provide pt/family education and to maximize pt's level of independence in the home and community environment.     Pt's spiritual, cultural and educational needs considered and pt agreeable to plan of care and goals.    Anticipated barriers to physical therapy: None    Goals:   Short-Term Goals: 6 weeks  - The patient will be independent with initial home exercise program. (Met)  - The patient will increase strength to at least 4+/5 to perform functional mobility including improvements with transfers and overall mobility. (progressing, not met)  - The patient will increase (L) knee ROM grossly from 0 degrees to 90 degrees to perform dressing and ADLs with pain <  3/10. (Progressing, not met)    Long-Term Goals: 12 weeks  - Pt to achieve <44% limitation as measured by the FOTO to demonstrate decreased disability. (Progressing, not met)  - The patient will be independent with home exercise program and symptom management.  (Progressing, not met)  - The patient will be independent amb with no assistive device on all surfaces for community distances.  (Progressing, not met)  - The patient will increase strength to at least 5/5 to perform functional mobility including ambulation, ascending/descending stairs, and return to activity. (Progressing, not met)  - The patient will increase (L) knee ROM to 0 to 125 degrees to perform ambulation with pain < 3/10.  (Progressing, not met)    Plan     Continue focus of improving knee ROM and LE strengthening    Bernabe Huerta, PT

## 2020-03-03 NOTE — PROGRESS NOTES
"  Physical Therapy Daily Treatment Note     Name: Mindi Agudelo  Clinic Number: [id number]    Therapy Diagnosis:   Encounter Diagnoses   Name Primary?    Gait difficulty     Decreased range of motion of left knee     Acute pain of left knee      Physician: Teodoro Gutiérrez MD    Visit Date: 3/3/2020  Physician Orders: PT Eval and Treat , TDWB LLE, ROM as tolerated in brace   Medical Diagnosis from Referral: Closed nondisplaced fracture of distal epiphysis of left femur, initial encounter   Evaluation Date: 2/4/2020  Authorization Period Expiration: 12/31/2020  Plan of Care Expiration: 05/15/2020  Visit # / Visits authorized: 9/ 20    Time In: 0958 AM  Time Out: 1100 AM  Total Billable Time: 55 minutes    Precautions: Standard, Weightbearing and TTWB LLE, Aggresive ROM per MD note 2/11/2020    Subjective     Pt reports: she has been riding her bike at home and bending her knee with the "knee glider." Patient states she took a muscle relaxer prior to today's treatment session. She was compliant with home exercise program.  Response to previous treatment: No change  Functional change: limited post operative weightbearing restrictions     Pain: 0/10  Location: left knee                                                                                                                                                           Objective     Knee PROM 0 to 78 (sitting on edge of mat)     Mindi received therapeutic exercises to develop strength, endurance and ROM for 45 minutes including:  Recumbent bike x 10 minutes - partial revolutions  Supine hip flexor stretch off edge of mat: PTA assist due to  lack of ROM x 1 minute x 4   Standing hamstring curls x 10     Ankle PF/DF GTB 2 x 10 (not performed today)  Quad sets x 5 minutes  SLR 2 x10 with quad set    SL hip abduction 2 x 10  (not performed today)  Prone hip extension 2 x 10 (not performed today)  Calf stretch with strap x 30 sec x 5   Long sitting hamstring stretch 3 x 30s "      SAQ over large bolster x 3 minutes, 5 sec hold in extension), 1#  Heel slides x 5 minute with sheet and board  LAQs 2 x 10 1# (not performed today)  HS curls GTB 2 x 10 (not performed today)    Mindi received the following manual therapy techniques: Soft tissue Mobilization were applied to the: (L) knee for 10 minutes, including:  STM to (L) quad: seated EOM, followed by knee flexion stretch     Home Exercises Provided and Patient Education Provided     Education provided:   - Updated HEP  - Bending 1x per hour     Written Home Exercises Provided: Patient instructed to cont prior HEP.  Exercises were reviewed and Mindi was able to demonstrate them prior to the end of the session.  Mindi demonstrated good  understanding of the education provided.     See EMR under Media for exercises provided 2/6/2020.    Assessment     Mindi presented to clinic lacking full knee extension today. Gradual restoration achieved with quad sets and posterior knee stretching but overpressure from PTA required to achieve full ROM. Patient demonstrates significant distal quad restriction with pain/discomfort noted with direct palpation/manual therapy skills. Global knee pain and discomfort continues with attempts at knee flexion greater than 80 degrees. Provided updated HEP handout instructing patient to bend 1x per hour to improve ROM.   Mindi is progressing well towards her goals.   Pt prognosis is Excellent.     Pt will continue to benefit from skilled outpatient physical therapy to address the deficits listed in the problem list box on initial evaluation, provide pt/family education and to maximize pt's level of independence in the home and community environment.     Pt's spiritual, cultural and educational needs considered and pt agreeable to plan of care and goals.    Anticipated barriers to physical therapy: None    Goals:   Short-Term Goals: 6 weeks  - The patient will be independent with initial home exercise program.  - The patient  will increase strength to at least 4+/5 to perform functional mobility including improvements with transfers and overall mobility  - The patient will increase (L) knee ROM grossly from 0 degrees to 90 degrees to perform dressing and ADLs with pain < 3/10.     Long-Term Goals: 12 weeks  - Pt to achieve <44% limitation as measured by the FOTO to demonstrate decreased disability.  - The patient will be independent with home exercise program and symptom management.  - The patient will be independent amb with no assistive device on all surfaces for community distances.  - The patient will increase strength to at least 5/5 to perform functional mobility including ambulation, ascending/descending stairs, and return to activity.    - The patient will increase (L) knee ROM to 0 to 125 degrees to perform ambulation with pain < 3/10.    Plan     Continue focus of improving knee ROM and LE strengthening    Samia Hardwick, PTA

## 2020-03-05 ENCOUNTER — CLINICAL SUPPORT (OUTPATIENT)
Dept: REHABILITATION | Facility: HOSPITAL | Age: 70
End: 2020-03-05
Attending: ORTHOPAEDIC SURGERY
Payer: MEDICARE

## 2020-03-05 DIAGNOSIS — R26.9 GAIT DIFFICULTY: ICD-10-CM

## 2020-03-05 DIAGNOSIS — M25.662 DECREASED RANGE OF MOTION OF LEFT KNEE: ICD-10-CM

## 2020-03-05 DIAGNOSIS — M25.562 ACUTE PAIN OF LEFT KNEE: ICD-10-CM

## 2020-03-05 PROCEDURE — 97110 THERAPEUTIC EXERCISES: CPT | Mod: PO
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
## 2020-03-09 NOTE — PROGRESS NOTES
"  Physical Therapy Daily Treatment Note     Name: Mindi Agudelo  Clinic Number: [id number]    Therapy Diagnosis:   Encounter Diagnoses   Name Primary?    Gait difficulty     Decreased range of motion of left knee     Acute pain of left knee      Physician: Teodoro Gutiérrez MD    Visit Date: 3/10/2020  Physician Orders: PT Eval and Treat , TDWB LLE, ROM as tolerated in brace   Medical Diagnosis from Referral: Closed nondisplaced fracture of distal epiphysis of left femur, initial encounter   Evaluation Date: 2/4/2020  Authorization Period Expiration: 12/31/2020  Plan of Care Expiration: 05/15/2020  Visit # / Visits authorized: 10/ 20    Time In: 1000 AM  Time Out: 1100 AM  Total Billable Time: 35 minutes    Precautions: Standard, Weightbearing and TTWB LLE, Aggresive ROM per MD note 2/11/2020    Subjective     Pt reports: She feels a pinching sensation at her lateral knee when she is trying to flex her knee. "I am really trying." She was compliant with home exercise program.  Response to previous treatment: No change  Functional change: limited post operative weightbearing restrictions     Pain: 0/10  Location: left knee                                                                                                                                                           Objective     Knee PROM 0 to 74 (sitting on edge of mat)     Mindi received therapeutic exercises to develop strength, endurance and ROM for 55 minutes including:  Heel prop knee extension stretch with 5# weight x 5 min   Quad sets x 5 minutes  Quad sets with heel prop and strap 15x10''  Calf stretch with strap x 30 sec x 5   Long sitting hamstring stretch 3 x 30s    SAQ over large bolster x 3 minutes, 5 sec hold in extension)  SLR 2 x10 with quad set    Semi-recumbent SLR 2 x 10 5'' holds   Heel slides x 5 minute with sheet and board    Recumbent bike x 10 minutes - partial revolutions  Supine hip flexor stretch off edge of mat with strap due to  " lack of ROM x 1 minute x 4   Precor hamstring curls 2 x 10  25#    Ankle PF/DF GTB 2 x 10 (not performed today)  SL hip abduction 2 x 10  (not performed today)  Prone hip extension 2 x 10 (not performed today)    LAQs 2 x 10    Mindi received the following manual therapy techniques: Soft tissue Mobilization were applied to the: (L) knee for 05 minutes, including:  STM to (L) quad: seated EOM, followed by knee flexion stretch     Home Exercises Provided and Patient Education Provided     Education provided:   - Updated HEP  - Bending 1x per hour     Written Home Exercises Provided: Patient instructed to cont prior HEP.  Exercises were reviewed and Mindi was able to demonstrate them prior to the end of the session.  Mindi demonstrated good  understanding of the education provided.     See EMR under Media for exercises provided 2/6/2020.    Assessment     Mindi again presented with decreased knee extension and was able to  achieve a~75 deg of knee flexion sitting on the edge of the mat. Limited tolerance to manual therapy and passive ROM today due to increased medial and lateral joint line pain. Messaged Dr. Gutiérrez about patient's lack of progress with ROM. Instructed patient to continue with NMES to quad at home. Will continue to focus on improving knee ROM.     Mindi is progressing well towards her goals.   Pt prognosis is Excellent.     Pt will continue to benefit from skilled outpatient physical therapy to address the deficits listed in the problem list box on initial evaluation, provide pt/family education and to maximize pt's level of independence in the home and community environment.     Pt's spiritual, cultural and educational needs considered and pt agreeable to plan of care and goals.    Anticipated barriers to physical therapy: None    Goals:   Short-Term Goals: 6 weeks  - The patient will be independent with initial home exercise program.  - The patient will increase strength to at least 4+/5 to perform  functional mobility including improvements with transfers and overall mobility  - The patient will increase (L) knee ROM grossly from 0 degrees to 90 degrees to perform dressing and ADLs with pain < 3/10.     Long-Term Goals: 12 weeks  - Pt to achieve <44% limitation as measured by the FOTO to demonstrate decreased disability.  - The patient will be independent with home exercise program and symptom management.  - The patient will be independent amb with no assistive device on all surfaces for community distances.  - The patient will increase strength to at least 5/5 to perform functional mobility including ambulation, ascending/descending stairs, and return to activity.    - The patient will increase (L) knee ROM to 0 to 125 degrees to perform ambulation with pain < 3/10.    Plan     Continue focus of improving knee ROM and LE strengthening    Bernabe Huerta, PT

## 2020-03-10 ENCOUNTER — CLINICAL SUPPORT (OUTPATIENT)
Dept: REHABILITATION | Facility: HOSPITAL | Age: 70
End: 2020-03-10
Attending: ORTHOPAEDIC SURGERY
Payer: MEDICARE

## 2020-03-10 DIAGNOSIS — R26.9 GAIT DIFFICULTY: ICD-10-CM

## 2020-03-10 DIAGNOSIS — M25.662 DECREASED RANGE OF MOTION OF LEFT KNEE: ICD-10-CM

## 2020-03-10 DIAGNOSIS — M25.562 ACUTE PAIN OF LEFT KNEE: ICD-10-CM

## 2020-03-10 PROCEDURE — 97110 THERAPEUTIC EXERCISES: CPT | Mod: PO

## 2020-03-12 ENCOUNTER — CLINICAL SUPPORT (OUTPATIENT)
Dept: REHABILITATION | Facility: HOSPITAL | Age: 70
End: 2020-03-12
Attending: ORTHOPAEDIC SURGERY
Payer: MEDICARE

## 2020-03-12 DIAGNOSIS — M25.662 DECREASED RANGE OF MOTION OF LEFT KNEE: ICD-10-CM

## 2020-03-12 DIAGNOSIS — R26.9 GAIT DIFFICULTY: ICD-10-CM

## 2020-03-12 DIAGNOSIS — M25.562 ACUTE PAIN OF LEFT KNEE: ICD-10-CM

## 2020-03-12 PROCEDURE — 97110 THERAPEUTIC EXERCISES: CPT | Mod: PO,CQ

## 2020-03-12 NOTE — PROGRESS NOTES
"  Physical Therapy Daily Treatment Note     Name: Mindi Agudelo  Clinic Number: [id number]    Therapy Diagnosis:   Encounter Diagnoses   Name Primary?    Gait difficulty     Decreased range of motion of left knee     Acute pain of left knee      Physician: Teodoro Gutiérrez MD    Visit Date: 3/12/2020  Physician Orders: PT Eval and Treat , TDWB LLE, ROM as tolerated in brace   Medical Diagnosis from Referral: Closed nondisplaced fracture of distal epiphysis of left femur, initial encounter   Evaluation Date: 2/4/2020  Authorization Period Expiration: 12/31/2020  Plan of Care Expiration: 05/15/2020  Visit # / Visits authorized: 11/ 20    Time In: 1000 AM  Time Out: 1100 AM  Total Billable Time: 55 minutes    Precautions: Standard, Weightbearing and TTWB LLE, Aggresive ROM per MD note 2/11/2020    Subjective     Pt reports: she doesn't want us to push her bending until she gets an xray to determine that there is nothing "mechanical" going on in her knee. Patient states she continues to work hard at home and she did a lot of stretching into extension this morning. She was compliant with home exercise program.  Response to previous treatment: No change  Functional change: limited post operative weightbearing restrictions     Pain: 0/10  Location: left knee                                                                                                                                                           Objective     Knee PROM 0 to 78 (sitting on edge of mat)     Mindi received therapeutic exercises to develop strength, endurance and ROM for 55 minutes including:  Heel prop knee extension stretch with 5# weight x 5 min   Quad sets x 5 minutes  Quad sets with heel prop and strap 15x10''  Calf stretch with strap x 30 sec x 5   Long sitting hamstring stretch 3 x 30s    SAQ over large bolster x 3 minutes, 5 sec hold    extension)  SLR 2 x10 with quad set    Semi-recumbent SLR 2 x 10 5'' holds   Heel slides x 5 minute with " "sheet and board    Recumbent bike x 10 minutes - partial revolutions  Supine hip flexor stretch off edge of mat with strap due to  lack of ROM x 1 minute x 4   Precor hamstring curls 2 x 10  25#    Ankle PF/DF GTB 2 x 10 (not performed today)  SL hip abduction 2 x 10  (not performed today)  Prone hip extension 2 x 10 (not performed today)    LAQs 2 x 10    Mindi received the following manual therapy techniques: Soft tissue Mobilization were applied to the: (L) knee for 00 minutes, including:  STM to (L) quad: seated EOM, followed by knee flexion stretch     Home Exercises Provided and Patient Education Provided     Education provided:   - Updated HEP  - Bending 1x per hour     Written Home Exercises Provided: Patient instructed to cont prior HEP.  Exercises were reviewed and Mindi was able to demonstrate them prior to the end of the session.  Mindi demonstrated good  understanding of the education provided.     See EMR under Media for exercises provided 2/6/2020.    Assessment     Mindi presented to clinic with improved knee extension ROM and quad activation today. Patient again fearful that something "mechanical" may be limiting her bending and she did not want overpressure to improve knee flexion today. She did allow for PTA to push into slight overpressure today but she remains uncertain with many questions regarding medial knee pain when bending. Patient able to perform active hamstring curl/strengthenign without complaints of L knee pain.     Mindi is progressing well towards her goals.   Pt prognosis is Excellent.     Pt will continue to benefit from skilled outpatient physical therapy to address the deficits listed in the problem list box on initial evaluation, provide pt/family education and to maximize pt's level of independence in the home and community environment.     Pt's spiritual, cultural and educational needs considered and pt agreeable to plan of care and goals.    Anticipated barriers to physical " therapy: None    Goals:   Short-Term Goals: 6 weeks  - The patient will be independent with initial home exercise program.  - The patient will increase strength to at least 4+/5 to perform functional mobility including improvements with transfers and overall mobility  - The patient will increase (L) knee ROM grossly from 0 degrees to 90 degrees to perform dressing and ADLs with pain < 3/10.     Long-Term Goals: 12 weeks  - Pt to achieve <44% limitation as measured by the FOTO to demonstrate decreased disability.  - The patient will be independent with home exercise program and symptom management.  - The patient will be independent amb with no assistive device on all surfaces for community distances.  - The patient will increase strength to at least 5/5 to perform functional mobility including ambulation, ascending/descending stairs, and return to activity.    - The patient will increase (L) knee ROM to 0 to 125 degrees to perform ambulation with pain < 3/10.    Plan     Continue focus of improving knee ROM and LE strengthening    Samia Hardwick, PTA

## 2020-03-13 DIAGNOSIS — S72.445D: ICD-10-CM

## 2020-03-13 DIAGNOSIS — Z87.81 S/P ORIF (OPEN REDUCTION INTERNAL FIXATION) FRACTURE: ICD-10-CM

## 2020-03-13 DIAGNOSIS — Z98.890 S/P ORIF (OPEN REDUCTION INTERNAL FIXATION) FRACTURE: ICD-10-CM

## 2020-03-13 DIAGNOSIS — M25.662 KNEE STIFFNESS, LEFT: Primary | ICD-10-CM

## 2020-03-16 ENCOUNTER — PATIENT MESSAGE (OUTPATIENT)
Dept: ORTHOPEDICS | Facility: CLINIC | Age: 70
End: 2020-03-16

## 2020-03-17 ENCOUNTER — PATIENT MESSAGE (OUTPATIENT)
Dept: ORTHOPEDICS | Facility: CLINIC | Age: 70
End: 2020-03-17

## 2020-03-20 ENCOUNTER — TELEPHONE (OUTPATIENT)
Dept: ORTHOPEDICS | Facility: CLINIC | Age: 70
End: 2020-03-20

## 2020-03-20 DIAGNOSIS — Z87.81 S/P ORIF (OPEN REDUCTION INTERNAL FIXATION) FRACTURE: Primary | ICD-10-CM

## 2020-03-20 DIAGNOSIS — Z98.890 S/P ORIF (OPEN REDUCTION INTERNAL FIXATION) FRACTURE: Primary | ICD-10-CM

## 2020-03-20 DIAGNOSIS — S72.445D: ICD-10-CM

## 2020-03-24 ENCOUNTER — HOSPITAL ENCOUNTER (OUTPATIENT)
Dept: RADIOLOGY | Facility: HOSPITAL | Age: 70
Discharge: HOME | End: 2020-03-24
Attending: ORTHOPAEDIC SURGERY
Payer: MEDICARE

## 2020-03-24 ENCOUNTER — OFFICE VISIT (OUTPATIENT)
Dept: ORTHOPEDICS | Facility: CLINIC | Age: 70
End: 2020-03-24
Payer: MEDICARE

## 2020-03-24 VITALS
DIASTOLIC BLOOD PRESSURE: 65 MMHG | HEART RATE: 75 BPM | TEMPERATURE: 99 F | BODY MASS INDEX: 19.29 KG/M2 | HEIGHT: 66 IN | WEIGHT: 120 LBS | SYSTOLIC BLOOD PRESSURE: 154 MMHG

## 2020-03-24 DIAGNOSIS — Z87.81 S/P ORIF (OPEN REDUCTION INTERNAL FIXATION) FRACTURE: ICD-10-CM

## 2020-03-24 DIAGNOSIS — S72.445D: ICD-10-CM

## 2020-03-24 DIAGNOSIS — Z98.890 S/P ORIF (OPEN REDUCTION INTERNAL FIXATION) FRACTURE: Primary | ICD-10-CM

## 2020-03-24 DIAGNOSIS — Z98.890 S/P ORIF (OPEN REDUCTION INTERNAL FIXATION) FRACTURE: ICD-10-CM

## 2020-03-24 DIAGNOSIS — Z87.81 S/P ORIF (OPEN REDUCTION INTERNAL FIXATION) FRACTURE: Primary | ICD-10-CM

## 2020-03-24 PROCEDURE — 73552 X-RAY EXAM OF FEMUR 2/>: CPT | Mod: 26,LT,, | Performed by: RADIOLOGY

## 2020-03-24 PROCEDURE — 73552 X-RAY EXAM OF FEMUR 2/>: CPT | Mod: TC,PO,LT

## 2020-03-24 PROCEDURE — 99999 PR PBB SHADOW E&M-EST. PATIENT-LVL III: ICD-10-PCS | Mod: PBBFAC,,, | Performed by: ORTHOPAEDIC SURGERY

## 2020-03-24 PROCEDURE — 99024 PR POST-OP FOLLOW-UP VISIT: ICD-10-PCS | Mod: POP,,, | Performed by: ORTHOPAEDIC SURGERY

## 2020-03-24 PROCEDURE — 99213 OFFICE O/P EST LOW 20 MIN: CPT | Mod: PBBFAC,25,PN | Performed by: ORTHOPAEDIC SURGERY

## 2020-03-24 PROCEDURE — 73552 XR FEMUR 2 VIEW LEFT: ICD-10-PCS | Mod: 26,LT,, | Performed by: RADIOLOGY

## 2020-03-24 PROCEDURE — 99999 PR PBB SHADOW E&M-EST. PATIENT-LVL III: CPT | Mod: PBBFAC,,, | Performed by: ORTHOPAEDIC SURGERY

## 2020-03-24 PROCEDURE — 99024 POSTOP FOLLOW-UP VISIT: CPT | Mod: POP,,, | Performed by: ORTHOPAEDIC SURGERY

## 2020-03-24 RX ORDER — ONDANSETRON HYDROCHLORIDE 8 MG/1
8 TABLET, FILM COATED ORAL EVERY 8 HOURS PRN
Qty: 22 TABLET | Refills: 0 | Status: SHIPPED | OUTPATIENT
Start: 2020-03-24 | End: 2022-03-30 | Stop reason: SDUPTHER

## 2020-03-24 RX ORDER — METHOCARBAMOL 750 MG/1
750 TABLET, FILM COATED ORAL 4 TIMES DAILY PRN
Qty: 44 TABLET | Refills: 0 | Status: SHIPPED | OUTPATIENT
Start: 2020-03-24 | End: 2020-10-06

## 2020-03-24 NOTE — PROGRESS NOTES
Chief Complaint   Patient presents with    Post-op Evaluation     ORIF left femur 1/28/20       HPI:  69 y.o. returns to clinic today status post left femur ORIF 8 weeks ago. Pain is mild. Patient is compliant most of the time with restrictions.         left knee: ROM 0-90. Overall normal alignment. Incisions well approximated with staples No erythema or fluctuance. Stable to stress. Skin intact. Compartments soft. NVI distally.      X-rays were performed today, personally reviewed by me and findings discussed with the patient.  2 views of the left femur show hardware intact in good position         S/P ORIF (open reduction internal fixation) fracture    Closed nondisplaced fracture of distal epiphysis of left femur with routine healing, subsequent encounter    Other orders  -     methocarbamoL (ROBAXIN) 750 MG Tab; Take 1 tablet (750 mg total) by mouth 4 (four) times daily as needed.  Dispense: 44 tablet; Refill: 0        Continue aggressive ROM. RTC 6 weeks with repeat Xrays.

## 2020-04-01 ENCOUNTER — PATIENT MESSAGE (OUTPATIENT)
Dept: REHABILITATION | Facility: HOSPITAL | Age: 70
End: 2020-04-01

## 2020-04-02 ENCOUNTER — PATIENT OUTREACH (OUTPATIENT)
Dept: OTHER | Facility: OTHER | Age: 70
End: 2020-04-02

## 2020-04-02 NOTE — PROGRESS NOTES
"Digital Medicine: Health  Follow-Up    Patient reports doing well and staying isolated. She continues to work on recovery as her doctor stated that she was healed with no restrictions. Patient admits that she was "dying to get out there and exercise". Patient had her hiking poles walking a mile. Patient states that it took her 20 minutes at a slow pace. Patient states that her body "took a toll" and her hip started to hurt. She states that she now uses her walker to get warmed up. She reports getting great information from PT with exercises to do. Patient plans to continue exercise/recovery.     She reports not taking as many readings due to pain and stress from putting on the splint for her leg.         The history is provided by the patient.     Follow Up  Follow-up reason(s): reading review and routine education      Readings are trending down due to lifestyle change and medication adherence.    Routine Education Topics: eating patterns and physical activity        INTERVENTION(S)  encouragement/support, denied resources and denied questions    PLAN  patient verbalizes understanding and patient amenable to changes    Offered patient support and encouraged patient to continue her great efforts as she recovers. Patient appreciated the follow-up call and knows to call if any future questions or concerns.       There are no preventive care reminders to display for this patient.    Last 5 Patient Entered Readings                                      Current 30 Day Average: 121/66     Recent Readings 3/30/2020 3/17/2020 3/12/2020 2/23/2020 2/18/2020    SBP (mmHg) 127 124 113 114 109    DBP (mmHg) 66 68 63 63 66    Pulse 56 58 57 58 59                      Diet Screening   No change to diet.  Patient reports eating or drinking the following: water and fresh vegetablesShe has the following dietary restrictions: low sodium diet    Barriers to a Healthy Diet: no barriers to healthy eating    Patient states that her " weight has remained the same. No changes in eating habits.   Drinks plenty of water and stays hydrated.       Assigning the following patient goals: maintain low sodium diet    Physical Activity Screening   When asked if exercising, patient responded: yes  Patient has limited mobility: recovering from surgery/rehabHer level of intensity when exercising is low.    Patient participates in the following activities: walking and physical therapy/rehab    She identified the following barriers to physical activity: pain/injury/recent surgery    Currently doing PT exercises.       SDOH

## 2020-04-03 ENCOUNTER — PATIENT MESSAGE (OUTPATIENT)
Dept: REHABILITATION | Facility: HOSPITAL | Age: 70
End: 2020-04-03

## 2020-04-08 ENCOUNTER — PATIENT MESSAGE (OUTPATIENT)
Dept: OTHER | Facility: OTHER | Age: 70
End: 2020-04-08

## 2020-04-08 ENCOUNTER — TELEPHONE (OUTPATIENT)
Dept: REHABILITATION | Facility: HOSPITAL | Age: 70
End: 2020-04-08

## 2020-04-08 NOTE — TELEPHONE ENCOUNTER
Mindi Agudelo was called in regards to [facility] now offering telemedicine virtual visits through the [facility].    Mindi Agudelo requested to be contacted and scheduled once therapy services can be delivered virtually.  Patient will be called and scheduled at a later time.  All questions were answered and patient verbalized understanding with PT's POC.    Margret Bergeron, PTA  04/08/2020

## 2020-04-09 ENCOUNTER — PATIENT MESSAGE (OUTPATIENT)
Dept: OTHER | Facility: OTHER | Age: 70
End: 2020-04-09

## 2020-04-14 ENCOUNTER — CLINICAL SUPPORT (OUTPATIENT)
Dept: REHABILITATION | Facility: HOSPITAL | Age: 70
End: 2020-04-14
Attending: ORTHOPAEDIC SURGERY
Payer: MEDICARE

## 2020-04-14 DIAGNOSIS — M25.662 DECREASED RANGE OF MOTION OF LEFT KNEE: ICD-10-CM

## 2020-04-14 DIAGNOSIS — M25.562 ACUTE PAIN OF LEFT KNEE: ICD-10-CM

## 2020-04-14 DIAGNOSIS — R26.9 GAIT DIFFICULTY: ICD-10-CM

## 2020-04-14 PROCEDURE — 97110 THERAPEUTIC EXERCISES: CPT | Mod: PO
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
## 2020-04-14 NOTE — PROGRESS NOTES
"  Physical Therapy Daily Treatment Note     Name: Mindi Agudelo  Clinic Number: [id number]    Patient unsafe for in-person office visit due to high risk co-morbidities, probability of infection, to minimize exposure, due to pt expressing symptoms, and/or due to government mandated quarantine.  This patient: (1) was informed that telehealth visits are now available congruent with guidelines set by state practice acts & CMS; (2) initiated scheduling of this type of visit; and (3) gave verbal consent to participate in such.  The patient & provider used Epic Suzi using both video & audio synchronous services to complete the visit.    Therapy Diagnosis:   Encounter Diagnoses   Name Primary?    Gait difficulty     Decreased range of motion of left knee     Acute pain of left knee      Physician: Teodoro Gutiérrez MD    Visit Date: 4/14/2020  Patient Location: Home  Visit type: Virtual visit with synchronous audio and video through [employer]    Physician Orders: PT Eval and Treat  Medical Diagnosis from Referral: Closed nondisplaced fracture of distal epiphysis of left femur, initial encounter   Evaluation Date: 2/4/2020  Authorization Period Expiration: 12/31/2020  Plan of Care Expiration: 05/15/2020  Visit # / Visits authorized: 12/ 20    Time Connection Initiated: 1300  Time Connection Terminated: 1327  Total Billable Time: 27 minutes    Precautions: Standard    Subjective     Pt reports: She has been wearing her becca-splint cranked up to 11. She reports she has been working on her home exercise program daily.   She was compliant with home exercise program.  Response to previous treatment: Improved knee ROM   Functional change: Improved knee ROM and able to ride bike     Pain: 3/10 "stiffness"  Location: left knee      Objective     Mindi received therapeutic exercises to develop strength, endurance, ROM and flexibility for 27 minutes including:  Review of previous exercises:  Supine knee extension long duration   Quad " Sets  SLR  Heel slides  Prone Quad stretch with strap  ROM on bicycle  Graded return to walking      New exercises added and demonstrated:   Mini Squats with instruction to progress with depth as appropriate x 10   Standing TKEs x 10   Standing HS curls x 10   SLS x 30s   Clamshells        Home Exercises Provided and Patient Education Provided     Education provided:   - HEP  - Progression of walking exercises  - Emphasized importance of knee extension and quad control     Written Home Exercises Provided: Patient instructed to cont prior HEP.  Exercises were reviewed and Mindi was able to demonstrate them prior to the end of the session.  Mindi demonstrated good  understanding of the education provided.     See EMR under Media for exercises provided prior visit.    Assessment     Mindi has been progressing well with weight-bearing ambulation with no exacerbation of her knee pain. She remains significantly limited with her knee ROM with both flexion and extension. Educated patient to slowly progress with walking and increase distance ambulated gradually from .25 miles to .5 miles, etc. Goals updated to reflect progress. Will continue with virtual visits once every 2 weeks.      Mindi is progressing well towards her goals.   Pt prognosis is Good.     Pt will continue to benefit from skilled outpatient physical therapy to address the deficits listed in the problem list box on initial evaluation, provide pt/family education and to maximize pt's level of independence in the home and community environment.     Pt's spiritual, cultural and educational needs considered and pt agreeable to plan of care and goals.     Anticipated barriers to physical therapy: COVID-19    Goals:   Short-Term Goals: 6 weeks  - The patient will be independent with initial home exercise program. (Met)  - The patient will increase strength to at least 4+/5 to perform functional mobility including improvements with transfers and overall mobility.  (Unable to assess)  - The patient will increase (L) knee ROM grossly from 0 degrees to 90 degrees to perform dressing and ADLs with pain < 3/10. (Unable to formally assess)      Long-Term Goals: 12 weeks  - Pt to achieve <44% limitation as measured by the FOTO to demonstrate decreased disability. - (Unable to assess)  - The patient will be independent with home exercise program and symptom management. (Met)  - The patient will be independent amb with no assistive device on all surfaces for community distances. (Met)  - The patient will increase strength to at least 5/5 to perform functional mobility including ambulation, ascending/descending stairs, and return to activity. - (Unable to assess)  - The patient will increase (L) knee ROM to 0 to 125 degrees to perform ambulation with pain < 3/10. - (Visually not met)    Plan     Continue with virtual visits 1x every other week for progression of exercises x 4 weeks     Bernabe Huerta, PT

## 2020-04-30 ENCOUNTER — PATIENT MESSAGE (OUTPATIENT)
Dept: ORTHOPEDICS | Facility: CLINIC | Age: 70
End: 2020-04-30

## 2020-04-30 ENCOUNTER — TELEPHONE (OUTPATIENT)
Dept: ORTHOPEDICS | Facility: CLINIC | Age: 70
End: 2020-04-30

## 2020-04-30 DIAGNOSIS — Z87.81 S/P ORIF (OPEN REDUCTION INTERNAL FIXATION) FRACTURE: Primary | ICD-10-CM

## 2020-04-30 DIAGNOSIS — S72.445D: ICD-10-CM

## 2020-04-30 DIAGNOSIS — Z98.890 S/P ORIF (OPEN REDUCTION INTERNAL FIXATION) FRACTURE: Primary | ICD-10-CM

## 2020-04-30 NOTE — TELEPHONE ENCOUNTER
Called pt to ask COVID-19 and travel screening questions for upcoming appt. Advised to come to driveway and entrance #3 for appt. Advised pt will be only one allowed into clinic for appt. Thanks, Melisa

## 2020-05-01 ENCOUNTER — PATIENT MESSAGE (OUTPATIENT)
Dept: ORTHOPEDICS | Facility: CLINIC | Age: 70
End: 2020-05-01

## 2020-05-01 DIAGNOSIS — I10 ESSENTIAL HYPERTENSION: ICD-10-CM

## 2020-05-01 RX ORDER — LOSARTAN POTASSIUM 25 MG/1
TABLET ORAL
Qty: 90 TABLET | Refills: 2 | Status: SHIPPED | OUTPATIENT
Start: 2020-05-01 | End: 2021-01-20

## 2020-05-05 ENCOUNTER — HOSPITAL ENCOUNTER (OUTPATIENT)
Dept: RADIOLOGY | Facility: HOSPITAL | Age: 70
Discharge: HOME | End: 2020-05-05
Attending: ORTHOPAEDIC SURGERY
Payer: MEDICARE

## 2020-05-05 ENCOUNTER — OFFICE VISIT (OUTPATIENT)
Dept: ORTHOPEDICS | Facility: CLINIC | Age: 70
End: 2020-05-05
Payer: MEDICARE

## 2020-05-05 VITALS — DIASTOLIC BLOOD PRESSURE: 66 MMHG | SYSTOLIC BLOOD PRESSURE: 144 MMHG | HEART RATE: 66 BPM | TEMPERATURE: 98 F

## 2020-05-05 DIAGNOSIS — Z87.81 S/P ORIF (OPEN REDUCTION INTERNAL FIXATION) FRACTURE: ICD-10-CM

## 2020-05-05 DIAGNOSIS — Z98.890 S/P ORIF (OPEN REDUCTION INTERNAL FIXATION) FRACTURE: ICD-10-CM

## 2020-05-05 DIAGNOSIS — S72.445D: ICD-10-CM

## 2020-05-05 DIAGNOSIS — S72.445D: Primary | ICD-10-CM

## 2020-05-05 PROCEDURE — 99214 OFFICE O/P EST MOD 30 MIN: CPT | Mod: S$PBB,,, | Performed by: ORTHOPAEDIC SURGERY

## 2020-05-05 PROCEDURE — 73552 X-RAY EXAM OF FEMUR 2/>: CPT | Mod: TC,PO,LT

## 2020-05-05 PROCEDURE — 73552 X-RAY EXAM OF FEMUR 2/>: CPT | Mod: 26,LT,, | Performed by: RADIOLOGY

## 2020-05-05 PROCEDURE — 99214 PR OFFICE/OUTPT VISIT, EST, LEVL IV, 30-39 MIN: ICD-10-PCS | Mod: S$PBB,,, | Performed by: ORTHOPAEDIC SURGERY

## 2020-05-05 PROCEDURE — 99999 PR PBB SHADOW E&M-EST. PATIENT-LVL III: CPT | Mod: PBBFAC,,, | Performed by: ORTHOPAEDIC SURGERY

## 2020-05-05 PROCEDURE — 73552 XR FEMUR 2 VIEW LEFT: ICD-10-PCS | Mod: 26,LT,, | Performed by: RADIOLOGY

## 2020-05-05 PROCEDURE — 99213 OFFICE O/P EST LOW 20 MIN: CPT | Mod: PBBFAC,25,PN | Performed by: ORTHOPAEDIC SURGERY

## 2020-05-05 PROCEDURE — 99999 PR PBB SHADOW E&M-EST. PATIENT-LVL III: ICD-10-PCS | Mod: PBBFAC,,, | Performed by: ORTHOPAEDIC SURGERY

## 2020-05-05 NOTE — PROGRESS NOTES
Chief Complaint   Patient presents with    Left Femur - Post-op Evaluation     ORIF L FEMUR 1/28/2020       HPI:   This is a 69 y.o. who returns today status post left femur ORIF 3 months ago. Pain is mild. Patient is compliant most of the time with restrictions. Patient has been WBAT. No numbness or tingling. No associated signs or symptoms.    Past Medical History:   Diagnosis Date    Arthritis     Cataract     OU    GERD (gastroesophageal reflux disease)     Hemorrhoid     Hypertension     Mild vitamin D deficiency     ANG on CPAP     Osteopenia     Thyroid disease      Past Surgical History:   Procedure Laterality Date    COLONOSCOPY  6/15    no polyps, Dr. Cabral [place]    DENTAL SURGERY  2019    ESOPHAGOGASTRODUODENOSCOPY      ORIF FEMUR FRACTURE Left 1/28/2020    Procedure: ORIF, FRACTURE, FEMUR;  Surgeon: Teodoro Gutiérrez MD;  [facility];  Service: Orthopedics;  Laterality: Left;    TONSILLECTOMY       Current Outpatient Medications on File Prior to Visit   Medication Sig Dispense Refill    acetaminophen (TYLENOL) 500 MG tablet Take 2 tablets (1,000 mg total) by mouth every 8 (eight) hours. 90 tablet 0    aspirin 81 MG Chew Take 1 tablet (81 mg total) by mouth once daily. 36 tablet 0    b complex vitamins tablet Take 1 tablet by mouth as needed.       CALCIUM CARBONATE/VITAMIN D3 (VITAMIN D-3 ORAL) Take 1 tablet by mouth once daily.       HYDROcodone-acetaminophen (NORCO) 5-325 mg per tablet Take 1 tablet by mouth every 6 (six) hours as needed for Pain. 12 tablet 0    losartan (COZAAR) 25 MG tablet TAKE 1 TABLET(25 MG) BY MOUTH EVERY DAY 90 tablet 2    magnesium 30 mg Tab Take 500 mg by mouth once.      methocarbamoL (ROBAXIN) 750 MG Tab Take 1 tablet (750 mg total) by mouth 4 (four) times daily as needed. 44 tablet 0    multivitamin (ONE DAILY MULTIVITAMIN) per tablet Take 1 tablet by mouth as needed.       mupirocin (BACTROBAN) 2 % ointment Apply topically 3 (three) times  daily. 1 Tube 0    ondansetron (ZOFRAN) 8 MG tablet Take 1 tablet (8 mg total) by mouth every 8 (eight) hours as needed for Nausea. 22 tablet 0    ondansetron (ZOFRAN-ODT) 8 MG TbDL Take 1 tablet (8 mg total) by mouth every 8 (eight) hours as needed. 12 tablet 0    oxyCODONE (ROXICODONE) 5 MG immediate release tablet Take 1 tablet (5 mg total) by mouth every 4 (four) hours as needed. 33 tablet 0    traMADol (ULTRAM) 50 mg tablet Take 1 tablet (50 mg total) by mouth every 4 (four) hours as needed for Pain. 44 tablet 0    TURMERIC, BULK, MISC 450 mg by Misc.(Non-Drug; Combo Route) route once daily.       Current Facility-Administered Medications on File Prior to Visit   Medication Dose Route Frequency Provider Last Rate Last Dose    electrolyte-S (ISOLYTE)   Intravenous Continuous Alonso Shaikh MD        lactated ringers infusion   Intravenous Continuous Alonso Shaikh MD 10 mL/hr at 01/28/20 0730      lidocaine (PF) 10 mg/ml (1%) injection 10 mg  1 mL Intradermal Once Alonso Shaikh MD         Review of patient's allergies indicates:  No Known Allergies  Family History   Problem Relation Age of Onset    Cataracts Father     Heart disease Father         CHF    COPD Mother         smoker    COPD Sister         smoker    Diabetes Maternal Grandmother     Diabetes Maternal Grandfather     Diabetes Paternal Grandmother     Diabetes Paternal Grandfather      Social History     Socioeconomic History    Marital status: Single     Spouse name: Not on file    Number of children: 2    Years of education: Not on file    Highest education level: Not on file   Occupational History    Occupation: retired [employer]     Employer: [employer]   Social Needs    Financial resource strain: Not hard at all    Food insecurity:     Worry: Never true     Inability: Never true    Transportation needs:     Medical: No     Non-medical: No   Tobacco Use    Smoking status: Never Smoker     Smokeless tobacco: Never Used   Substance and Sexual Activity    Alcohol use: No     Alcohol/week: 0.0 standard drinks     Frequency: Never    Drug use: No    Sexual activity: Never   Lifestyle    Physical activity:     Days per week: Not on file     Minutes per session: Not on file    Stress: Not on file   Relationships    Social connections:     Talks on phone: More than three times a week     Gets together: More than three times a week     Attends Orthodoxy service: More than 4 times per year     Active member of club or organization: Yes     Attends meetings of clubs or organizations: More than 4 times per year     Relationship status:    Other Topics Concern    Not on file   Social History Narrative    Retired, 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       Review of Systems:  Constitutional:  Denies fever or chills   Eyes:  Denies change in visual acuity   HENT:  Denies nasal congestion or sore throat   Respiratory:  Denies cough or shortness of breath   Cardiovascular:  Denies chest pain or edema   GI:  Denies abdominal pain, nausea, vomiting, bloody stools or diarrhea   :  Denies dysuria   Integument:  Denies rash   Neurologic:  Denies headache, focal weakness or sensory changes   Endocrine:  Denies polyuria or polydipsia   Lymphatic:  Denies swollen glands   Psychiatric:  Denies depression or anxiety     Physical Exam:   Constitutional:  Well developed, well nourished, no acute distress, non-toxic appearance   Integument:  Well hydrated, no rash   Lymphatic:  No lymphadenopathy noted   Neurologic:  Alert & oriented x 3  Psychiatric:  Speech and behavior appropriate   Gi: abdomen soft  Eyes: EOMI      Right knee   Exam performed same as contralateral side and is normal      left knee: ROM 0-135. Overall normal alignment. Incisions healed.  No erythema or fluctuance. Stable to stress. Skin intact. Compartments soft. NVI distally.      X-rays were performed today, personally reviewed by me and  findings discussed with the patient.  2 views of the left femur show hardware intact in good position         Closed nondisplaced fracture of distal epiphysis of left femur with routine healing, subsequent encounter    S/P ORIF (open reduction internal fixation) fracture        Continue with no restrictions. RTC as needed

## 2020-05-06 ENCOUNTER — PATIENT MESSAGE (OUTPATIENT)
Dept: ADMINISTRATIVE | Facility: HOSPITAL | Age: 70
End: 2020-05-06

## 2020-05-28 ENCOUNTER — PATIENT OUTREACH (OUTPATIENT)
Dept: OTHER | Facility: OTHER | Age: 70
End: 2020-05-28

## 2020-06-04 ENCOUNTER — PES CALL (OUTPATIENT)
Dept: ADMINISTRATIVE | Facility: CLINIC | Age: 70
End: 2020-06-04

## 2020-06-16 ENCOUNTER — HOSPITAL ENCOUNTER (OUTPATIENT)
Dept: RADIOLOGY | Facility: HOSPITAL | Age: 70
Discharge: HOME | End: 2020-06-16
Attending: INTERNAL MEDICINE
Payer: MEDICARE

## 2020-06-16 DIAGNOSIS — E04.1 THYROID NODULE: ICD-10-CM

## 2020-06-16 PROCEDURE — 76536 US SOFT TISSUE HEAD NECK THYROID: ICD-10-PCS | Mod: 26,,, | Performed by: RADIOLOGY

## 2020-06-16 PROCEDURE — 76536 US EXAM OF HEAD AND NECK: CPT | Mod: 26,,, | Performed by: RADIOLOGY

## 2020-06-16 PROCEDURE — 76536 US EXAM OF HEAD AND NECK: CPT | Mod: TC,PO

## 2020-06-18 ENCOUNTER — PATIENT OUTREACH (OUTPATIENT)
Dept: ADMINISTRATIVE | Facility: OTHER | Age: 70
End: 2020-06-18

## 2020-06-19 ENCOUNTER — OFFICE VISIT (OUTPATIENT)
Dept: CARDIOLOGY | Facility: CLINIC | Age: 70
End: 2020-06-19
Payer: MEDICARE

## 2020-06-19 VITALS
WEIGHT: 122.81 LBS | DIASTOLIC BLOOD PRESSURE: 68 MMHG | HEART RATE: 70 BPM | BODY MASS INDEX: 19.74 KG/M2 | HEIGHT: 66 IN | SYSTOLIC BLOOD PRESSURE: 155 MMHG

## 2020-06-19 DIAGNOSIS — E78.2 MIXED HYPERLIPIDEMIA: ICD-10-CM

## 2020-06-19 DIAGNOSIS — I10 ESSENTIAL HYPERTENSION: Primary | ICD-10-CM

## 2020-06-19 DIAGNOSIS — Z01.30 ENCOUNTER FOR EXAMINATION OF BLOOD PRESSURE WITHOUT ABNORMAL FINDINGS: ICD-10-CM

## 2020-06-19 PROBLEM — I70.0 AORTIC ATHEROSCLEROSIS: Status: RESOLVED | Noted: 2020-01-24 | Resolved: 2020-06-19

## 2020-06-19 PROCEDURE — 99999 PR PBB SHADOW E&M-EST. PATIENT-LVL III: CPT | Mod: PBBFAC,,, | Performed by: INTERNAL MEDICINE

## 2020-06-19 PROCEDURE — 99204 OFFICE O/P NEW MOD 45 MIN: CPT | Mod: S$PBB,,, | Performed by: INTERNAL MEDICINE

## 2020-06-19 PROCEDURE — 99204 PR OFFICE/OUTPT VISIT, NEW, LEVL IV, 45-59 MIN: ICD-10-PCS | Mod: S$PBB,,, | Performed by: INTERNAL MEDICINE

## 2020-06-19 PROCEDURE — 99213 OFFICE O/P EST LOW 20 MIN: CPT | Mod: PBBFAC,PO | Performed by: INTERNAL MEDICINE

## 2020-06-19 PROCEDURE — 93010 ELECTROCARDIOGRAM REPORT: CPT | Mod: S$PBB,,, | Performed by: INTERNAL MEDICINE

## 2020-06-19 PROCEDURE — 99999 PR PBB SHADOW E&M-EST. PATIENT-LVL III: ICD-10-PCS | Mod: PBBFAC,,, | Performed by: INTERNAL MEDICINE

## 2020-06-19 PROCEDURE — 93005 ELECTROCARDIOGRAM TRACING: CPT | Mod: PBBFAC,PO | Performed by: INTERNAL MEDICINE

## 2020-06-19 PROCEDURE — 93010 EKG 12-LEAD: ICD-10-PCS | Mod: S$PBB,,, | Performed by: INTERNAL MEDICINE

## 2020-06-19 RX ORDER — ATORVASTATIN CALCIUM 40 MG/1
40 TABLET, FILM COATED ORAL DAILY
Qty: 90 TABLET | Refills: 3 | Status: SHIPPED | OUTPATIENT
Start: 2020-06-19 | End: 2021-06-12

## 2020-06-19 NOTE — PROGRESS NOTES
Subjective:    Patient ID:  Mindi Agudelo is a 69 y.o. female who presents for evaluation of Establish Care (Requesting a cardiac check up due to family history.  Pt states recent xray shows Intracranial calcific atherosclerosis) and Family hx (Father; CAD/stents - )      Problem List Items Addressed This Visit        Cardiac/Vascular    Essential hypertension - Primary          HPI    Referred by Dr. Carpenter    The patient states that she was here for cardiac evaluation. Dad had cardiac issues.   Broke her femur earlier in the year, on a head CT, there was intracranial arterial atherosclerosis noted.    Getting back to riding her back. Not having any chest pain or shortness of breath.    Home BP 120s systolic per log    On digital HTN program, likes it. Home BPs look great.    Personal history of heart attack or stroke - None that she is aware of  Family history of heart disease - Father; CAD/stents -     Past Medical History:   Diagnosis Date    Aortic atherosclerosis 2020    Arthritis     Cataract     OU    GERD (gastroesophageal reflux disease)     Hemorrhoid     HTN (hypertension) 2013    Hypertension     Mild vitamin D deficiency     ANG on CPAP     Osteopenia     Thyroid disease        Past Surgical History:   Procedure Laterality Date    COLONOSCOPY  6/15    no polyps, Dr. Cabral [place]    DENTAL SURGERY  2019    ESOPHAGOGASTRODUODENOSCOPY      ORIF FEMUR FRACTURE Left 2020    Procedure: ORIF, FRACTURE, FEMUR;  Surgeon: Teodoro Gutiérrez MD;  [facility] OR;  Service: Orthopedics;  Laterality: Left;    TONSILLECTOMY         Family History   Problem Relation Age of Onset    Cataracts Father     Heart disease Father         CHF    COPD Mother         smoker    COPD Sister         smoker    Diabetes Maternal Grandmother     Diabetes Maternal Grandfather     Diabetes Paternal Grandmother     Diabetes Paternal Grandfather        Social History      Socioeconomic History    Marital status: Single     Spouse name: Not on file    Number of children: 2    Years of education: Not on file    Highest education level: Not on file   Occupational History    Occupation: retired [employer]     Employer: [employer]   Social Needs    Financial resource strain: Not hard at all    Food insecurity     Worry: Never true     Inability: Never true    Transportation needs     Medical: No     Non-medical: No   Tobacco Use    Smoking status: Never Smoker    Smokeless tobacco: Never Used   Substance and Sexual Activity    Alcohol use: No     Alcohol/week: 0.0 standard drinks     Frequency: Never    Drug use: No    Sexual activity: Never   Lifestyle    Physical activity     Days per week: Not on file     Minutes per session: Not on file    Stress: Not on file   Relationships    Social connections     Talks on phone: More than three times a week     Gets together: More than three times a week     Attends Catholic service: More than 4 times per year     Active member of club or organization: Yes     Attends meetings of clubs or organizations: More than 4 times per year     Relationship status:    Other Topics Concern    Not on file   Social History Narrative    Retired, 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   Eyes: Negative for blurred vision.   Cardiovascular: Negative for chest pain, dyspnea on exertion, irregular heartbeat and leg swelling.   Respiratory: Negative for cough, hemoptysis, shortness of breath and wheezing.    Endocrine: Negative for cold intolerance and heat intolerance.   Hematologic/Lymphatic: Negative for bleeding problem.   Musculoskeletal: Negative for muscle weakness and myalgias.   Gastrointestinal: Negative for abdominal pain, constipation and  "diarrhea.   Genitourinary: Negative for bladder incontinence.   Neurological: Negative for dizziness and weakness.   Psychiatric/Behavioral: Negative for depression.        Objective:     Vitals:    06/19/20 1440   BP: (!) 155/68   BP Location: Right arm   Patient Position: Sitting   BP Method: Medium (Automatic)   Pulse: 70   Weight: 55.7 kg (122 lb 12.7 oz)   Height: 5' 6" (1.676 m)        Physical Exam   Constitutional: She is oriented to person, place, and time. She appears well-developed and well-nourished. No distress.   HENT:   Head: Normocephalic and atraumatic.   Neck: Neck supple. No JVD present.   Cardiovascular: Normal rate, regular rhythm and normal heart sounds. Exam reveals no gallop and no friction rub.   No murmur heard.  Pulmonary/Chest: Effort normal and breath sounds normal. No respiratory distress. She has no wheezes. She has no rales.   Abdominal: Soft. Bowel sounds are normal. There is no abdominal tenderness. There is no rebound and no guarding.   Musculoskeletal:         General: No tenderness or edema.   Neurological: She is alert and oriented to person, place, and time.   Skin: Skin is warm and dry.   Psychiatric: Her behavior is normal.   Nursing note and vitals reviewed.          Current Outpatient Medications on File Prior to Visit   Medication Sig    acetaminophen (TYLENOL) 500 MG tablet Take 2 tablets (1,000 mg total) by mouth every 8 (eight) hours.    aspirin 81 MG Chew Take 1 tablet (81 mg total) by mouth once daily.    b complex vitamins tablet Take 1 tablet by mouth as needed.     CALCIUM CARBONATE/VITAMIN D3 (VITAMIN D-3 ORAL) Take 1 tablet by mouth once daily.     HYDROcodone-acetaminophen (NORCO) 5-325 mg per tablet Take 1 tablet by mouth every 6 (six) hours as needed for Pain.    losartan (COZAAR) 25 MG tablet TAKE 1 TABLET(25 MG) BY MOUTH EVERY DAY    magnesium 30 mg Tab Take 500 mg by mouth once.    methocarbamoL (ROBAXIN) 750 MG Tab Take 1 tablet (750 mg total) by " mouth 4 (four) times daily as needed.    multivitamin (ONE DAILY MULTIVITAMIN) per tablet Take 1 tablet by mouth as needed.     mupirocin (BACTROBAN) 2 % ointment Apply topically 3 (three) times daily.    ondansetron (ZOFRAN) 8 MG tablet Take 1 tablet (8 mg total) by mouth every 8 (eight) hours as needed for Nausea.    ondansetron (ZOFRAN-ODT) 8 MG TbDL Take 1 tablet (8 mg total) by mouth every 8 (eight) hours as needed.    oxyCODONE (ROXICODONE) 5 MG immediate release tablet Take 1 tablet (5 mg total) by mouth every 4 (four) hours as needed.    traMADol (ULTRAM) 50 mg tablet Take 1 tablet (50 mg total) by mouth every 4 (four) hours as needed for Pain.    TURMERIC, BULK, MISC 450 mg by Misc.(Non-Drug; Combo Route) route once daily.     Current Facility-Administered Medications on File Prior to Visit   Medication    electrolyte-S (ISOLYTE)    lactated ringers infusion    lidocaine (PF) 10 mg/ml (1%) injection 10 mg       Lipid Panel:   Lab Results   Component Value Date    CHOL 203 (H) 01/28/2019    HDL 85 (H) 01/28/2019    LDLCALC 110.8 01/28/2019    TRIG 36 01/28/2019    CHOLHDL 41.9 01/28/2019         The 10-year ASCVD risk score (Viktor BENAVIDEZ Jr., et al., 2013) is: 13.1%    Values used to calculate the score:      Age: 69 years      Sex: Female      Is Non- : No      Diabetic: No      Tobacco smoker: No      Systolic Blood Pressure: 155 mmHg      Is BP treated: Yes      HDL Cholesterol: 85 mg/dL      Total Cholesterol: 203 mg/dL    All pertinent labs, imaging, and EKGs reviewed.  Patient's most recent EKG tracing was personally interpreted by this provider.    Assessment:       1. Essential hypertension         Plan:     Symptoms OK today  BP elevated today  Home BP 120s systolic per log    Restart aspirin 81mg PO Daily  Echocardiogram   Start atorvastatin 40 mg PO Daily secondary to elevated ASCVD risk score - Educated on risks/benefits of medication     Continue other cardiac  medications  Mediterranean Diet/Cardiovascular Exercise Program    Patient queried and all questions were answered.    F/u in 6 months with lipid panel prior to reassess      Signed:    Sukhjinder Pickering MD  6/19/2020 9:04 AM

## 2020-06-19 NOTE — LETTER
June 19, 2020      Jeana Carpenter MD  [address]  [facility] [address]           [facility]  [address] [place] [address]  Phone: [phone]          Patient: Mindi Agudelo   MR Number: [id number]   YOB: 1950   Date of Visit: 6/19/2020       Dear Dr. Jeana Carpenter:    Thank you for referring Mindi Agudelo to me for evaluation. Attached you will find relevant portions of my assessment and plan of care.    If you have questions, please do not hesitate to call me. I look forward to following Mindi Agudelo along with you.    Sincerely,    Sukhjinder Pickering MD    Enclosure  CC:  No Recipients    If you would like to receive this communication electronically, please contact [email or web address] or [phone] to request more information on [employer] access.    For providers and/or their staff who would like to refer a patient to [facility], please contact us through our one-stop-shop provider referral line, [facility], at [phone].    If you feel you have received this communication in error or would no longer like to receive these types of communications, please e-mail [email or web address]

## 2020-06-22 ENCOUNTER — PATIENT MESSAGE (OUTPATIENT)
Dept: CARDIOLOGY | Facility: CLINIC | Age: 70
End: 2020-06-22

## 2020-06-23 ENCOUNTER — PATIENT MESSAGE (OUTPATIENT)
Dept: CARDIOLOGY | Facility: CLINIC | Age: 70
End: 2020-06-23

## 2020-06-24 ENCOUNTER — DOCUMENTATION ONLY (OUTPATIENT)
Dept: REHABILITATION | Facility: HOSPITAL | Age: 70
End: 2020-06-24

## 2020-06-24 NOTE — PROGRESS NOTES
Outpatient Therapy Discharge Summary     Name: Mindi Agudelo  Clinic Number: [id number]    Therapy Diagnosis: Gait difficulty, Decreased range of motion of left knee, Acute pain of left knee  Physician: Teodoro Gutiérrez MD    Physician Orders: PT Eval and Treat  Medical Diagnosis from Referral: Closed nondisplaced fracture of distal epiphysis of left femur, initial encounter   Evaluation Date: 2/4/2020      Date of Last visit: 04/14/2020  Total Visits Received: 12  Cancelled Visits: 2 (due to COVID)  No Show Visits: 0    Assessment    Goals:   Short-Term Goals: 6 weeks  - The patient will be independent with initial home exercise program. (Met)  - The patient will increase strength to at least 4+/5 to perform functional mobility including improvements with transfers and overall mobility. (Unable to assess)  - The patient will increase (L) knee ROM grossly from 0 degrees to 90 degrees to perform dressing and ADLs with pain < 3/10. (Unable to formally assess)      Long-Term Goals: 12 weeks  - Pt to achieve <44% limitation as measured by the FOTO to demonstrate decreased disability. - (Unable to assess)  - The patient will be independent with home exercise program and symptom management. (Met)  - The patient will be independent amb with no assistive device on all surfaces for community distances. (Met)  - The patient will increase strength to at least 5/5 to perform functional mobility including ambulation, ascending/descending stairs, and return to activity. - (Unable to assess)  - The patient will increase (L) knee ROM to 0 to 125 degrees to perform ambulation with pain < 3/10. - (Visually not met)    Discharge reason: Patient has not attended therapy since 04/14/2020. She reports she is walking independently and continuing to work on LE strengthening.     Plan   This patient is discharged from Physical Therapy    Bernabe Huerta, PT, DPT  06/24/2020

## 2020-06-29 PROCEDURE — 99454 REM MNTR PHYSIOL PARAM 16-30: CPT | Mod: PBBFAC,PN | Performed by: INTERNAL MEDICINE

## 2020-07-07 ENCOUNTER — PATIENT OUTREACH (OUTPATIENT)
Dept: ADMINISTRATIVE | Facility: OTHER | Age: 70
End: 2020-07-07

## 2020-07-07 NOTE — PROGRESS NOTES
Requested updates within Care Everywhere.  Patient's chart was reviewed for overdue MARIO topics.  Immunizations reconciled.    Orders placed:  Tasked appts:  Labs Linked:

## 2020-07-09 ENCOUNTER — OFFICE VISIT (OUTPATIENT)
Dept: ENDOCRINOLOGY | Facility: CLINIC | Age: 70
End: 2020-07-09
Payer: MEDICARE

## 2020-07-09 VITALS
DIASTOLIC BLOOD PRESSURE: 90 MMHG | HEART RATE: 66 BPM | OXYGEN SATURATION: 98 % | HEIGHT: 66 IN | BODY MASS INDEX: 19.93 KG/M2 | SYSTOLIC BLOOD PRESSURE: 152 MMHG | WEIGHT: 124 LBS

## 2020-07-09 DIAGNOSIS — I10 BENIGN ESSENTIAL HTN: ICD-10-CM

## 2020-07-09 DIAGNOSIS — E04.1 THYROID NODULE: Primary | ICD-10-CM

## 2020-07-09 PROCEDURE — 99999 PR PBB SHADOW E&M-EST. PATIENT-LVL IV: ICD-10-PCS | Mod: PBBFAC,,, | Performed by: INTERNAL MEDICINE

## 2020-07-09 PROCEDURE — 99214 OFFICE O/P EST MOD 30 MIN: CPT | Mod: PBBFAC,PO | Performed by: INTERNAL MEDICINE

## 2020-07-09 PROCEDURE — 99213 OFFICE O/P EST LOW 20 MIN: CPT | Mod: S$PBB,,, | Performed by: INTERNAL MEDICINE

## 2020-07-09 PROCEDURE — 99999 PR PBB SHADOW E&M-EST. PATIENT-LVL IV: CPT | Mod: PBBFAC,,, | Performed by: INTERNAL MEDICINE

## 2020-07-09 PROCEDURE — 99213 PR OFFICE/OUTPT VISIT, EST, LEVL III, 20-29 MIN: ICD-10-PCS | Mod: S$PBB,,, | Performed by: INTERNAL MEDICINE

## 2020-07-09 NOTE — PROGRESS NOTES
CHIEF COMPLAINT: Multinodular Goiter  69 year old being seen as a f/u. Benign left FNA 1/7/2016. States she started iodine drops. States in Digital HTN program. No difficulty swallowing. No XRT.             1/7/16  FINAL PATHOLOGIC DIAGNOSIS  Left thyroid, fine-needle aspiration:  [facility] Thyroid Cytology Category: Benign.  Benign follicular cells and some colloid are present.      PAST MEDICAL HISTORY/PAST SURGICAL HISTORY:  Reviewed in [facility]    SOCIAL HISTORY: No T/A    FAMILY HISTORY:  Father was on T4 replacement. No thyroid cancer. + DM 2.     MEDICATIONS/ALLERGIES: The patient's MedCard has been updated and reviewed.      ROS:   Constitutional: weight decreased with diet as well as exercise.   Eyes: No recent visual changes  ENT: No dysphagia  Cardiovascular: Denies current anginal symptoms  Respiratory: Denies current respiratory difficulty  Gastrointestinal: Denies recent bowel disturbances  GenitoUrinary - No dysuria  Skin: No new skin rash  Neurologic: No focal neurologic complaints  Remainder ROS negative        PE:    GENERAL: Well developed, well nourished.  NECK: Supple, trachea midline, Left nodule palpabel  CHEST: Resp even and unlabored, CTA bilateral.  CARDIAC: RRR, S1, S2 heard, no murmurs, rubs, S3, or S4      EXAMINATION:  US SOFT TISSUE HEAD NECK THYROID     CLINICAL HISTORY:  Nontoxic single thyroid nodule     TECHNIQUE:  Ultrasound of the thyroid and cervical lymph nodes was performed.     COMPARISON:  12/11/2019     FINDINGS:  The right lobe of thyroid gland measures 3.6 x 1.4 x 1.1 cm in size.  The left lobe the thyroid gland measures 5.1 x 2.8 x 2.8cm in size.  This gives an approximate volume of on the right of 3.0cc and an approximate volume on the left of 21.3 cc for a total approximate volume of 24.3 cc.     The left lobe of the thyroid gland appears predominantly replaced by a large hyperechoic nodule appearing to measure similar to on the prior examination when measured in a  similar manner a 4.2 x 2.9 x 2.4 cm.  This is wider than tall and hyperechoic, several microcalcifications may be present.  This meets criteria for fine-needle aspiration or biopsy.  This was noted to have grown on the prior examination of 12/11/2019 and repeat biopsy was suggested at that point.     Impression:     1. Dominant left thyroid mass without convincing change since 12/11/19 but enlarged since 11/05/2018, repeat biopsy or fine needle aspiration was presented as an option on 12/11/2019 and is still a viable option.  No obvious change is noted since that point however      ASSESSMENT/PLAN:  1. Thyroid Nodule- Benign Left FNA in 2016. Discussed again FNA due to size as well as previous growth. No obstructive symptoms.     FOLLOWUP  Left Thyroid FNA

## 2020-07-14 ENCOUNTER — PATIENT MESSAGE (OUTPATIENT)
Dept: CARDIOLOGY | Facility: CLINIC | Age: 70
End: 2020-07-14

## 2020-07-19 ENCOUNTER — PATIENT OUTREACH (OUTPATIENT)
Dept: ADMINISTRATIVE | Facility: OTHER | Age: 70
End: 2020-07-19

## 2020-07-20 ENCOUNTER — OFFICE VISIT (OUTPATIENT)
Dept: ENDOCRINOLOGY | Facility: CLINIC | Age: 70
End: 2020-07-20
Payer: MEDICARE

## 2020-07-20 DIAGNOSIS — E04.1 THYROID NODULE: Primary | ICD-10-CM

## 2020-07-20 PROCEDURE — 99999 PR PBB SHADOW E&M-EST. PATIENT-LVL III: CPT | Mod: PBBFAC,,, | Performed by: INTERNAL MEDICINE

## 2020-07-20 PROCEDURE — 88173 CYTOPATH EVAL FNA REPORT: CPT | Mod: 26,,, | Performed by: PATHOLOGY

## 2020-07-20 PROCEDURE — 99213 OFFICE O/P EST LOW 20 MIN: CPT | Mod: PBBFAC,PO | Performed by: INTERNAL MEDICINE

## 2020-07-20 PROCEDURE — 10005 FNA BX W/US GDN 1ST LES: CPT | Mod: ,,, | Performed by: INTERNAL MEDICINE

## 2020-07-20 PROCEDURE — 99999 PR PBB SHADOW E&M-EST. PATIENT-LVL III: ICD-10-PCS | Mod: PBBFAC,,, | Performed by: INTERNAL MEDICINE

## 2020-07-20 PROCEDURE — 10005 PR FINE NEEDLE ASP BIOPSY, W/US GUIDANCE, 1ST LESION: ICD-10-PCS | Mod: ,,, | Performed by: INTERNAL MEDICINE

## 2020-07-20 PROCEDURE — 88173 CYTOPATH EVAL FNA REPORT: CPT | Performed by: PATHOLOGY

## 2020-07-20 PROCEDURE — 88173 PR  INTERPRETATION OF FNA SMEAR: ICD-10-PCS | Mod: 26,,, | Performed by: PATHOLOGY

## 2020-07-20 NOTE — PROGRESS NOTES
Thyroid FNA under US Guidance    Indication:  Ultrasound guidance for fine needle aspiration biopsy left nodule  Procedure time out noted. Patient's name, , and location of biopsy were verified with patient. Discussed risks of procedure and risks of a non diagnostic/Indeterminate/FLUS biopsy. Discussed that molecular markers will only be sent after approval from the patient. Advised calling to determine out of pocket costs for molecular markers    Real time ultrasound was performed in 2 planes using a [email or web address] ultrasound machine and 12 MHz probe.   The left nodule was identified and described in report.  Informed consent obtained and questions answered. FNA guidance was performed using direct u/s guidance to confirm accurate needle placement.  5 aspirations were made using 25 gauge needles. Images taken of FNA as seen below. 4 Samples were submitted for cytology.  The patient tolerated the procedure well without complication.  After care instructions were provided.        Impression:  Uncomplicated FNA biopsy of left thyroid nodule under U/S guidance. 1 sample saved for potential molecular markers

## 2020-07-21 LAB — FINAL PATHOLOGIC DIAGNOSIS: NORMAL

## 2020-07-23 ENCOUNTER — TELEPHONE (OUTPATIENT)
Dept: ENDOCRINOLOGY | Facility: CLINIC | Age: 70
End: 2020-07-23

## 2020-07-23 DIAGNOSIS — E04.2 MULTINODULAR GOITER: Primary | ICD-10-CM

## 2020-07-23 NOTE — PROGRESS NOTES
Digital Medicine: Health  Follow-Up    The history is provided by the patient.             Reason for review: Blood pressure at goal        Topics Covered on Call: physical activity    Additional Follow-up details: BP at goal.   Patient reports doing well.   No changes in diet or exercise routine.     Patient reports voluntarily going to the Cardiologist to be proactive. She wanted to get a routine check up. She states that the doctor has put her on a cholesterol medication along with Asprin 81MG  Patient reports family history of heart problems. She states that an echocardiogram is scheduled for tomorrow.          Diet-Change      Dietary Improvements:Consistent healthy diet.    Dietary Indiscretions:        Additional diet details:    Physical Activity-Change  6 day(s) a week.     She added increased biking to Her physical activity routine.        Additional physical activity details: Patient's recovery is going well. She is back riding her bike.She reports that he knee is feeling good, occasional stiffness.         Medication Adherence-Medication Adherence not addressed.      Substance, Sleep, Stress-Not assessed    PLAN  Continue current diet/physical activity routine:    Patient verbalizes understanding. Patient did not express questions or concerns and patient has contact information if needed.    Explained the importance of self-monitoring and medication adherence. Encouraged the patient to communicate with their health  for lifestyle modifications to help improve or maintain a healthy lifestyle.        There are no preventive care reminders to display for this patient.    Last 5 Patient Entered Readings                                      Current 30 Day Average: 116/63     Recent Readings 7/22/2020 7/21/2020 7/20/2020 7/19/2020 7/18/2020    SBP (mmHg) 124 122 114 123 119    DBP (mmHg) 64 62 65 64 62    Pulse 49 51 55 50 54

## 2020-07-24 ENCOUNTER — CLINICAL SUPPORT (OUTPATIENT)
Dept: CARDIOLOGY | Facility: CLINIC | Age: 70
End: 2020-07-24
Attending: INTERNAL MEDICINE
Payer: MEDICARE

## 2020-07-24 ENCOUNTER — PATIENT MESSAGE (OUTPATIENT)
Dept: CARDIOLOGY | Facility: CLINIC | Age: 70
End: 2020-07-24

## 2020-07-24 VITALS — HEIGHT: 66 IN | HEART RATE: 65 BPM | WEIGHT: 124 LBS | BODY MASS INDEX: 19.93 KG/M2

## 2020-07-24 DIAGNOSIS — Z01.30 ENCOUNTER FOR EXAMINATION OF BLOOD PRESSURE WITHOUT ABNORMAL FINDINGS: ICD-10-CM

## 2020-07-24 LAB
ASCENDING AORTA: 3.23 CM
AV INDEX (PROSTH): 0.54
AV MEAN GRADIENT: 6 MMHG
AV PEAK GRADIENT: 10 MMHG
AV VALVE AREA: 2.06 CM2
AV VELOCITY RATIO: 0.61
BSA FOR ECHO PROCEDURE: 1.62 M2
CV ECHO LV RWT: 0.3 CM
DOP CALC AO PEAK VEL: 1.6 M/S
DOP CALC AO VTI: 42.91 CM
DOP CALC LVOT AREA: 3.8 CM2
DOP CALC LVOT DIAMETER: 2.2 CM
DOP CALC LVOT PEAK VEL: 0.98 M/S
DOP CALC LVOT STROKE VOLUME: 88.45 CM3
DOP CALCLVOT PEAK VEL VTI: 23.28 CM
E WAVE DECELERATION TIME: 171.81 MSEC
E/A RATIO: 1.36
E/E' RATIO: 7.58 M/S
ECHO LV POSTERIOR WALL: 0.71 CM (ref 0.6–1.1)
FRACTIONAL SHORTENING: 39 % (ref 28–44)
INTERVENTRICULAR SEPTUM: 0.64 CM (ref 0.6–1.1)
IVRT: 102.76 MSEC
LA MAJOR: 4.95 CM
LA MINOR: 5.55 CM
LA WIDTH: 3.44 CM
LEFT ATRIUM SIZE: 2.63 CM
LEFT ATRIUM VOLUME INDEX: 24.7 ML/M2
LEFT ATRIUM VOLUME: 40.24 CM3
LEFT INTERNAL DIMENSION IN SYSTOLE: 2.88 CM (ref 2.1–4)
LEFT VENTRICLE DIASTOLIC VOLUME INDEX: 62.87 ML/M2
LEFT VENTRICLE DIASTOLIC VOLUME: 102.63 ML
LEFT VENTRICLE MASS INDEX: 61 G/M2
LEFT VENTRICLE SYSTOLIC VOLUME INDEX: 19.3 ML/M2
LEFT VENTRICLE SYSTOLIC VOLUME: 31.55 ML
LEFT VENTRICULAR INTERNAL DIMENSION IN DIASTOLE: 4.71 CM (ref 3.5–6)
LEFT VENTRICULAR MASS: 98.82 G
LV LATERAL E/E' RATIO: 7.2 M/S
LV SEPTAL E/E' RATIO: 8 M/S
MV PEAK A VEL: 0.53 M/S
MV PEAK E VEL: 0.72 M/S
MV STENOSIS PRESSURE HALF TIME: 49.83 MS
MV VALVE AREA P 1/2 METHOD: 4.42 CM2
PISA MRMAX VEL: 0.05 M/S
PISA TR MAX VEL: 2.66 M/S
RA MAJOR: 4.83 CM
RA PRESSURE: 3 MMHG
RA WIDTH: 3.25 CM
RIGHT VENTRICULAR END-DIASTOLIC DIMENSION: 3.65 CM
SINUS: 3.47 CM
STJ: 2.37 CM
TDI LATERAL: 0.1 M/S
TDI SEPTAL: 0.09 M/S
TDI: 0.1 M/S
TR MAX PG: 28 MMHG
TRICUSPID ANNULAR PLANE SYSTOLIC EXCURSION: 2.51 CM
TV REST PULMONARY ARTERY PRESSURE: 31 MMHG

## 2020-07-24 PROCEDURE — 93306 ECHO (CUPID ONLY): ICD-10-PCS | Mod: 26,S$PBB,, | Performed by: INTERNAL MEDICINE

## 2020-07-24 PROCEDURE — 99212 OFFICE O/P EST SF 10 MIN: CPT | Mod: PBBFAC,PO

## 2020-07-24 PROCEDURE — 99999 PR PBB SHADOW E&M-EST. PATIENT-LVL II: ICD-10-PCS | Mod: PBBFAC,,,

## 2020-07-24 PROCEDURE — 93306 TTE W/DOPPLER COMPLETE: CPT | Mod: PBBFAC,PO | Performed by: INTERNAL MEDICINE

## 2020-07-24 PROCEDURE — 99999 PR PBB SHADOW E&M-EST. PATIENT-LVL II: CPT | Mod: PBBFAC,,,

## 2020-07-29 ENCOUNTER — OFFICE VISIT (OUTPATIENT)
Dept: DERMATOLOGY | Facility: CLINIC | Age: 70
End: 2020-07-29
Payer: MEDICARE

## 2020-07-29 VITALS — WEIGHT: 124 LBS | BODY MASS INDEX: 19.93 KG/M2 | HEIGHT: 66 IN

## 2020-07-29 DIAGNOSIS — D48.5 NEOPLASM OF UNCERTAIN BEHAVIOR OF SKIN: Primary | ICD-10-CM

## 2020-07-29 PROCEDURE — 99499 UNLISTED E&M SERVICE: CPT | Mod: S$PBB,,, | Performed by: DERMATOLOGY

## 2020-07-29 PROCEDURE — 99999 PR PBB SHADOW E&M-EST. PATIENT-LVL III: CPT | Mod: PBBFAC,,, | Performed by: DERMATOLOGY

## 2020-07-29 PROCEDURE — 99213 OFFICE O/P EST LOW 20 MIN: CPT | Mod: PBBFAC,PO | Performed by: DERMATOLOGY

## 2020-07-29 PROCEDURE — 11104 PUNCH BX SKIN SINGLE LESION: CPT | Mod: PBBFAC,PO | Performed by: DERMATOLOGY

## 2020-07-29 PROCEDURE — 11104 PR PUNCH BIOPSY, SKIN, SINGLE LESION: ICD-10-PCS | Mod: S$PBB,,, | Performed by: DERMATOLOGY

## 2020-07-29 PROCEDURE — 88305 TISSUE EXAM BY PATHOLOGIST: ICD-10-PCS | Mod: 26,,, | Performed by: PATHOLOGY

## 2020-07-29 PROCEDURE — 88305 TISSUE EXAM BY PATHOLOGIST: CPT | Mod: 26,,, | Performed by: PATHOLOGY

## 2020-07-29 PROCEDURE — 11104 PUNCH BX SKIN SINGLE LESION: CPT | Mod: S$PBB,,, | Performed by: DERMATOLOGY

## 2020-07-29 PROCEDURE — 99499 NO LOS: ICD-10-PCS | Mod: S$PBB,,, | Performed by: DERMATOLOGY

## 2020-07-29 PROCEDURE — 99999 PR PBB SHADOW E&M-EST. PATIENT-LVL III: ICD-10-PCS | Mod: PBBFAC,,, | Performed by: DERMATOLOGY

## 2020-07-29 PROCEDURE — 88305 TISSUE EXAM BY PATHOLOGIST: CPT | Performed by: PATHOLOGY

## 2020-07-29 NOTE — PROGRESS NOTES
Subjective:       Patient ID:  Mindi Agudelo is a 69 y.o. female who presents for   Chief Complaint   Patient presents with    Lesion     LOV 2/21/19  70 y/o F presents for evaluation of lesion on scalp x 2 months. No bleeding.  No drainage.  She tried treating with OTC Caladryl, alcohol pads-->no improvement      No h/o skin cancer  Denies family h/o melanoma;   extensive sun exposure       Review of Systems   Constitutional: Negative for fever, chills and fatigue.   Respiratory: Negative for cough and shortness of breath.    Skin: Positive for daily sunscreen use, activity-related sunscreen use and wears hat. Negative for itching and rash.        Objective:    Physical Exam   Skin:   Areas Examined (abnormalities noted in diagram):   Scalp / Hair Palpated and Inspected  Head / Face Inspection Performed                Diagram Legend     Erythematous scaling macule/papule c/w actinic keratosis       Vascular papule c/w angioma      Pigmented verrucoid papule/plaque c/w seborrheic keratosis      Yellow umbilicated papule c/w sebaceous hyperplasia      Irregularly shaped tan macule c/w lentigo     1-2 mm smooth white papules consistent with Milia      Movable subcutaneous cyst with punctum c/w epidermal inclusion cyst      Subcutaneous movable cyst c/w pilar cyst      Firm pink to brown papule c/w dermatofibroma      Pedunculated fleshy papule(s) c/w skin tag(s)      Evenly pigmented macule c/w junctional nevus     Mildly variegated pigmented, slightly irregular-bordered macule c/w mildly atypical nevus      Flesh colored to evenly pigmented papule c/w intradermal nevus       Pink pearly papule/plaque c/w basal cell carcinoma      Erythematous hyperkeratotic cursted plaque c/w SCC      Surgical scar with no sign of skin cancer recurrence      Open and closed comedones      Inflammatory papules and pustules      Verrucoid papule consistent consistent with wart     Erythematous eczematous patches and plaques      Dystrophic onycholytic nail with subungual debris c/w onychomycosis     Umbilicated papule    Erythematous-base heme-crusted tan verrucoid plaque consistent with inflamed seborrheic keratosis     Erythematous Silvery Scaling Plaque c/w Psoriasis     See annotation      Assessment / Plan:      Pathology Orders:     Normal Orders This Visit    Specimen to Pathology, Dermatology     Comments:    Number of Specimens:->1  ------------------------->-------------------------  Spec 1 Procedure:->Biopsy  Spec 1 Clinical Impression:->pilar cyst vs other  Spec 1 Source:->L parietal scalp    Questions:    Procedure Type: Dermatology and skin neoplasms    Number of Specimens: 1    ------------------------: -------------------------    Spec 1 Procedure: Biopsy    Spec 1 Clinical Impression: pilar cyst vs other    Spec 1 Source: L parietal scalp        Neoplasm of uncertain behavior of skin  -     Specimen to Pathology, Dermatology    Punch biopsy procedure note:  Punch biopsy performed after verbal consent obtained. Area marked and prepped with alcohol. Approximately 1cc of 1% lidocaine with epinephrine injected. 3 mm disposable punch used to remove lesion. Hemostasis obtained and biopsy site closed with 2 Prolene sutures. Wound care instructions reviewed with patient and handout given.           Follow up in about 2 weeks (around 8/12/2020).

## 2020-07-31 LAB
FINAL PATHOLOGIC DIAGNOSIS: NORMAL
GROSS: NORMAL
MICROSCOPIC EXAM: NORMAL

## 2020-08-06 ENCOUNTER — TELEPHONE (OUTPATIENT)
Dept: DERMATOLOGY | Facility: CLINIC | Age: 70
End: 2020-08-06

## 2020-08-10 ENCOUNTER — CLINICAL SUPPORT (OUTPATIENT)
Dept: DERMATOLOGY | Facility: CLINIC | Age: 70
End: 2020-08-10
Payer: MEDICARE

## 2020-08-10 DIAGNOSIS — Z48.02 VISIT FOR SUTURE REMOVAL: Primary | ICD-10-CM

## 2020-08-10 PROCEDURE — 99024 PR POST-OP FOLLOW-UP VISIT: ICD-10-PCS | Mod: POP,,, | Performed by: DERMATOLOGY

## 2020-08-10 PROCEDURE — 99024 POSTOP FOLLOW-UP VISIT: CPT | Mod: POP,,, | Performed by: DERMATOLOGY

## 2020-08-10 NOTE — PROGRESS NOTES
Suture Removal note:  CC: 69 y.o. female patient is here for suture removal.         HPI: Patient is s/p excision of cyst from the scalp on 7/29/2020.  Patient reports no problems.    WOUND PE:  Sutures intact.  Wound healing well.  Good approximation of skin edges.  No signs or symptoms of infection.    PLAN:  Sutures removed today.  Continue wound care.

## 2020-08-11 NOTE — PROGRESS NOTES
LVM to complete [facility] enrollment with PharmD.     Last 5 Patient Entered Readings                                      Current 30 Day Average: 117/62     Recent Readings 8/4/2020 8/3/2020 8/1/2020 7/29/2020 7/27/2020    SBP (mmHg) 104 119 122 121 116    DBP (mmHg) 61 67 67 67 59    Pulse 56 59 57 65 56        Hypertension Medications             losartan (COZAAR) 25 MG tablet TAKE 1 TABLET(25 MG) BY MOUTH EVERY DAY

## 2020-08-13 NOTE — PROGRESS NOTES
Digital Medicine: Clinician Introduction    Mindi Agudelo is a 69 y.o. female who is newly enrolled in the Digital Medicine Clinic.    The history is provided by the patient.      Review of patient's allergies indicates:  No Known Allergies    HYPERTENSION  Explained hypertension digital medicine goals including BP goal less than or equal to 130/80mmHg, improved convenience of BP management and reduced risk of heart attack, kidney failure, stroke, eye disease, dementia, and death.     Explained non-pharmacologic therapies like low salt diet and physical activity can reduce blood pressure.       Explained that we expect patient to submit several blood pressure readings per week at random times of the day, but at least 30 minutes after taking blood pressure medications. Instructed patient not to allow anyone else to use their blood pressure monitor and phone as data submitted is directly entered into medical record. Reviewed and confirmed appropriate blood pressure monitoring technique.         Reviewed signs/symptoms of hypertension (headache, changes in vision, chest pain, shortness of breath)   Reviewed signs/symptoms of hypotension (lightheaded, dizziness, weakness)     Patient's BP goal is less than or equal to 130/80. Patients BP average is 118/63 mmHg, which is at goal, per 2017 ACC/AHA Hypertension Guidelines.       Last 5 Patient Entered Readings                                      Current 30 Day Average: 118/63     Recent Readings 8/11/2020 8/4/2020 8/3/2020 8/1/2020 7/29/2020    SBP (mmHg) 128 104 119 122 121    DBP (mmHg) 69 61 67 67 67    Pulse 58 56 59 57 65              Depression Screening  Mindi Agudelo screened negative on the depression screening.     Sleep Apnea Screening    Did not address sleep apnea screening.     Medication Affordability Screening  Patient did not answer the medication affordability questionnaires.     Medication Adherence-Medication adherence was assessed.  Patient continue  taking medication as prescribed.            ASSESSMENT(S)  Patients BP average is 118/63 mmHg, which is at goal. Patient's BP goal is less than or equal to 130/80 per 2017 ACC/AHA Hypertension Guidelines.      Hypertension Plan  Continue current therapy.  Continue current diet/physical activity routine.       Addressed any questions or concerns and patient has my contact information if needed prior to next outreach. Patient verbalizes understanding.      Explained the importance of self-monitoring and medication adherence. Encouraged the patient to communicate with their health  for lifestyle modifications to help improve or maintain a healthy lifestyle.        Explained to the patient that the Digital Medicine team is not available for emergencies. Advised patient call [facility] ([phone] or [phone]) or [phone] if needed.         There are no preventive care reminders to display for this patient.    Current Medication Regimen:  Hypertension Medications             losartan (COZAAR) 25 MG tablet TAKE 1 TABLET(25 MG) BY MOUTH EVERY DAY

## 2020-08-20 ENCOUNTER — PATIENT OUTREACH (OUTPATIENT)
Dept: OTHER | Facility: OTHER | Age: 70
End: 2020-08-20

## 2020-08-23 ENCOUNTER — PATIENT OUTREACH (OUTPATIENT)
Dept: ADMINISTRATIVE | Facility: OTHER | Age: 70
End: 2020-08-23

## 2020-08-25 ENCOUNTER — OFFICE VISIT (OUTPATIENT)
Dept: DERMATOLOGY | Facility: CLINIC | Age: 70
End: 2020-08-25
Payer: MEDICARE

## 2020-08-25 ENCOUNTER — PATIENT MESSAGE (OUTPATIENT)
Dept: OTHER | Facility: OTHER | Age: 70
End: 2020-08-25

## 2020-08-25 VITALS — HEIGHT: 66 IN | BODY MASS INDEX: 20.01 KG/M2 | RESPIRATION RATE: 18 BRPM

## 2020-08-25 DIAGNOSIS — D18.01 CHERRY ANGIOMA: ICD-10-CM

## 2020-08-25 DIAGNOSIS — L81.3 CAFE AU LAIT SPOTS: ICD-10-CM

## 2020-08-25 DIAGNOSIS — D22.9 MULTIPLE BENIGN NEVI: ICD-10-CM

## 2020-08-25 DIAGNOSIS — L81.4 LENTIGINES: Primary | ICD-10-CM

## 2020-08-25 DIAGNOSIS — D23.9 PILOMATRIXOMA: ICD-10-CM

## 2020-08-25 PROCEDURE — 99999 PR PBB SHADOW E&M-EST. PATIENT-LVL II: ICD-10-PCS | Mod: PBBFAC,,, | Performed by: DERMATOLOGY

## 2020-08-25 PROCEDURE — 99212 OFFICE O/P EST SF 10 MIN: CPT | Mod: PBBFAC,PO | Performed by: DERMATOLOGY

## 2020-08-25 PROCEDURE — 99214 PR OFFICE/OUTPT VISIT, EST, LEVL IV, 30-39 MIN: ICD-10-PCS | Mod: S$PBB,,, | Performed by: DERMATOLOGY

## 2020-08-25 PROCEDURE — 99214 OFFICE O/P EST MOD 30 MIN: CPT | Mod: S$PBB,,, | Performed by: DERMATOLOGY

## 2020-08-25 PROCEDURE — 99999 PR PBB SHADOW E&M-EST. PATIENT-LVL II: CPT | Mod: PBBFAC,,, | Performed by: DERMATOLOGY

## 2020-08-25 NOTE — PROGRESS NOTES
Subjective:       Patient ID:  Mindi Agudelo is a 69 y.o. female who presents for   Chief Complaint   Patient presents with    Follow-up    Skin Check     70 y/o F presents for skin check. Last OV 7-29-20 at which time a biopsy was performed of lesion on scalp (pilomatricoma)  Pt denies any new or changing lesions .    No h/o skin cancer  Denies family h/o melanoma;   extensive sun exposure              Past Medical History:   Diagnosis Date    Aortic atherosclerosis 1/24/2020    Arthritis     Cataract     OU    GERD (gastroesophageal reflux disease)     Hemorrhoid     HTN (hypertension) 6/24/2013    Hypertension     Mild vitamin D deficiency     ANG on CPAP     Osteopenia     Thyroid disease        Review of Systems   Constitutional: Negative for fever, chills and fatigue.   Respiratory: Negative for cough and shortness of breath.    Skin: Positive for daily sunscreen use, activity-related sunscreen use and wears hat. Negative for itching and rash.        Objective:    Physical Exam   Constitutional: She appears well-developed and well-nourished.   HENT:   Mouth/Throat: Lips normal.    Eyes: Lids are normal.    Neurological: She is alert and oriented to person, place, and time.   Psychiatric: She has a normal mood and affect.   Skin:   Areas Examined (abnormalities noted in diagram):   Scalp / Hair Palpated and Inspected  Head / Face Inspection Performed  Neck Inspection Performed  Chest / Axilla Inspection Performed  Abdomen Inspection Performed  Genitals / Buttocks / Groin Inspection Performed  Back Inspection Performed  RUE Inspected  LUE Inspection Performed  RLE Inspected  LLE Inspection Performed  Nails and Digits Inspection Performed                   Diagram Legend     Erythematous scaling macule/papule c/w actinic keratosis       Vascular papule c/w angioma      Pigmented verrucoid papule/plaque c/w seborrheic keratosis      Yellow umbilicated papule c/w sebaceous hyperplasia      Irregularly  shaped tan macule c/w lentigo     1-2 mm smooth white papules consistent with Milia      Movable subcutaneous cyst with punctum c/w epidermal inclusion cyst      Subcutaneous movable cyst c/w pilar cyst      Firm pink to brown papule c/w dermatofibroma      Pedunculated fleshy papule(s) c/w skin tag(s)      Evenly pigmented macule c/w junctional nevus     Mildly variegated pigmented, slightly irregular-bordered macule c/w mildly atypical nevus      Flesh colored to evenly pigmented papule c/w intradermal nevus       Pink pearly papule/plaque c/w basal cell carcinoma      Erythematous hyperkeratotic cursted plaque c/w SCC      Surgical scar with no sign of skin cancer recurrence      Open and closed comedones      Inflammatory papules and pustules      Verrucoid papule consistent consistent with wart     Erythematous eczematous patches and plaques     Dystrophic onycholytic nail with subungual debris c/w onychomycosis     Umbilicated papule    Erythematous-base heme-crusted tan verrucoid plaque consistent with inflamed seborrheic keratosis     Erythematous Silvery Scaling Plaque c/w Psoriasis     See annotation      Assessment / Plan:        Lentigines  These are benign hyperpigmented sun induced lesions. Daily sun protection will reduce the number of new lesions.     Multiple benign nevi  Reassurance that her nevi appear benign with regular and consistent pigment pattern on dermoscopy    Cherry angioma  This is a benign vascular lesion. Reassurance given. No treatment required.     Cafe au lait spots  Reassurance    Pilomatrixoma  Benign calcified cyst           Follow up in about 1 year (around 8/25/2021).

## 2020-08-26 ENCOUNTER — PATIENT OUTREACH (OUTPATIENT)
Dept: OTHER | Facility: OTHER | Age: 70
End: 2020-08-26
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
## 2020-09-28 ENCOUNTER — PATIENT MESSAGE (OUTPATIENT)
Dept: ADMINISTRATIVE | Facility: OTHER | Age: 70
End: 2020-09-28

## 2020-10-06 ENCOUNTER — OFFICE VISIT (OUTPATIENT)
Dept: FAMILY MEDICINE | Facility: CLINIC | Age: 70
End: 2020-10-06
Payer: MEDICARE

## 2020-10-06 VITALS
OXYGEN SATURATION: 99 % | SYSTOLIC BLOOD PRESSURE: 118 MMHG | WEIGHT: 122.81 LBS | RESPIRATION RATE: 18 BRPM | BODY MASS INDEX: 19.74 KG/M2 | DIASTOLIC BLOOD PRESSURE: 65 MMHG | HEART RATE: 48 BPM | HEIGHT: 66 IN | TEMPERATURE: 99 F

## 2020-10-06 DIAGNOSIS — R11.0 NAUSEA: Primary | ICD-10-CM

## 2020-10-06 DIAGNOSIS — K64.4 INFLAMED EXTERNAL HEMORRHOID: ICD-10-CM

## 2020-10-06 DIAGNOSIS — I10 ESSENTIAL HYPERTENSION: ICD-10-CM

## 2020-10-06 DIAGNOSIS — K21.9 GASTROESOPHAGEAL REFLUX DISEASE, UNSPECIFIED WHETHER ESOPHAGITIS PRESENT: ICD-10-CM

## 2020-10-06 PROCEDURE — 99214 PR OFFICE/OUTPT VISIT, EST, LEVL IV, 30-39 MIN: ICD-10-PCS | Mod: S$PBB,,, | Performed by: INTERNAL MEDICINE

## 2020-10-06 PROCEDURE — 99999 PR PBB SHADOW E&M-EST. PATIENT-LVL IV: CPT | Mod: PBBFAC,,, | Performed by: INTERNAL MEDICINE

## 2020-10-06 PROCEDURE — 99214 OFFICE O/P EST MOD 30 MIN: CPT | Mod: PBBFAC,PN | Performed by: INTERNAL MEDICINE

## 2020-10-06 PROCEDURE — 99214 OFFICE O/P EST MOD 30 MIN: CPT | Mod: S$PBB,,, | Performed by: INTERNAL MEDICINE

## 2020-10-06 PROCEDURE — 99999 PR PBB SHADOW E&M-EST. PATIENT-LVL IV: ICD-10-PCS | Mod: PBBFAC,,, | Performed by: INTERNAL MEDICINE

## 2020-10-06 RX ORDER — PANTOPRAZOLE SODIUM 20 MG/1
20 TABLET, DELAYED RELEASE ORAL DAILY
Qty: 30 TABLET | Refills: 1 | Status: SHIPPED | OUTPATIENT
Start: 2020-10-06 | End: 2020-12-07 | Stop reason: SDUPTHER

## 2020-10-06 RX ORDER — HYDROCORTISONE 25 MG/G
CREAM TOPICAL 2 TIMES DAILY
Qty: 3.5 G | Refills: 0 | Status: SHIPPED | OUTPATIENT
Start: 2020-10-06 | End: 2023-06-06

## 2020-10-06 NOTE — PROGRESS NOTES
"Assessment and Plan:    1. Nausea  - pantoprazole (PROTONIX) 20 MG tablet; Take 1 tablet (20 mg total) by mouth once daily.  Dispense: 30 tablet; Refill: 1  2. Gastroesophageal reflux disease, unspecified whether esophagitis present  - pantoprazole (PROTONIX) 20 MG tablet; Take 1 tablet (20 mg total) by mouth once daily.  Dispense: 30 tablet; Refill: 1    Discussed possible causes of AM nausea. With report of having a lot of air in her stomach from CPAP, will follow up with Dr. Baron. Trial of PPI to see if this is helpful which would indicate more GERD related. Had started ASA 81 enteric coated 3 months ago, so possibly related, though we discussed this is not a big cause.     3. Inflamed external hemorrhoid  Will let me know if symptoms not improving with topical treatment. Also discussed increased water, stool softeners, and fiber supplement.   - hydrocortisone 2.5 % cream; Apply topically 2 (two) times daily.  Dispense: 3.5 g; Refill: 0    4. Essential hypertension  Controlled at home, has known white coat HTN. Home machine verified.    ______________________________________________________________________  Subjective:    Chief Complaint:  Nausea and rectal pain    HPI:  Mindi is a 69 y.o. year old female here for evaluation of nausea and rectal pain.     She has had issues before with waking up with air in her stomach related to the CPAP. She has tried taking Gas-X or phazyme related to this, not sure if this is helping. She does not feel like the CPAP is really even helping her at this point. She has not seen sleep medicine doctor in the last 3 years (Dr. Baron). She reports that her CPAP is reporting very few apneic episodes. Reports that when she wakes up, she feels the need to belch repeatedly and it is "intense," resolves with belching and sometimes flatus.     For the last few days she has been feeling more nauseated after eating. Had tried taking zofran and pepto bismol once and it helped, but she is not " sure which helped. She had been on protonix for GERD years ago, but has not taken this in several years.     She reports that her bowel movements are typically pretty regular, usually at least one if not more soft BM per day, not diarrhea. In the last few days she has been feeling some sensation like she has to have a BM, but nothing comes out. Rectal area feeling irritated and slightly painful, and occasionally some bright red blood on toilet paper. She has known hemorrhoids.     She has been working on increasing her walking and biking recently.     She has been monitoring her BP at home and it has been 104-126/57-68. She is taking losartan 25. Has history of documented white coat HTN.     Medications:  Current Outpatient Medications on File Prior to Visit   Medication Sig Dispense Refill    acetaminophen (TYLENOL) 500 MG tablet Take 2 tablets (1,000 mg total) by mouth every 8 (eight) hours. 90 tablet 0    aspirin 81 MG Chew Take 1 tablet (81 mg total) by mouth once daily. 36 tablet 0    atorvastatin (LIPITOR) 40 MG tablet Take 1 tablet (40 mg total) by mouth once daily. 90 tablet 3    b complex vitamins tablet Take 1 tablet by mouth as needed.       CALCIUM CARBONATE/VITAMIN D3 (VITAMIN D-3 ORAL) Take 1 tablet by mouth once daily.       losartan (COZAAR) 25 MG tablet TAKE 1 TABLET(25 MG) BY MOUTH EVERY DAY 90 tablet 2    magnesium 30 mg Tab Take 500 mg by mouth once.      multivitamin (ONE DAILY MULTIVITAMIN) per tablet Take 1 tablet by mouth as needed.       mupirocin (BACTROBAN) 2 % ointment Apply topically 3 (three) times daily. 1 Tube 0    ondansetron (ZOFRAN) 8 MG tablet Take 1 tablet (8 mg total) by mouth every 8 (eight) hours as needed for Nausea. 22 tablet 0    ondansetron (ZOFRAN-ODT) 8 MG TbDL Take 1 tablet (8 mg total) by mouth every 8 (eight) hours as needed. 12 tablet 0    TURMERIC, BULK, MISC 450 mg by Misc.(Non-Drug; Combo Route) route once daily.      [DISCONTINUED]  HYDROcodone-acetaminophen (NORCO) 5-325 mg per tablet Take 1 tablet by mouth every 6 (six) hours as needed for Pain. 12 tablet 0    [DISCONTINUED] methocarbamoL (ROBAXIN) 750 MG Tab Take 1 tablet (750 mg total) by mouth 4 (four) times daily as needed. 44 tablet 0    [DISCONTINUED] oxyCODONE (ROXICODONE) 5 MG immediate release tablet Take 1 tablet (5 mg total) by mouth every 4 (four) hours as needed. 33 tablet 0    [DISCONTINUED] traMADol (ULTRAM) 50 mg tablet Take 1 tablet (50 mg total) by mouth every 4 (four) hours as needed for Pain. 44 tablet 0     Current Facility-Administered Medications on File Prior to Visit   Medication Dose Route Frequency Provider Last Rate Last Dose    electrolyte-S (ISOLYTE)   Intravenous Continuous Alonso Shaikh MD        lactated ringers infusion   Intravenous Continuous Alonso Shaikh MD 10 mL/hr at 01/28/20 0730      lidocaine (PF) 10 mg/ml (1%) injection 10 mg  1 mL Intradermal Once Alonso Shaikh MD           Review of Systems:  Review of Systems   Constitutional: Positive for activity change. Negative for unexpected weight change.   HENT: Negative for hearing loss, rhinorrhea and trouble swallowing.    Eyes: Negative for discharge and visual disturbance.   Respiratory: Negative for chest tightness and wheezing.    Cardiovascular: Negative for chest pain and palpitations.   Gastrointestinal: Positive for constipation, nausea and rectal pain. Negative for blood in stool, diarrhea and vomiting.   Endocrine: Positive for polyuria. Negative for polydipsia.   Genitourinary: Negative for difficulty urinating, dysuria, hematuria and menstrual problem.   Musculoskeletal: Negative for arthralgias, joint swelling and neck pain.   Neurological: Negative for headaches.   Psychiatric/Behavioral: Negative for confusion and dysphoric mood.       Past Medical History:  Past Medical History:   Diagnosis Date    Aortic atherosclerosis 1/24/2020    Arthritis     Cataract     OU    GERD  "(gastroesophageal reflux disease)     Hemorrhoid     HTN (hypertension) 6/24/2013    Hypertension     Mild vitamin D deficiency     ANG on CPAP     Osteopenia     Thyroid disease        Objective:    Vitals:  Vitals:    10/06/20 1545 10/06/20 1634 10/06/20 1635   BP: (!) 162/64 (!) 148/82 118/65   Pulse: (!) 48     Resp: 18     Temp: 98.8 °F (37.1 °C)     TempSrc: Temporal     SpO2: 99%     Weight: 55.7 kg (122 lb 12.7 oz)     Height: 5' 6" (1.676 m)     PainSc: 0-No pain         Physical Exam  Vitals signs reviewed.   Constitutional:       General: She is not in acute distress.     Appearance: She is well-developed.   Eyes:      General: No scleral icterus.  Cardiovascular:      Rate and Rhythm: Normal rate and regular rhythm.      Heart sounds: No murmur.   Pulmonary:      Effort: Pulmonary effort is normal. No respiratory distress.      Breath sounds: Normal breath sounds. No wheezing.   Abdominal:      General: Bowel sounds are normal.      Palpations: Abdomen is soft.      Tenderness: There is no abdominal tenderness. There is no guarding.   Skin:     General: Skin is warm and dry.   Neurological:      Mental Status: She is alert and oriented to person, place, and time.   Psychiatric:         Behavior: Behavior normal.         Data:  Previous procedures reviewed and pertinent for hemorrhoids on colonoscopy, NERD on EGD.      Jeana Carpenter MD  Internal Medicine    "

## 2020-10-07 ENCOUNTER — PATIENT MESSAGE (OUTPATIENT)
Dept: FAMILY MEDICINE | Facility: CLINIC | Age: 70
End: 2020-10-07

## 2020-10-21 ENCOUNTER — PES CALL (OUTPATIENT)
Dept: ADMINISTRATIVE | Facility: CLINIC | Age: 70
End: 2020-10-21

## 2020-11-11 ENCOUNTER — PATIENT MESSAGE (OUTPATIENT)
Dept: OTHER | Facility: OTHER | Age: 70
End: 2020-11-11

## 2020-11-11 NOTE — PROGRESS NOTES
"Digital Medicine: Health  Follow-Up    The history is provided by the patient.             Reason for review: Blood pressure at goal        Topics Covered on Call: physical activity and Diet    Additional Follow-up details: Patient reports feeling "up and down". She states that she was recently put back on her GERD medication. She reports her CPAP causing her to lose weight. She states that she was getting so much air in her stomach from CPAP she could not eat. She reports the pressure was too high for her apnea. She had gotten it adjusted and it has been helpful.     She questions's what could be causing these "up/down, "dragging"" feeling. She states that everything is the same in her lifestyle routine. She has not been able to go out in public, trying to continue with safety precautions.      She questions if it is anxiety. She has been self focused on trying to see what the down feeling is steaming from.     She reports going to the cardiologist a while ago. Reports her Echo and EKG came back great.   Cardiologist put her on atorvastatin and aspirin 81 MG Chew for preventative measure. She reports not taking Asprin chewables but a different form of Asprin.   She questions if it is the medication causing her to feel "down". She noticed that since she started taking the two medications she has felt different. Encouraged patient to trust her body. She is thinking about getting blood work done. Advised patient to reach out to her PCP with concerns or contact Elissa her Digital Medicine Clinician for further guidance and assistance.     She understood.   Will continue to monitor and f/u in 3-4 weeks.             Diet-Change        Intervention(s): DASH diet/sodium reduction education      Physical Activity-Change      Additional physical activity details: Trying to increase exercise.  Walked 5 miles this morning. She enjoys being outside when she can.   Indoor or outdoor bikes.      Medication Adherence-Medication " adherence was assessed.        Substance, Sleep, Stress-No change  stress-assessed  Details:anxiety   Intervention(s):    Sleep-assessed  Details:  Intervention(s):    Alcohol -not assessed  Details:  Intervention(s):    Tobacco-Not Assessed  Details:  Intervention(s):          Additional monitoring needed.  Continue current diet/physical activity routine.       Addressed patient questions and patient has my contact information if needed prior to next outreach. Patient verbalizes understanding.      Explained the importance of self-monitoring and medication adherence. Encouraged the patient to communicate with their health  for lifestyle modifications to help improve or maintain a healthy lifestyle.               There are no preventive care reminders to display for this patient.      Last 5 Patient Entered Readings                                      Current 30 Day Average: 114/63     Recent Readings 11/10/2020 11/9/2020 11/8/2020 11/6/2020 11/5/2020    SBP (mmHg) 107 106 102 121 126    DBP (mmHg) 64 62 60 65 64    Pulse 61 63 67 50 50

## 2020-11-12 ENCOUNTER — PATIENT MESSAGE (OUTPATIENT)
Dept: OTHER | Facility: OTHER | Age: 70
End: 2020-11-12
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
## 2020-12-07 ENCOUNTER — PATIENT MESSAGE (OUTPATIENT)
Dept: FAMILY MEDICINE | Facility: CLINIC | Age: 70
End: 2020-12-07

## 2020-12-07 DIAGNOSIS — K21.9 GASTROESOPHAGEAL REFLUX DISEASE, UNSPECIFIED WHETHER ESOPHAGITIS PRESENT: ICD-10-CM

## 2020-12-07 DIAGNOSIS — R11.0 NAUSEA: ICD-10-CM

## 2020-12-07 RX ORDER — PANTOPRAZOLE SODIUM 20 MG/1
20 TABLET, DELAYED RELEASE ORAL DAILY
Qty: 30 TABLET | Refills: 1 | Status: SHIPPED | OUTPATIENT
Start: 2020-12-07 | End: 2021-02-01

## 2020-12-07 NOTE — TELEPHONE ENCOUNTER
Patient Requesting Refill  LOV: 10/6/20  Last fill date:  Allergies reviewed  Medication Pended    Please see message from patient and advise?

## 2020-12-09 ENCOUNTER — PATIENT OUTREACH (OUTPATIENT)
Dept: OTHER | Facility: OTHER | Age: 70
End: 2020-12-09

## 2020-12-09 NOTE — PROGRESS NOTES
Digital Medicine: Health  Follow-Up    The history is provided by the patient.             Reason for review: Blood pressure at goal        Topics Covered on Call: physical activity and Diet    Additional Follow-up details: /64.    Patient reports doing well.   No changes in lifestyle routine.    Encouraged patient to continue with diet and exercise routine.  No questions or concerns at this time.   Will f/u as scheduled.               Diet-no change to diet    No change to diet.      Intervention(s): DASH diet/sodium reduction education      Physical Activity-no change to routine  No change to exercise routine.       Additional physical activity details: 5 mile walk today.   10-12 miles of cycling  Continues to stay active.      Medication Adherence-Medication adherence was assessed.        Substance, Sleep, Stress-No change  stress-  Details:  Intervention(s):    Sleep-  Details:  Intervention(s):    Alcohol -  Details:  Intervention(s):    Tobacco-  Details:  Intervention(s):          Additional monitoring needed.  Continue current diet/physical activity routine.       Addressed patient questions and patient has my contact information if needed prior to next outreach. Patient verbalizes understanding.      Explained the importance of self-monitoring and medication adherence. Encouraged the patient to communicate with their health  for lifestyle modifications to help improve or maintain a healthy lifestyle.               There are no preventive care reminders to display for this patient.      Last 5 Patient Entered Readings                                      Current 30 Day Average: 113/64     Recent Readings 12/9/2020 12/8/2020 12/7/2020 12/7/2020 12/4/2020    SBP (mmHg) 115 117 110 123 120    DBP (mmHg) 59 69 61 69 61    Pulse 55 64 53 51 48

## 2020-12-17 ENCOUNTER — PATIENT OUTREACH (OUTPATIENT)
Dept: ADMINISTRATIVE | Facility: OTHER | Age: 70
End: 2020-12-17

## 2020-12-21 ENCOUNTER — OFFICE VISIT (OUTPATIENT)
Dept: CARDIOLOGY | Facility: CLINIC | Age: 70
End: 2020-12-21
Payer: MEDICARE

## 2020-12-21 VITALS
HEART RATE: 71 BPM | WEIGHT: 122.38 LBS | DIASTOLIC BLOOD PRESSURE: 77 MMHG | BODY MASS INDEX: 19.67 KG/M2 | HEIGHT: 66 IN | SYSTOLIC BLOOD PRESSURE: 153 MMHG

## 2020-12-21 DIAGNOSIS — E55.9 VITAMIN D DEFICIENCY: ICD-10-CM

## 2020-12-21 DIAGNOSIS — I10 ESSENTIAL HYPERTENSION: Primary | ICD-10-CM

## 2020-12-21 DIAGNOSIS — I70.0 AORTIC ATHEROSCLEROSIS: ICD-10-CM

## 2020-12-21 PROCEDURE — 99214 OFFICE O/P EST MOD 30 MIN: CPT | Mod: S$PBB,,, | Performed by: INTERNAL MEDICINE

## 2020-12-21 PROCEDURE — 99213 OFFICE O/P EST LOW 20 MIN: CPT | Mod: PBBFAC,PO | Performed by: INTERNAL MEDICINE

## 2020-12-21 PROCEDURE — 99214 PR OFFICE/OUTPT VISIT, EST, LEVL IV, 30-39 MIN: ICD-10-PCS | Mod: S$PBB,,, | Performed by: INTERNAL MEDICINE

## 2020-12-21 PROCEDURE — 99999 PR PBB SHADOW E&M-EST. PATIENT-LVL III: ICD-10-PCS | Mod: PBBFAC,,, | Performed by: INTERNAL MEDICINE

## 2020-12-21 PROCEDURE — 99999 PR PBB SHADOW E&M-EST. PATIENT-LVL III: CPT | Mod: PBBFAC,,, | Performed by: INTERNAL MEDICINE

## 2020-12-21 NOTE — PROGRESS NOTES
Subjective:    Patient ID:  Mindi Agudelo is a 69 y.o. female who presents for follow-up of Hypertension (6 month f/u - Started Atorvastatin last office visit - doing well and denies side effects.  Pt is currently enrolled in digital htn program - review readings.  )      Problem List Items Addressed This Visit        Cardiac/Vascular    Aortic atherosclerosis    Essential hypertension - Primary          HPI    Patient was last seen on 06/19/2020 at which time she was evaluated for intracranial arterial atherosclerosis.  Echocardiogram was ordered and statin was optimized. Echocardiogram showed preserved ejection fraction without acute abnormalities.    On assessment today, the patient states that she feels well.     Walking 5 miles a day and doing indoor spinning cycle  6 days a week    No chest pain.  No shortness of breath.      5 Most Recent Digital Hypertension Readings    Last 5 Patient Entered Readings                                      Current 30 Day Average: 116/64     Recent Readings 12/20/2020 12/20/2020 12/19/2020 12/17/2020 12/14/2020    SBP (mmHg) 112 128 119 115 117    DBP (mmHg) 56 63 66 64 63    Pulse 53 54 56 54 51          Review of Systems   Constitution: Negative for decreased appetite, fever and malaise/fatigue.   Eyes: Negative for blurred vision.   Cardiovascular: Negative for chest pain, dyspnea on exertion, irregular heartbeat and leg swelling.   Respiratory: Negative for cough, hemoptysis, shortness of breath and wheezing.    Endocrine: Negative for cold intolerance and heat intolerance.   Hematologic/Lymphatic: Negative for bleeding problem.   Musculoskeletal: Negative for muscle weakness and myalgias.   Gastrointestinal: Negative for abdominal pain, constipation and diarrhea.   Genitourinary: Negative for bladder incontinence.   Neurological: Negative for dizziness and weakness.   Psychiatric/Behavioral: Negative for depression.        Objective:     Vitals:    12/21/20 1137   BP: (!)  "153/77   BP Location: Left arm   Patient Position: Sitting   BP Method: Medium (Automatic)   Pulse: 71   Weight: 55.5 kg (122 lb 5.7 oz)   Height: 5' 6" (1.676 m)        Physical Exam   Constitutional: She is oriented to person, place, and time. She appears well-developed and well-nourished. No distress.   HENT:   Head: Normocephalic and atraumatic.   Neck: Neck supple. No JVD present.   Cardiovascular: Normal rate, regular rhythm and normal heart sounds. Exam reveals no gallop and no friction rub.   No murmur heard.  Pulmonary/Chest: Effort normal and breath sounds normal. No respiratory distress. She has no wheezes. She has no rales.   Abdominal: Soft. Bowel sounds are normal. There is no abdominal tenderness. There is no rebound and no guarding.   Musculoskeletal:         General: No tenderness or edema.   Neurological: She is alert and oriented to person, place, and time.   Skin: Skin is warm and dry.   Psychiatric: Her behavior is normal.   Nursing note and vitals reviewed.          Current Outpatient Medications on File Prior to Visit   Medication Sig    acetaminophen (TYLENOL) 500 MG tablet Take 2 tablets (1,000 mg total) by mouth every 8 (eight) hours.    aspirin 81 MG Chew Take 1 tablet (81 mg total) by mouth once daily.    atorvastatin (LIPITOR) 40 MG tablet Take 1 tablet (40 mg total) by mouth once daily.    b complex vitamins tablet Take 1 tablet by mouth as needed.     CALCIUM CARBONATE/VITAMIN D3 (VITAMIN D-3 ORAL) Take 1 tablet by mouth once daily.     hydrocortisone 2.5 % cream Apply topically 2 (two) times daily.    losartan (COZAAR) 25 MG tablet TAKE 1 TABLET(25 MG) BY MOUTH EVERY DAY    magnesium 30 mg Tab Take 500 mg by mouth once.    multivitamin (ONE DAILY MULTIVITAMIN) per tablet Take 1 tablet by mouth as needed.     mupirocin (BACTROBAN) 2 % ointment Apply topically 3 (three) times daily.    ondansetron (ZOFRAN) 8 MG tablet Take 1 tablet (8 mg total) by mouth every 8 (eight) hours " as needed for Nausea.    ondansetron (ZOFRAN-ODT) 8 MG TbDL Take 1 tablet (8 mg total) by mouth every 8 (eight) hours as needed.    pantoprazole (PROTONIX) 20 MG tablet Take 1 tablet (20 mg total) by mouth once daily.    TURMERIC, BULK, MISC 450 mg by Misc.(Non-Drug; Combo Route) route once daily.     Current Facility-Administered Medications on File Prior to Visit   Medication    electrolyte-S (ISOLYTE)    lactated ringers infusion    lidocaine (PF) 10 mg/ml (1%) injection 10 mg       Lipid Panel:   Lab Results   Component Value Date    CHOL 203 (H) 01/28/2019    HDL 85 (H) 01/28/2019    LDLCALC 110.8 01/28/2019    TRIG 36 01/28/2019    CHOLHDL 41.9 01/28/2019       The 10-year ASCVD risk score (Viktor BENAVIDEZ Jr., et al., 2013) is: 12.8%    Values used to calculate the score:      Age: 69 years      Sex: Female      Is Non- : No      Diabetic: No      Tobacco smoker: No      Systolic Blood Pressure: 153 mmHg      Is BP treated: Yes      HDL Cholesterol: 85 mg/dL      Total Cholesterol: 203 mg/dL    All pertinent labs, imaging, and EKGs reviewed.  Patient's most recent EKG tracing was personally interpreted by this provider.    Assessment:       1. Essential hypertension    2. Aortic atherosclerosis         Plan:     Symptoms OK today  BP elevated today, Digital HTN readings look great  Reviewed echocardiogram results     Continue aspirin 81 mg PO Daily  Continue atorvastatin 40 mg PO Daily  Congratulated on routine cardiovascular exercise   Lipid panel, CBC, CMP, Mag, Vit D level (at patient's request)     Continue other cardiac medications  Mediterranean Diet/Cardiovascular Exercise Program    Patient queried and all questions were answered.    F/u in 9 months to 1 year to reassess      Signed:    Sukhjinder Pickering MD  12/21/2020 7:56 AM

## 2020-12-28 ENCOUNTER — LAB VISIT (OUTPATIENT)
Dept: LAB | Facility: HOSPITAL | Age: 70
End: 2020-12-28
Attending: INTERNAL MEDICINE
Payer: MEDICARE

## 2020-12-28 DIAGNOSIS — I10 ESSENTIAL HYPERTENSION: ICD-10-CM

## 2020-12-28 DIAGNOSIS — E55.9 VITAMIN D DEFICIENCY: ICD-10-CM

## 2020-12-28 LAB
25(OH)D3+25(OH)D2 SERPL-MCNC: 43 NG/ML (ref 30–96)
ALBUMIN SERPL BCP-MCNC: 4 G/DL (ref 3.5–5.2)
ALP SERPL-CCNC: 48 U/L (ref 55–135)
ALT SERPL W/O P-5'-P-CCNC: 17 U/L (ref 10–44)
ANION GAP SERPL CALC-SCNC: 10 MMOL/L (ref 8–16)
AST SERPL-CCNC: 22 U/L (ref 10–40)
BASOPHILS # BLD AUTO: 0.02 K/UL (ref 0–0.2)
BASOPHILS NFR BLD: 0.4 % (ref 0–1.9)
BILIRUB SERPL-MCNC: 0.6 MG/DL (ref 0.1–1)
BUN SERPL-MCNC: 14 MG/DL (ref 8–23)
CALCIUM SERPL-MCNC: 9.5 MG/DL (ref 8.7–10.5)
CHLORIDE SERPL-SCNC: 102 MMOL/L (ref 95–110)
CHOLEST SERPL-MCNC: 136 MG/DL (ref 120–199)
CHOLEST/HDLC SERPL: 2 {RATIO} (ref 2–5)
CO2 SERPL-SCNC: 30 MMOL/L (ref 23–29)
CREAT SERPL-MCNC: 0.8 MG/DL (ref 0.5–1.4)
DIFFERENTIAL METHOD: ABNORMAL
EOSINOPHIL # BLD AUTO: 0.1 K/UL (ref 0–0.5)
EOSINOPHIL NFR BLD: 2 % (ref 0–8)
ERYTHROCYTE [DISTWIDTH] IN BLOOD BY AUTOMATED COUNT: 13.9 % (ref 11.5–14.5)
EST. GFR  (AFRICAN AMERICAN): >60 ML/MIN/1.73 M^2
EST. GFR  (NON AFRICAN AMERICAN): >60 ML/MIN/1.73 M^2
GLUCOSE SERPL-MCNC: 89 MG/DL (ref 70–110)
HCT VFR BLD AUTO: 44.6 % (ref 37–48.5)
HDLC SERPL-MCNC: 68 MG/DL (ref 40–75)
HDLC SERPL: 50 % (ref 20–50)
HGB BLD-MCNC: 13.5 G/DL (ref 12–16)
IMM GRANULOCYTES # BLD AUTO: 0 K/UL (ref 0–0.04)
IMM GRANULOCYTES NFR BLD AUTO: 0 % (ref 0–0.5)
LDLC SERPL CALC-MCNC: 60.8 MG/DL (ref 63–159)
LYMPHOCYTES # BLD AUTO: 2.2 K/UL (ref 1–4.8)
LYMPHOCYTES NFR BLD: 39.5 % (ref 18–48)
MAGNESIUM SERPL-MCNC: 2.1 MG/DL (ref 1.6–2.6)
MCH RBC QN AUTO: 29.2 PG (ref 27–31)
MCHC RBC AUTO-ENTMCNC: 30.3 G/DL (ref 32–36)
MCV RBC AUTO: 97 FL (ref 82–98)
MONOCYTES # BLD AUTO: 0.6 K/UL (ref 0.3–1)
MONOCYTES NFR BLD: 10.5 % (ref 4–15)
NEUTROPHILS # BLD AUTO: 2.7 K/UL (ref 1.8–7.7)
NEUTROPHILS NFR BLD: 47.6 % (ref 38–73)
NONHDLC SERPL-MCNC: 68 MG/DL
NRBC BLD-RTO: 0 /100 WBC
PLATELET # BLD AUTO: 219 K/UL (ref 150–350)
PMV BLD AUTO: 9.8 FL (ref 9.2–12.9)
POTASSIUM SERPL-SCNC: 3.9 MMOL/L (ref 3.5–5.1)
PROT SERPL-MCNC: 7.4 G/DL (ref 6–8.4)
RBC # BLD AUTO: 4.62 M/UL (ref 4–5.4)
SODIUM SERPL-SCNC: 142 MMOL/L (ref 136–145)
TRIGL SERPL-MCNC: 36 MG/DL (ref 30–150)
WBC # BLD AUTO: 5.55 K/UL (ref 3.9–12.7)

## 2020-12-28 PROCEDURE — 80061 LIPID PANEL: CPT

## 2020-12-28 PROCEDURE — 85025 COMPLETE CBC W/AUTO DIFF WBC: CPT

## 2020-12-28 PROCEDURE — 36415 COLL VENOUS BLD VENIPUNCTURE: CPT | Mod: PN

## 2020-12-28 PROCEDURE — 82306 VITAMIN D 25 HYDROXY: CPT

## 2020-12-28 PROCEDURE — 83735 ASSAY OF MAGNESIUM: CPT

## 2020-12-28 PROCEDURE — 80053 COMPREHEN METABOLIC PANEL: CPT

## 2020-12-29 ENCOUNTER — PATIENT MESSAGE (OUTPATIENT)
Dept: CARDIOLOGY | Facility: CLINIC | Age: 70
End: 2020-12-29

## 2021-02-01 DIAGNOSIS — R11.0 NAUSEA: ICD-10-CM

## 2021-02-01 DIAGNOSIS — K21.9 GASTROESOPHAGEAL REFLUX DISEASE, UNSPECIFIED WHETHER ESOPHAGITIS PRESENT: ICD-10-CM

## 2021-02-01 RX ORDER — PANTOPRAZOLE SODIUM 20 MG/1
TABLET, DELAYED RELEASE ORAL
Qty: 90 TABLET | Refills: 1 | Status: SHIPPED | OUTPATIENT
Start: 2021-02-01 | End: 2022-03-30

## 2021-02-17 ENCOUNTER — PATIENT OUTREACH (OUTPATIENT)
Dept: ADMINISTRATIVE | Facility: OTHER | Age: 71
End: 2021-02-17

## 2021-02-17 DIAGNOSIS — Z12.31 BREAST CANCER SCREENING BY MAMMOGRAM: Primary | ICD-10-CM

## 2021-02-19 ENCOUNTER — PATIENT MESSAGE (OUTPATIENT)
Dept: ADMINISTRATIVE | Facility: OTHER | Age: 71
End: 2021-02-19

## 2021-02-22 ENCOUNTER — OFFICE VISIT (OUTPATIENT)
Dept: OPTOMETRY | Facility: CLINIC | Age: 71
End: 2021-02-22
Payer: MEDICARE

## 2021-02-22 DIAGNOSIS — H52.4 HYPEROPIA WITH ASTIGMATISM AND PRESBYOPIA, BILATERAL: ICD-10-CM

## 2021-02-22 DIAGNOSIS — H25.13 NUCLEAR SCLEROSIS OF BOTH EYES: ICD-10-CM

## 2021-02-22 DIAGNOSIS — H43.813 POSTERIOR VITREOUS DETACHMENT OF BOTH EYES: Primary | ICD-10-CM

## 2021-02-22 DIAGNOSIS — H52.03 HYPEROPIA WITH ASTIGMATISM AND PRESBYOPIA, BILATERAL: ICD-10-CM

## 2021-02-22 DIAGNOSIS — Z13.5 GLAUCOMA SCREENING: ICD-10-CM

## 2021-02-22 DIAGNOSIS — H52.203 HYPEROPIA WITH ASTIGMATISM AND PRESBYOPIA, BILATERAL: ICD-10-CM

## 2021-02-22 PROCEDURE — 92014 PR EYE EXAM, EST PATIENT,COMPREHESV: ICD-10-PCS | Mod: S$PBB,,, | Performed by: OPTOMETRIST

## 2021-02-22 PROCEDURE — 99213 OFFICE O/P EST LOW 20 MIN: CPT | Mod: PBBFAC,PO | Performed by: OPTOMETRIST

## 2021-02-22 PROCEDURE — 92014 COMPRE OPH EXAM EST PT 1/>: CPT | Mod: S$PBB,,, | Performed by: OPTOMETRIST

## 2021-02-22 PROCEDURE — 99999 PR PBB SHADOW E&M-EST. PATIENT-LVL III: ICD-10-PCS | Mod: PBBFAC,,, | Performed by: OPTOMETRIST

## 2021-02-22 PROCEDURE — 92015 PR REFRACTION: ICD-10-PCS | Mod: ,,, | Performed by: OPTOMETRIST

## 2021-02-22 PROCEDURE — 92015 DETERMINE REFRACTIVE STATE: CPT | Mod: ,,, | Performed by: OPTOMETRIST

## 2021-02-22 PROCEDURE — 99999 PR PBB SHADOW E&M-EST. PATIENT-LVL III: CPT | Mod: PBBFAC,,, | Performed by: OPTOMETRIST

## 2021-03-10 ENCOUNTER — HOSPITAL ENCOUNTER (OUTPATIENT)
Dept: RADIOLOGY | Facility: HOSPITAL | Age: 71
Discharge: HOME | End: 2021-03-10
Attending: INTERNAL MEDICINE
Payer: MEDICARE

## 2021-03-10 DIAGNOSIS — Z12.31 BREAST CANCER SCREENING BY MAMMOGRAM: ICD-10-CM

## 2021-03-10 PROCEDURE — 77063 BREAST TOMOSYNTHESIS BI: CPT | Mod: 26,,, | Performed by: RADIOLOGY

## 2021-03-10 PROCEDURE — 77063 MAMMO DIGITAL SCREENING BILAT WITH TOMO: ICD-10-PCS | Mod: 26,,, | Performed by: RADIOLOGY

## 2021-03-10 PROCEDURE — 77067 SCR MAMMO BI INCL CAD: CPT | Mod: 26,,, | Performed by: RADIOLOGY

## 2021-03-10 PROCEDURE — 77067 SCR MAMMO BI INCL CAD: CPT | Mod: TC,PO

## 2021-03-10 PROCEDURE — 77067 MAMMO DIGITAL SCREENING BILAT WITH TOMO: ICD-10-PCS | Mod: 26,,, | Performed by: RADIOLOGY

## 2021-04-05 ENCOUNTER — PATIENT MESSAGE (OUTPATIENT)
Dept: FAMILY MEDICINE | Facility: CLINIC | Age: 71
End: 2021-04-05

## 2021-04-28 ENCOUNTER — PATIENT MESSAGE (OUTPATIENT)
Dept: FAMILY MEDICINE | Facility: CLINIC | Age: 71
End: 2021-04-28

## 2021-04-28 DIAGNOSIS — Z11.52 ENCOUNTER FOR SCREENING FOR COVID-19: Primary | ICD-10-CM

## 2021-04-29 ENCOUNTER — LAB VISIT (OUTPATIENT)
Dept: FAMILY MEDICINE | Facility: CLINIC | Age: 71
End: 2021-04-29
Payer: MEDICARE

## 2021-04-29 ENCOUNTER — PATIENT MESSAGE (OUTPATIENT)
Dept: FAMILY MEDICINE | Facility: CLINIC | Age: 71
End: 2021-04-29

## 2021-04-29 DIAGNOSIS — Z11.52 ENCOUNTER FOR SCREENING FOR COVID-19: ICD-10-CM

## 2021-04-29 PROCEDURE — U0003 INFECTIOUS AGENT DETECTION BY NUCLEIC ACID (DNA OR RNA); SEVERE ACUTE RESPIRATORY SYNDROME CORONAVIRUS 2 (SARS-COV-2) (CORONAVIRUS DISEASE [COVID-19]), AMPLIFIED PROBE TECHNIQUE, MAKING USE OF HIGH THROUGHPUT TECHNOLOGIES AS DESCRIBED BY CMS-2020-01-R: HCPCS | Performed by: INTERNAL MEDICINE

## 2021-04-29 PROCEDURE — U0005 INFEC AGEN DETEC AMPLI PROBE: HCPCS | Performed by: INTERNAL MEDICINE

## 2021-04-30 LAB — SARS-COV-2 RNA RESP QL NAA+PROBE: NOT DETECTED

## 2021-07-01 ENCOUNTER — HOSPITAL ENCOUNTER (OUTPATIENT)
Dept: RADIOLOGY | Facility: HOSPITAL | Age: 71
Discharge: HOME | End: 2021-07-01
Attending: INTERNAL MEDICINE
Payer: MEDICARE

## 2021-07-01 DIAGNOSIS — E04.2 MULTINODULAR GOITER: ICD-10-CM

## 2021-07-01 PROCEDURE — 76536 US EXAM OF HEAD AND NECK: CPT | Mod: 26,,, | Performed by: RADIOLOGY

## 2021-07-01 PROCEDURE — 76536 US SOFT TISSUE HEAD NECK THYROID: ICD-10-PCS | Mod: 26,,, | Performed by: RADIOLOGY

## 2021-07-01 PROCEDURE — 76536 US EXAM OF HEAD AND NECK: CPT | Mod: TC,PO

## 2021-07-17 ENCOUNTER — TELEPHONE (OUTPATIENT)
Dept: ENDOCRINOLOGY | Facility: CLINIC | Age: 71
End: 2021-07-17

## 2021-07-19 ENCOUNTER — TELEPHONE (OUTPATIENT)
Dept: ENDOCRINOLOGY | Facility: CLINIC | Age: 71
End: 2021-07-19

## 2021-07-22 ENCOUNTER — PATIENT MESSAGE (OUTPATIENT)
Dept: OTHER | Facility: OTHER | Age: 71
End: 2021-07-22

## 2021-07-28 ENCOUNTER — PATIENT MESSAGE (OUTPATIENT)
Dept: ENDOCRINOLOGY | Facility: CLINIC | Age: 71
End: 2021-07-28

## 2021-08-08 ENCOUNTER — PATIENT MESSAGE (OUTPATIENT)
Dept: ENDOCRINOLOGY | Facility: CLINIC | Age: 71
End: 2021-08-08

## 2021-08-09 ENCOUNTER — PATIENT MESSAGE (OUTPATIENT)
Dept: ENDOCRINOLOGY | Facility: CLINIC | Age: 71
End: 2021-08-09

## 2021-08-10 NOTE — TELEPHONE ENCOUNTER
Hospital Medicine History & Physical      PCP: Noel Barton MD    Date of Admission: 8/9/2021    Date of Service: Pt seen/examined on 8/9/2021    Chief Complaint:      Chief Complaint   Patient presents with    Fall     pt was walking on carpet and went to turn and fell    Hip Pain     c/o left hip/thigh pain       History Of Present Illness:   36-year-old female with past medical history of parkinsonism, hypertension, depression presented to the hospital due to left groin/thigh pain. Patient lives at home with her daughter and stated that she was walking across the carpet when she fell to the ground. She denied any loss of consciousness or prior dizziness or lightheadedness prior to the fall. She stated that she just got her fourth and lost her balance. Due to significant pain she presented to the hospital and was noted to have acute comminuted fracture of the left pubic rami lateral to the symphysis. Due to the pain and inability to ambulate due to the pain patient was admitted to the hospital for the work-up and management. Past Medical History:        Diagnosis Date    Asthma     Hypertension     Osteoarthritis     Parkinson's disease ([facility] [address]        Past Surgical History:        Procedure Laterality Date    CATARACT REMOVAL         Medications Prior to Admission:    Prior to Admission medications    Medication Sig Start Date End Date Taking?  Authorizing Provider   albuterol sulfate HFA (VENTOLIN HFA) 108 (90 Base) MCG/ACT inhaler Inhale 2 puffs into the lungs every 6 hours as needed for Wheezing   Yes Historical Provider, MD   fluticasone-salmeterol (ADVAIR) 250-50 MCG/DOSE AEPB Inhale 1 puff into the lungs every 12 hours   Yes Historical Provider, MD   tiotropium (SPIRIVA RESPIMAT) 1.25 MCG/ACT AERS inhaler Inhale 2 puffs into the lungs daily    Yes Historical Provider, MD   carbidopa-levodopa (SINEMET CR)  MG per extended release tablet Take 1 tablet by mouth nightly   Yes Lm advising pt to call back.    Historical Provider, MD   montelukast (SINGULAIR) 10 MG tablet Take 10 mg by mouth nightly   Yes Historical Provider, MD   QUEtiapine (SEROQUEL) 25 MG tablet Take 75 mg by mouth nightly   Yes Historical Provider, MD   carbidopa-levodopa (SINEMET)  MG per tablet Take 2 tablets by mouth 2 times daily Give at 0700 and 1200   Yes Historical Provider, MD   carbidopa-levodopa (SINEMET)  MG per tablet Take 1.5 tablets by mouth Daily with supper Give at 1700   Yes Historical Provider, MD   losartan (COZAAR) 25 MG tablet Take 25 mg by mouth daily   Yes Historical Provider, MD   dicyclomine (BENTYL) 20 MG tablet Take 20 mg by mouth 2 times daily   Yes Historical Provider, MD   vitamin D (CHOLECALCIFEROL) 25 MCG (1000 UT) TABS tablet Take 1,000 Units by mouth daily   Yes Historical Provider, MD   vitamin B-12 (CYANOCOBALAMIN) 100 MCG tablet Take 100 mcg by mouth daily   Yes Historical Provider, MD   polyethylene glycol (GLYCOLAX) 17 g packet Take 17 g by mouth nightly as needed for Constipation   Yes Historical Provider, MD       Allergies: Other    Social History:       reports that she has never smoked. She has never used smokeless tobacco. She reports that she does not drink alcohol and does not use drugs. Family History:  Reviewed in detail and Positive as follows:        Problem Relation Age of Onset    Other Other         TB       REVIEW OF SYSTEMS:   Positive review  noted in the HPI. All other systems reviewed and negative.     PHYSICAL EXAM:    BP (!) 147/80   Pulse 79   Temp 99.3 °F (37.4 °C) (Oral)   Resp 16   Ht 5' (1.524 m)   Wt 93 lb 11.1 oz (42.5 kg)   SpO2 94%   BMI 18.30 kg/m²   General Appearance: alert and oriented to person, place and time, well developed and well- nourished, in no acute distress, cachectic appearance  Skin: warm and dry, no rash or erythema  Head: normocephalic and atraumatic  Eyes: pupils equal, round, and reactive to light, extraocular eye movements intact, [id number]    UA:No results for input(s): NITRITE, COLORU, PHUR, LABCAST, WBCUA, RBCUA, MUCUS, TRICHOMONAS, YEAST, BACTERIA, CLARITYU, SPECGRAV, LEUKOCYTESUR, UROBILINOGEN, BILIRUBINUR, BLOODU, GLUCOSEU, KETUA, AMORPHOUS in the last 72 hours. Microbiology:  No results for input(s): LABGRAM, LABANAE, ORG, CXSURG in the last 72 hours. Nasal Culture: No results for input(s): ORG, MRSAPCR in the last 72 hours. Blood Culture: No results for input(s): BC, BLOODCULT2, ORG in the last 72 hours. Fungal Culture:   No results for input(s): FUNGSM in the last 72 hours. No results for input(s): FUNCXBLD in the last 72 hours. CSF Culture:  No results for input(s): COLORCSF, APPEARCSF, CFTUBE, CLOTCSF, WBCCSF, RBCCSF, NEUTCSF, NUMCELLSCSF, LYMPHSCSF, MONOCSF, GLUCCSF, VOLCSF in the last 72 hours. Respiratory Culture:  No results for input(s): [place] [employer] in the last 72 hours. AFB:No results for input(s): AFBSMEAR in the last 72 hours. Urine Culture  No results for input(s): LABURIN in the last 72 hours. RADIOLOGY:    CT PELVIS WO CONTRAST Additional Contrast? None   Final Result      Acute comminuted fracture of the left pubic rami lateral to the symphysis. Vertically oriented fracture of the anterior right sacrum. Chronic, grade 1 anterolisthesis of L4 on L5, and degenerative changes of the posterior elements as described in the lower lumbar spine. Degenerative arthritis of the right hip joint greater than left. No acute fracture of the hips is seen. CT HIP LEFT WO CONTRAST   Final Result      Comminuted fracture of the anteromedial left superior pubic ramus. The left hip joint itself is intact. The sacrum is not included on this exam. Additional CT imaging of the pelvis is recommended to exclude an additional fracture.             XR HIP 2-3 VW W PELVIS LEFT   Final Result      No fracture          Previous medical records personally reviewed and analyzed         PHYSICIAN CERTIFICATION    I certify that Saira Keepers is expected to be hospitalized for <2 midnights based on the following assessment and plan:    ASSESSMENT/PLAN:  Active Hospital Problems    Diagnosis Date Noted    [facility] ring [facility] [S32.810A] 08/09/2021     Mechanical fall  Acute commuted fracture of the left pubic rami   -Orthopedics has been consulted  -Pain control  -PT/OT once able    Parkinsonism  -Continue Sinemet    Hypertension  -Continue Cozaar    Depression  -Continue Seroquel      DVT Prophylaxis: Lovenox  Diet: ADULT DIET; Regular  Code Status: Full Code  PT/OT Eval Status: Consulted    Dispo -pending clinical course       Yana Felton MD  The note was completed using EMR. Every effort was made to ensure accuracy; however, inadvertent computerized transcription errors may be present. Thank you Mayur Baldwin MD for the opportunity to be involved in this patient's care. If you have any questions or concerns please feel free to contact me at [phone].

## 2021-08-14 ENCOUNTER — PATIENT OUTREACH (OUTPATIENT)
Dept: ADMINISTRATIVE | Facility: OTHER | Age: 71
End: 2021-08-14

## 2021-08-16 ENCOUNTER — PATIENT MESSAGE (OUTPATIENT)
Dept: ENDOCRINOLOGY | Facility: CLINIC | Age: 71
End: 2021-08-16

## 2021-08-16 ENCOUNTER — OFFICE VISIT (OUTPATIENT)
Dept: ENDOCRINOLOGY | Facility: CLINIC | Age: 71
End: 2021-08-16
Payer: MEDICARE

## 2021-08-16 DIAGNOSIS — E04.2 MULTINODULAR GOITER: Primary | ICD-10-CM

## 2021-08-16 PROCEDURE — 99212 OFFICE O/P EST SF 10 MIN: CPT | Mod: PBBFAC,PO | Performed by: INTERNAL MEDICINE

## 2021-08-16 PROCEDURE — 10005 PR FINE NEEDLE ASP BIOPSY, W/US GUIDANCE, 1ST LESION: ICD-10-PCS | Mod: S$PBB,,, | Performed by: INTERNAL MEDICINE

## 2021-08-16 PROCEDURE — 88173 CYTOPATH EVAL FNA REPORT: CPT | Mod: 26,,, | Performed by: PATHOLOGY

## 2021-08-16 PROCEDURE — 99999 PR PBB SHADOW E&M-EST. PATIENT-LVL II: CPT | Mod: PBBFAC,,, | Performed by: INTERNAL MEDICINE

## 2021-08-16 PROCEDURE — 10005 FNA BX W/US GDN 1ST LES: CPT | Mod: PBBFAC,PO | Performed by: INTERNAL MEDICINE

## 2021-08-16 PROCEDURE — 88173 CYTOPATH EVAL FNA REPORT: CPT | Performed by: PATHOLOGY

## 2021-08-16 PROCEDURE — 99499 NO LOS: ICD-10-PCS | Mod: S$PBB,,, | Performed by: INTERNAL MEDICINE

## 2021-08-16 PROCEDURE — 10005 FNA BX W/US GDN 1ST LES: CPT | Mod: S$PBB,,, | Performed by: INTERNAL MEDICINE

## 2021-08-16 PROCEDURE — 88173 PR  INTERPRETATION OF FNA SMEAR: ICD-10-PCS | Mod: 26,,, | Performed by: PATHOLOGY

## 2021-08-16 PROCEDURE — 99999 PR PBB SHADOW E&M-EST. PATIENT-LVL II: ICD-10-PCS | Mod: PBBFAC,,, | Performed by: INTERNAL MEDICINE

## 2021-08-16 PROCEDURE — 99499 UNLISTED E&M SERVICE: CPT | Mod: S$PBB,,, | Performed by: INTERNAL MEDICINE

## 2021-08-18 ENCOUNTER — PATIENT MESSAGE (OUTPATIENT)
Dept: ENDOCRINOLOGY | Facility: CLINIC | Age: 71
End: 2021-08-18

## 2021-08-18 LAB
FINAL PATHOLOGIC DIAGNOSIS: ABNORMAL
Lab: ABNORMAL

## 2021-09-20 ENCOUNTER — OFFICE VISIT (OUTPATIENT)
Dept: CARDIOLOGY | Facility: CLINIC | Age: 71
End: 2021-09-20
Payer: MEDICARE

## 2021-09-20 VITALS
HEIGHT: 67 IN | BODY MASS INDEX: 19.14 KG/M2 | SYSTOLIC BLOOD PRESSURE: 174 MMHG | HEART RATE: 79 BPM | DIASTOLIC BLOOD PRESSURE: 74 MMHG | WEIGHT: 121.94 LBS

## 2021-09-20 DIAGNOSIS — I10 ESSENTIAL HYPERTENSION: Primary | ICD-10-CM

## 2021-09-20 DIAGNOSIS — I70.0 AORTIC ATHEROSCLEROSIS: ICD-10-CM

## 2021-09-20 PROCEDURE — 99214 PR OFFICE/OUTPT VISIT, EST, LEVL IV, 30-39 MIN: ICD-10-PCS | Mod: S$PBB,,, | Performed by: INTERNAL MEDICINE

## 2021-09-20 PROCEDURE — 99999 PR PBB SHADOW E&M-EST. PATIENT-LVL IV: ICD-10-PCS | Mod: PBBFAC,,, | Performed by: INTERNAL MEDICINE

## 2021-09-20 PROCEDURE — 99999 PR PBB SHADOW E&M-EST. PATIENT-LVL IV: CPT | Mod: PBBFAC,,, | Performed by: INTERNAL MEDICINE

## 2021-09-20 PROCEDURE — 99214 OFFICE O/P EST MOD 30 MIN: CPT | Mod: S$PBB,,, | Performed by: INTERNAL MEDICINE

## 2021-09-20 PROCEDURE — 99214 OFFICE O/P EST MOD 30 MIN: CPT | Mod: PBBFAC,PO | Performed by: INTERNAL MEDICINE

## 2021-10-29 PROCEDURE — 99454 REM MNTR PHYSIOL PARAM 16-30: CPT | Mod: PBBFAC,PN | Performed by: INTERNAL MEDICINE

## 2022-02-28 ENCOUNTER — PATIENT MESSAGE (OUTPATIENT)
Dept: ADMINISTRATIVE | Facility: HOSPITAL | Age: 72
End: 2022-02-28
Payer: MEDICARE

## 2022-03-16 ENCOUNTER — PATIENT MESSAGE (OUTPATIENT)
Dept: ADMINISTRATIVE | Facility: HOSPITAL | Age: 72
End: 2022-03-16
Payer: MEDICARE

## 2022-03-16 ENCOUNTER — PATIENT OUTREACH (OUTPATIENT)
Dept: ADMINISTRATIVE | Facility: HOSPITAL | Age: 72
End: 2022-03-16
Payer: MEDICARE

## 2022-03-16 NOTE — PROGRESS NOTES
2022 Care Everywhere updates requested and reviewed.  Immunizations reconciled. Media reports reviewed.  Duplicate HM overrides and  orders removed.  Overdue HM topic chart audit and/or requested.  Overdue lab testing linked to upcoming lab appointments if applies.  DIS reviewed      Mammogram and DEXA      Health Maintenance Due   Topic Date Due    Shingles Vaccine (1 of 2) Never done    Pneumococcal Vaccines (Age 65+) (2 of 2 - PPSV23) 2018    DEXA Scan  10/25/2020    Influenza Vaccine (1) Never done    Mammogram  03/10/2022

## 2022-03-29 ENCOUNTER — DOCUMENTATION ONLY (OUTPATIENT)
Dept: FAMILY MEDICINE | Facility: CLINIC | Age: 72
End: 2022-03-29
Payer: MEDICARE

## 2022-03-30 ENCOUNTER — OFFICE VISIT (OUTPATIENT)
Dept: FAMILY MEDICINE | Facility: CLINIC | Age: 72
End: 2022-03-30
Payer: MEDICARE

## 2022-03-30 ENCOUNTER — PATIENT MESSAGE (OUTPATIENT)
Dept: FAMILY MEDICINE | Facility: CLINIC | Age: 72
End: 2022-03-30

## 2022-03-30 VITALS
DIASTOLIC BLOOD PRESSURE: 62 MMHG | WEIGHT: 121.81 LBS | BODY MASS INDEX: 19.58 KG/M2 | HEIGHT: 66 IN | SYSTOLIC BLOOD PRESSURE: 113 MMHG | HEART RATE: 82 BPM

## 2022-03-30 DIAGNOSIS — M85.89 OSTEOPENIA OF MULTIPLE SITES: ICD-10-CM

## 2022-03-30 DIAGNOSIS — I10 ESSENTIAL HYPERTENSION: Primary | ICD-10-CM

## 2022-03-30 DIAGNOSIS — Z78.0 ASYMPTOMATIC POSTMENOPAUSAL STATE: ICD-10-CM

## 2022-03-30 DIAGNOSIS — I70.0 AORTIC ATHEROSCLEROSIS: ICD-10-CM

## 2022-03-30 DIAGNOSIS — Z12.31 ENCOUNTER FOR SCREENING MAMMOGRAM FOR MALIGNANT NEOPLASM OF BREAST: ICD-10-CM

## 2022-03-30 PROCEDURE — 99999 PR PBB SHADOW E&M-EST. PATIENT-LVL IV: CPT | Mod: PBBFAC,,, | Performed by: INTERNAL MEDICINE

## 2022-03-30 PROCEDURE — 99214 OFFICE O/P EST MOD 30 MIN: CPT | Mod: S$PBB,,, | Performed by: INTERNAL MEDICINE

## 2022-03-30 PROCEDURE — 99214 OFFICE O/P EST MOD 30 MIN: CPT | Mod: PBBFAC,PN | Performed by: INTERNAL MEDICINE

## 2022-03-30 PROCEDURE — 99999 PR PBB SHADOW E&M-EST. PATIENT-LVL IV: ICD-10-PCS | Mod: PBBFAC,,, | Performed by: INTERNAL MEDICINE

## 2022-03-30 PROCEDURE — 99214 PR OFFICE/OUTPT VISIT, EST, LEVL IV, 30-39 MIN: ICD-10-PCS | Mod: S$PBB,,, | Performed by: INTERNAL MEDICINE

## 2022-03-30 NOTE — PROGRESS NOTES
Assessment and Plan:    1. Essential hypertension  Well controlled at home. On Digital HTN program. Continue losartan.  - Comprehensive Metabolic Panel; Future  - Lipid Panel; Future    2. Aortic atherosclerosis  Continue atorvastatin.  - Comprehensive Metabolic Panel; Future  - Lipid Panel; Future    3. Encounter for screening mammogram for malignant neoplasm of breast  - Mammo Digital Screening Bilat; Future    4. Asymptomatic postmenopausal state  - DXA Bone Density Spine And Hip; Future    5. Osteopenia of multiple sites  Discussed that if this is worsening, would recommend alendronate. Discussed possible adverse affects. Patient needs dental bone graft, so we discussed that we would want to wait until after this is healed to start alendronate.  - DXA Bone Density Spine And Hip; Future      ______________________________________________________________________  Subjective:    Chief Complaint:  Follow up chronic medical conditions.    HPI:  Mindi is a 71 y.o. year old female here to follow up chronic medical conditions.     HTN- BP has been high at some clinic visits, but controlled with home readings through digital HTN. Taking losartan 25.      ANG- Using CPAP, no problems with this.      Multinodular goiter- Seen Dr. Walsh. Had FNA in August. Normal TFTs.     Aortic atherosclerosis- On atorvastatin 40.    Osteopenia- Lower risk of fracture on DEXA from 2017. Due for recheck.    Medications:  Current Outpatient Medications on File Prior to Visit   Medication Sig Dispense Refill    aspirin 81 MG Chew Take 1 tablet (81 mg total) by mouth once daily. 36 tablet 0    atorvastatin (LIPITOR) 40 MG tablet TAKE 1 TABLET(40 MG) BY MOUTH EVERY DAY 90 tablet 3    losartan (COZAAR) 25 MG tablet TAKE 1 TABLET(25 MG) BY MOUTH EVERY DAY 90 tablet 2    magnesium 30 mg Tab Take 500 mg by mouth once.      multivitamin (THERAGRAN) per tablet Take 1 tablet by mouth as needed.       TURMERIC, BULK, MISC 450 mg by  Misc.(Non-Drug; Combo Route) route once daily.      acetaminophen (TYLENOL) 500 MG tablet Take 2 tablets (1,000 mg total) by mouth every 8 (eight) hours. 90 tablet 0    b complex vitamins tablet Take 1 tablet by mouth as needed.       CALCIUM CARBONATE/VITAMIN D3 (VITAMIN D-3 ORAL) Take 1 tablet by mouth once daily.       hydrocortisone 2.5 % cream Apply topically 2 (two) times daily. 3.5 g 0    [DISCONTINUED] mupirocin (BACTROBAN) 2 % ointment Apply topically 3 (three) times daily. 1 Tube 0    [DISCONTINUED] ondansetron (ZOFRAN) 8 MG tablet Take 1 tablet (8 mg total) by mouth every 8 (eight) hours as needed for Nausea. 22 tablet 0    [DISCONTINUED] ondansetron (ZOFRAN-ODT) 8 MG TbDL Take 1 tablet (8 mg total) by mouth every 8 (eight) hours as needed. (Patient not taking: Reported on 3/30/2022) 12 tablet 0    [DISCONTINUED] pantoprazole (PROTONIX) 20 MG tablet TAKE 1 TABLET(20 MG) BY MOUTH EVERY DAY 90 tablet 1     Current Facility-Administered Medications on File Prior to Visit   Medication Dose Route Frequency Provider Last Rate Last Admin    electrolyte-S (ISOLYTE)   Intravenous Continuous Alonso Shaikh MD        lactated ringers infusion   Intravenous Continuous Alonso Shaikh MD 10 mL/hr at 01/28/20 0730 New Bag at 01/28/20 0812    lidocaine (PF) 10 mg/ml (1%) injection 10 mg  1 mL Intradermal Once Alonso Shaikh MD           Review of Systems:  Review of Systems   Constitutional: Negative for activity change and unexpected weight change.   HENT: Negative for hearing loss, rhinorrhea and trouble swallowing.    Eyes: Negative for discharge and visual disturbance.   Respiratory: Negative for chest tightness and wheezing.    Cardiovascular: Negative for chest pain and palpitations.   Gastrointestinal: Negative for blood in stool, constipation, diarrhea and vomiting.   Endocrine: Negative for polydipsia and polyuria.   Genitourinary: Negative for difficulty urinating, dysuria, hematuria and menstrual  "problem.   Musculoskeletal: Negative for arthralgias, joint swelling and neck pain.   Neurological: Negative for weakness and headaches.   Psychiatric/Behavioral: Negative for confusion and dysphoric mood.     Entered by patient and reviewed and updated during visit      Past Medical History:  Past Medical History:   Diagnosis Date    Aortic atherosclerosis 1/24/2020    Arthritis     Cataract     OU    GERD (gastroesophageal reflux disease)     Hemorrhoid     HTN (hypertension) 6/24/2013    Hypertension     Mild vitamin D deficiency     ANG on CPAP     Osteopenia     Thyroid disease        Objective:    Vitals:  Vitals:    03/30/22 1114 03/30/22 1121   BP: (!) 160/80 113/62   Pulse: 82    Weight: 55.3 kg (121 lb 12.9 oz)    Height: 5' 6" (1.676 m)    PainSc: 0-No pain        Physical Exam  Vitals reviewed.   Constitutional:       General: She is not in acute distress.     Appearance: She is well-developed.   Eyes:      General: No scleral icterus.  Cardiovascular:      Rate and Rhythm: Normal rate and regular rhythm.      Heart sounds: No murmur heard.  Pulmonary:      Effort: Pulmonary effort is normal. No respiratory distress.      Breath sounds: Normal breath sounds. No wheezing, rhonchi or rales.   Musculoskeletal:      Right lower leg: No edema.      Left lower leg: No edema.   Skin:     General: Skin is warm and dry.   Neurological:      Mental Status: She is alert and oriented to person, place, and time.   Psychiatric:         Behavior: Behavior normal.         Data:  Osteopenia with low risk of fracture on DEXA in 2017    Jeana Carpenter MD  Internal Medicine  "

## 2022-04-01 ENCOUNTER — LAB VISIT (OUTPATIENT)
Dept: LAB | Facility: HOSPITAL | Age: 72
End: 2022-04-01
Attending: INTERNAL MEDICINE
Payer: MEDICARE

## 2022-04-01 DIAGNOSIS — I10 ESSENTIAL HYPERTENSION: ICD-10-CM

## 2022-04-01 DIAGNOSIS — I70.0 AORTIC ATHEROSCLEROSIS: ICD-10-CM

## 2022-04-01 LAB
ALBUMIN SERPL BCP-MCNC: 4 G/DL (ref 3.5–5.2)
ALP SERPL-CCNC: 44 U/L (ref 55–135)
ALT SERPL W/O P-5'-P-CCNC: 13 U/L (ref 10–44)
ANION GAP SERPL CALC-SCNC: 10 MMOL/L (ref 8–16)
AST SERPL-CCNC: 21 U/L (ref 10–40)
BILIRUB SERPL-MCNC: 0.9 MG/DL (ref 0.1–1)
BUN SERPL-MCNC: 13 MG/DL (ref 8–23)
CALCIUM SERPL-MCNC: 10.3 MG/DL (ref 8.7–10.5)
CHLORIDE SERPL-SCNC: 103 MMOL/L (ref 95–110)
CHOLEST SERPL-MCNC: 151 MG/DL (ref 120–199)
CHOLEST/HDLC SERPL: 2 {RATIO} (ref 2–5)
CO2 SERPL-SCNC: 29 MMOL/L (ref 23–29)
CREAT SERPL-MCNC: 0.8 MG/DL (ref 0.5–1.4)
EST. GFR  (AFRICAN AMERICAN): >60 ML/MIN/1.73 M^2
EST. GFR  (NON AFRICAN AMERICAN): >60 ML/MIN/1.73 M^2
GLUCOSE SERPL-MCNC: 86 MG/DL (ref 70–110)
HDLC SERPL-MCNC: 75 MG/DL (ref 40–75)
HDLC SERPL: 49.7 % (ref 20–50)
LDLC SERPL CALC-MCNC: 68.6 MG/DL (ref 63–159)
NONHDLC SERPL-MCNC: 76 MG/DL
POTASSIUM SERPL-SCNC: 4.3 MMOL/L (ref 3.5–5.1)
PROT SERPL-MCNC: 7.3 G/DL (ref 6–8.4)
SODIUM SERPL-SCNC: 142 MMOL/L (ref 136–145)
TRIGL SERPL-MCNC: 37 MG/DL (ref 30–150)

## 2022-04-01 PROCEDURE — 80053 COMPREHEN METABOLIC PANEL: CPT | Performed by: INTERNAL MEDICINE

## 2022-04-01 PROCEDURE — 80061 LIPID PANEL: CPT | Performed by: INTERNAL MEDICINE

## 2022-04-01 PROCEDURE — 36415 COLL VENOUS BLD VENIPUNCTURE: CPT | Mod: PN | Performed by: INTERNAL MEDICINE

## 2022-04-05 ENCOUNTER — PATIENT MESSAGE (OUTPATIENT)
Dept: FAMILY MEDICINE | Facility: CLINIC | Age: 72
End: 2022-04-05
Payer: MEDICARE

## 2022-04-06 ENCOUNTER — PATIENT MESSAGE (OUTPATIENT)
Dept: FAMILY MEDICINE | Facility: CLINIC | Age: 72
End: 2022-04-06
Payer: MEDICARE

## 2022-04-27 ENCOUNTER — HOSPITAL ENCOUNTER (OUTPATIENT)
Dept: RADIOLOGY | Facility: HOSPITAL | Age: 72
Discharge: HOME | End: 2022-04-27
Attending: INTERNAL MEDICINE
Payer: MEDICARE

## 2022-04-27 DIAGNOSIS — Z12.31 ENCOUNTER FOR SCREENING MAMMOGRAM FOR MALIGNANT NEOPLASM OF BREAST: ICD-10-CM

## 2022-04-27 PROCEDURE — 77063 BREAST TOMOSYNTHESIS BI: CPT | Mod: 26,,, | Performed by: RADIOLOGY

## 2022-04-27 PROCEDURE — 77067 MAMMO DIGITAL SCREENING BILAT WITH TOMO: ICD-10-PCS | Mod: 26,,, | Performed by: RADIOLOGY

## 2022-04-27 PROCEDURE — 77063 BREAST TOMOSYNTHESIS BI: CPT | Mod: TC,PO

## 2022-04-27 PROCEDURE — 77067 SCR MAMMO BI INCL CAD: CPT | Mod: 26,,, | Performed by: RADIOLOGY

## 2022-04-27 PROCEDURE — 77067 SCR MAMMO BI INCL CAD: CPT | Mod: TC,PO

## 2022-04-27 PROCEDURE — 77063 MAMMO DIGITAL SCREENING BILAT WITH TOMO: ICD-10-PCS | Mod: 26,,, | Performed by: RADIOLOGY

## 2022-05-18 ENCOUNTER — OFFICE VISIT (OUTPATIENT)
Dept: OPTOMETRY | Facility: CLINIC | Age: 72
End: 2022-05-18
Payer: MEDICARE

## 2022-05-18 DIAGNOSIS — Z13.5 GLAUCOMA SCREENING: ICD-10-CM

## 2022-05-18 DIAGNOSIS — H43.813 POSTERIOR VITREOUS DETACHMENT OF BOTH EYES: Primary | ICD-10-CM

## 2022-05-18 DIAGNOSIS — H25.13 NUCLEAR SCLEROSIS OF BOTH EYES: ICD-10-CM

## 2022-05-18 PROCEDURE — 99213 OFFICE O/P EST LOW 20 MIN: CPT | Mod: PBBFAC,PO | Performed by: OPTOMETRIST

## 2022-05-18 PROCEDURE — 92014 PR EYE EXAM, EST PATIENT,COMPREHESV: ICD-10-PCS | Mod: S$PBB,,, | Performed by: OPTOMETRIST

## 2022-05-18 PROCEDURE — 99999 PR PBB SHADOW E&M-EST. PATIENT-LVL III: CPT | Mod: PBBFAC,,, | Performed by: OPTOMETRIST

## 2022-05-18 PROCEDURE — 92014 COMPRE OPH EXAM EST PT 1/>: CPT | Mod: S$PBB,,, | Performed by: OPTOMETRIST

## 2022-05-18 PROCEDURE — 99999 PR PBB SHADOW E&M-EST. PATIENT-LVL III: ICD-10-PCS | Mod: PBBFAC,,, | Performed by: OPTOMETRIST

## 2022-05-18 NOTE — PROGRESS NOTES
HPI     Annual eye exam    Pt. States no changes in VA since last eye exam.  + some irration OU at times pt. Feels is from allergies or her c-pap  Denies FBS, scratchy, or gonzalo feeling     + floaters x's many years with no change  Denies flashes, pain or use of gtts    Last edited by Michelle Irizarry on 5/18/2022 10:45 AM. (History)        ROS     Positive for: Eyes    Negative for: Constitutional, Gastrointestinal, Neurological, Skin,   Genitourinary, Musculoskeletal, HENT, Endocrine, Cardiovascular,   Respiratory, Psychiatric, Allergic/Imm, Heme/Lymph    Last edited by JONN Chong, OD on 5/18/2022 11:21 AM. (History)        Assessment /Plan     For exam results, see Encounter Report.    Posterior vitreous detachment of both eyes    Nuclear sclerosis of both eyes    Glaucoma screening      1. RD precautions given and reviewed. Patient knows to call/ message if any further changes in symptoms occur.  2. Early vis sig NS, not ready for consult  Gave info, cautions driving, avoid night time   CE probable 2-3 yrs   3. Not suspect    Pt defers refraction  Ok continue with otc only for near     Discussed and educated patient on current findings /plan.  RTC 1 year, prn if any changes / issues

## 2022-05-18 NOTE — PATIENT INSTRUCTIONS
FLASHES / FLOATERS / POSTERIOR VITREOUS DETACHMENT    Call the clinic if you have any further changes in symptoms.  Including:  Increased numbers of floaters or flashing lights, dimness or darkness that moves through or stays constant in your vision, or any pain in the eye (s).    You may sometimes see small specks or clouds moving in your field of vision.  They are called FLOATERS.  You can often see them when looking at a plain background, like a blank wall or blue aas.  Floaters are actually tiny clumps of gel or cells inside the VITREOUS, the clear jelly-like fluid that fills the inside of your eye.    While these objects look like they are in front of your eye, they are actually floating inside.  What you see are the shadows they cast on the RETINA, the nerve layer at the back of the eye that senses light and allows you to see.      POSTERIOR VITREOUS DETACHMENT    The appearance of new floaters may be alarming.  If you suddenly develop new floaters, you should contact your eye care professional  right away.    The retina can tear if the shrinking vitreous pulls away from the wall of the eye.  This sometimes causes a small amount of bleeding in the eye that may appear as new floaters.    A torn retina is always a serious problem, since it can lead to a retinal detachment.  You should see your eye care professional as soon as possible if:    even one new floater appears suddenly;  you see sudden flashes of light;  you notice other symptoms, like the loss of side vision, or a curtain closes down in your vision        POSTERIOR VITREOUS DETACHMENT is more common for people who:    are nearsighted;  have had cataract surgery;  have had YAG laser surgery of the eye;  have had inflammation inside the eye;  are over age 60.      While some floaters may remain visible, many of them will fade over time and become less noticeable.  Even if you've had some floaters for years, you should have your eyes checked as soon as  possible if you notice new ones.    FLASHING LIGHTS    When the vitreous gel rubs or pulls on the retina, you may see what look like flashing lights or lightning streaks.  These flashes can appear off and on for several weeks or months.      Some people experience flashes of light that appear as jagged lines or heat waves in both eyes, lasting 10-20 minutes.  These flashes are caused by a spasm of blood vessels in the brain, which is called a migraine.    If a headache follows these flashes, it's called a migraine headache.  If   no headache occurs, these flashes are called Ophthalmic or Ocular Migraine.           Early Cataracts--not visually significant for surgery consultation.    What Are Cataracts?  A clear lens in the eye focuses light. This lets the eye see images sharply. With age, the lens slowly becomes cloudy. The cloudy lens is a cataract. A cataract scatters light and makes it hard for the eye to focus. Cataracts often form in both eyes. But one lens may cloud faster than the other.      The Aging of Your Lens    Your lens may cloud so slowly that you don`t notice any vision changes at first. But as the cataract gets worse, the eye has a harder time focusing. In early stages, glasses may help you see better. As the lens gets cloudier, your doctor may recommend surgery to restore your vision.

## 2022-05-18 NOTE — PROGRESS NOTES
HPI     Annual eye exam    Pt. States no changes in VA since last eye exam.  + some irration OU at times pt. Feels is from allergies or her c-pap  Denies FBS, scratchy, or gonzalo feeling     + floaters x's many years with no change  Denies flashes, pain or use of gtts    Last edited by Michelle Irizarry on 5/18/2022 10:45 AM. (History)        ROS     Positive for: Eyes    Negative for: Constitutional, Gastrointestinal, Neurological, Skin,   Genitourinary, Musculoskeletal, HENT, Endocrine, Cardiovascular,   Respiratory, Psychiatric, Allergic/Imm, Heme/Lymph    Last edited by JONN Chong, OD on 5/18/2022 11:21 AM. (History)        Assessment /Plan     For exam results, see Encounter Report.    Posterior vitreous detachment of both eyes    Nuclear sclerosis of both eyes    Glaucoma screening    Hyperopia with astigmatism and presbyopia, bilateral

## 2022-05-19 ENCOUNTER — PATIENT MESSAGE (OUTPATIENT)
Dept: OPTOMETRY | Facility: CLINIC | Age: 72
End: 2022-05-19
Payer: MEDICARE

## 2022-07-22 ENCOUNTER — OFFICE VISIT (OUTPATIENT)
Dept: DERMATOLOGY | Facility: CLINIC | Age: 72
End: 2022-07-22
Payer: MEDICARE

## 2022-07-22 VITALS — WEIGHT: 121.94 LBS | BODY MASS INDEX: 19.6 KG/M2 | RESPIRATION RATE: 18 BRPM | HEIGHT: 66 IN

## 2022-07-22 DIAGNOSIS — L81.3 CAFE AU LAIT SPOTS: ICD-10-CM

## 2022-07-22 DIAGNOSIS — D22.9 MULTIPLE BENIGN NEVI: ICD-10-CM

## 2022-07-22 DIAGNOSIS — L81.4 LENTIGINES: Primary | ICD-10-CM

## 2022-07-22 DIAGNOSIS — D18.01 CHERRY ANGIOMA: ICD-10-CM

## 2022-07-22 DIAGNOSIS — L57.0 AK (ACTINIC KERATOSIS): ICD-10-CM

## 2022-07-22 DIAGNOSIS — L72.0 MILIUM: ICD-10-CM

## 2022-07-22 DIAGNOSIS — L82.1 SK (SEBORRHEIC KERATOSIS): ICD-10-CM

## 2022-07-22 DIAGNOSIS — Z12.83 SCREENING FOR SKIN CANCER: ICD-10-CM

## 2022-07-22 PROCEDURE — 99999 PR PBB SHADOW E&M-EST. PATIENT-LVL III: CPT | Mod: PBBFAC,,, | Performed by: DERMATOLOGY

## 2022-07-22 PROCEDURE — 17000 DESTRUCT PREMALG LESION: CPT | Mod: S$PBB,,, | Performed by: DERMATOLOGY

## 2022-07-22 PROCEDURE — 17000 DESTRUCT PREMALG LESION: CPT | Mod: PBBFAC,PO | Performed by: DERMATOLOGY

## 2022-07-22 PROCEDURE — 99999 PR PBB SHADOW E&M-EST. PATIENT-LVL III: ICD-10-PCS | Mod: PBBFAC,,, | Performed by: DERMATOLOGY

## 2022-07-22 PROCEDURE — 99213 PR OFFICE/OUTPT VISIT, EST, LEVL III, 20-29 MIN: ICD-10-PCS | Mod: 25,S$PBB,, | Performed by: DERMATOLOGY

## 2022-07-22 PROCEDURE — 17000 PR DESTRUCTION(LASER SURGERY,CRYOSURGERY,CHEMOSURGERY),PREMALIGNANT LESIONS,FIRST LESION: ICD-10-PCS | Mod: S$PBB,,, | Performed by: DERMATOLOGY

## 2022-07-22 PROCEDURE — 99213 OFFICE O/P EST LOW 20 MIN: CPT | Mod: 25,S$PBB,, | Performed by: DERMATOLOGY

## 2022-07-22 PROCEDURE — 99213 OFFICE O/P EST LOW 20 MIN: CPT | Mod: PBBFAC,PO | Performed by: DERMATOLOGY

## 2022-07-22 NOTE — PATIENT INSTRUCTIONS

## 2022-07-22 NOTE — PROGRESS NOTES
Subjective:       Patient ID:  Mindi Agudelo is a 71 y.o. female who presents for   Chief Complaint   Patient presents with    Skin Check     HPI  Patient present for skin check. LOV 8/25/20 by Dr. Reyes - only benign lesions noted.  C/o spot on left shoulder blade X 6 months. Pt states spot is crusty and gets irritated by her clothes.    No h/o skin cancer  Denies family h/o melanoma;   extensive sun exposure    Past Medical History:   Diagnosis Date    Aortic atherosclerosis 1/24/2020    Arthritis     Cataract     OU    GERD (gastroesophageal reflux disease)     Hemorrhoid     HTN (hypertension) 6/24/2013    Hypertension     Mild vitamin D deficiency     ANG on CPAP     Osteopenia     Thyroid disease      Review of Systems   Constitutional: Negative for fever, chills and fatigue.   Respiratory: Negative for cough and shortness of breath.    Skin: Positive for daily sunscreen use, activity-related sunscreen use and wears hat. Negative for itching and rash.        Objective:    Physical Exam   Constitutional: She appears well-developed and well-nourished.   HENT:   Mouth/Throat: Lips normal.    Eyes: Lids are normal.    Neurological: She is alert and oriented to person, place, and time.   Psychiatric: She has a normal mood and affect.   Skin:   Areas Examined (abnormalities noted in diagram):   Scalp / Hair Palpated and Inspected  Head / Face Inspection Performed  Neck Inspection Performed  Chest / Axilla Inspection Performed  Abdomen Inspection Performed  Genitals / Buttocks / Groin Inspection Performed  Back Inspection Performed  RUE Inspected  LUE Inspection Performed  RLE Inspected  LLE Inspection Performed  Nails and Digits Inspection Performed                   Diagram Legend     Erythematous scaling macule/papule c/w actinic keratosis       Vascular papule c/w angioma      Pigmented verrucoid papule/plaque c/w seborrheic keratosis      Yellow umbilicated papule c/w sebaceous hyperplasia       Irregularly shaped tan macule c/w lentigo     1-2 mm smooth white papules consistent with Milia      Movable subcutaneous cyst with punctum c/w epidermal inclusion cyst      Subcutaneous movable cyst c/w pilar cyst      Firm pink to brown papule c/w dermatofibroma      Pedunculated fleshy papule(s) c/w skin tag(s)      Evenly pigmented macule c/w junctional nevus     Mildly variegated pigmented, slightly irregular-bordered macule c/w mildly atypical nevus      Flesh colored to evenly pigmented papule c/w intradermal nevus       Pink pearly papule/plaque c/w basal cell carcinoma      Erythematous hyperkeratotic cursted plaque c/w SCC      Surgical scar with no sign of skin cancer recurrence      Open and closed comedones      Inflammatory papules and pustules      Verrucoid papule consistent consistent with wart     Erythematous eczematous patches and plaques     Dystrophic onycholytic nail with subungual debris c/w onychomycosis     Umbilicated papule    Erythematous-base heme-crusted tan verrucoid plaque consistent with inflamed seborrheic keratosis     Erythematous Silvery Scaling Plaque c/w Psoriasis     See annotation      Assessment / Plan:        AK (actinic keratosis)  - Discussed diagnosis, etiology, and precancerous nature of condition.   - Cryosurgery Procedure Note: Discussed procedure with patient/patient's guardian including risks and benefits as well as treatment alternatives. Risks of procedure include pain, itching, swelling, redness, blistering, crusting, wound formation, post-inflammatory pigmentary alteration, scar, recurrence. Verbal consent obtained. LN2 cryosurgery performed to 1 lesion(s). Patient tolerated procedure well. After-visit wound care instructions reviewed and provided in writing.    - Plan to recheck in 2 months for resolution.     Lentigines  - Benign; reassured treatment not necessary.   - Recommended daily sun protection, including the use of OTC broad-spectrum sunscreen (SPF 30  or greater) and sun-protective clothing.       Multiple benign nevi  - Discussed diagnosis, etiology, and benign-nature of condition.  - Reassured; no lesions suspicious for malignancy noted on exam today.   - Recommended routine self examination of skin. Discussed the ABCDEs of melanoma and ugly duckling sign.   - Recommended daily sun protection, including the use of OTC broad-spectrum sunscreen (SPF 30 or greater) and sun-protective clothing.      SK (seborrheic keratosis)  Cherry angioma  Cafe au lait spots  Milium  - Benign; reassured treatment not necessary.      Screening for skin cancer  - Total body skin examination performed today.  - Findings listed above.   - Recommended routine self examination of skin.    - Recommended daily sun protection, including the use of OTC broad-spectrum sunscreen (SPF 30 or greater) and sun-protective clothing.          Follow up in about 2 months (around 9/22/2022) for focused visit, annual skin checks otherwise.

## 2022-09-17 ENCOUNTER — PATIENT MESSAGE (OUTPATIENT)
Dept: ADMINISTRATIVE | Facility: OTHER | Age: 72
End: 2022-09-17
Payer: MEDICARE

## 2022-09-17 ENCOUNTER — PATIENT MESSAGE (OUTPATIENT)
Dept: CARDIOLOGY | Facility: CLINIC | Age: 72
End: 2022-09-17
Payer: MEDICARE

## 2022-09-19 NOTE — PROGRESS NOTES
"Subjective:    Patient ID:  Mindi Agudelo is a 71 y.o. female who presents for follow-up of Hypertension      Problem List Items Addressed This Visit          Cardiac/Vascular    Aortic atherosclerosis - Primary    Essential hypertension       HPI    Patient was last seen on 09/20/2021 at which time she was doing okay from cardiac standpoint and exercising regularly.    On assessment today, the patient states that she feels well.    No chest pain.  No shortness of breath.    6x a week she does 6 miles walking and 10 miles on her bike   Also doing a whole ton of stairs without issues 2x a week  Also pumping Fe2+     Last 5 Patient Entered Readings                Current 30 Day Average: 115/61  Recent Readings 9/19/2022 9/17/2022 9/16/2022 9/16/2022 9/14/2022   SBP (mmHg) 109 117 115 118 117   DBP (mmHg) 58 66 66 66 59   Pulse 60 49 59 53 49                      Objective:     Vitals:    09/20/22 1158 09/20/22 1209   BP: (!) 176/80 (!) 176/80   BP Location: Left arm    Patient Position: Sitting    BP Method: Medium (Automatic)    Pulse: 75    Weight: 56 kg (123 lb 7.3 oz)    Height: 5' 6" (1.676 m)         Physical Exam  Vitals and nursing note reviewed.   Constitutional:       General: She is not in acute distress.     Appearance: She is well-developed.   HENT:      Head: Normocephalic and atraumatic.   Neck:      Vascular: No JVD.   Cardiovascular:      Rate and Rhythm: Normal rate and regular rhythm.      Heart sounds: Normal heart sounds. No murmur heard.    No friction rub. No gallop.   Pulmonary:      Effort: Pulmonary effort is normal. No respiratory distress.      Breath sounds: Normal breath sounds. No wheezing or rales.   Abdominal:      General: Bowel sounds are normal.      Palpations: Abdomen is soft.      Tenderness: There is no abdominal tenderness. There is no guarding or rebound.   Musculoskeletal:         General: No tenderness.      Cervical back: Neck supple.   Skin:     General: Skin is warm and " dry.   Neurological:      Mental Status: She is alert and oriented to person, place, and time.   Psychiatric:         Behavior: Behavior normal.           Current Outpatient Medications on File Prior to Visit   Medication Sig    acetaminophen (TYLENOL) 500 MG tablet Take 2 tablets (1,000 mg total) by mouth every 8 (eight) hours.    atorvastatin (LIPITOR) 40 MG tablet TAKE 1 TABLET(40 MG) BY MOUTH EVERY DAY    b complex vitamins tablet Take 1 tablet by mouth as needed.     CALCIUM CARBONATE/VITAMIN D3 (VITAMIN D-3 ORAL) Take 1 tablet by mouth once daily.     losartan (COZAAR) 25 MG tablet TAKE 1 TABLET(25 MG) BY MOUTH EVERY DAY    magnesium 30 mg Tab Take 500 mg by mouth once.    multivitamin (THERAGRAN) per tablet Take 1 tablet by mouth as needed.     TURMERIC, BULK, MISC 450 mg by Misc.(Non-Drug; Combo Route) route once daily.    aspirin 81 MG Chew Take 1 tablet (81 mg total) by mouth once daily.    hydrocortisone 2.5 % cream Apply topically 2 (two) times daily. (Patient not taking: Reported on 9/20/2022)     Current Facility-Administered Medications on File Prior to Visit   Medication    electrolyte-S (ISOLYTE)    lactated ringers infusion    lidocaine (PF) 10 mg/ml (1%) injection 10 mg       Lipid Panel:   Lab Results   Component Value Date    CHOL 151 04/01/2022    HDL 75 04/01/2022    LDLCALC 68.6 04/01/2022    TRIG 37 04/01/2022    CHOLHDL 49.7 04/01/2022       The 10-year ASCVD risk score (Rupesh DAVID, et al., 2019) is: 19.8%    Values used to calculate the score:      Age: 71 years      Sex: Female      Is Non- : No      Diabetic: No      Tobacco smoker: No      Systolic Blood Pressure: 176 mmHg      Is BP treated: Yes      HDL Cholesterol: 75 mg/dL      Total Cholesterol: 151 mg/dL    All pertinent labs, imaging, and EKGs reviewed.  Patient's most recent EKG tracing was personally interpreted by this provider.    Most Recent Echocardiogram Results  Results for orders placed in visit on  07/24/20    Echo Color Flow Doppler? Yes    Interpretation Summary  · Normal left ventricular systolic function. The estimated ejection fraction is 60%.  · Normal LV diastolic function.  · Normal right ventricular systolic function.  · Normal central venous pressure (3 mmHg).  · The estimated PA systolic pressure is 31 mmHg.        Most Recent EKG  Results for orders placed or performed in visit on 06/19/20   IN OFFICE EKG 12-LEAD (to [place])    Collection Time: 06/19/20  2:44 PM    Narrative    Test Reason : I10,    Vent. Rate : 064 BPM     Atrial Rate : 064 BPM     P-R Int : 146 ms          QRS Dur : 088 ms      QT Int : 408 ms       P-R-T Axes : 064 051 085 degrees     QTc Int : 420 ms    Normal sinus rhythm  Normal ECG  When compared with ECG of 11-JUL-2012 02:22,  No significant change was found  Confirmed by ARABELLA EUGENE MD ([id number]) on 6/22/2020 9:27:13 AM    Referred By: MALINA MORTON           Confirmed By:ARABELLA EUGENE MD       No valid procedures specified.   No results found for this or any previous visit.    No valid procedures specified.      Assessment:       1. Aortic atherosclerosis    2. Essential hypertension         Plan:     Symptoms OK today  BP elevated in clinic  Home BP on Digital HTN program good  Most recent echocardiogram reviewed personally     Continue aspirin 81 mg PO Daily  Continue atorvastatin 40 mg PO Daily  Reduce losartan to 12.5mg PO Daily and possible wean off in the future  Congratulated on routine cardiovascular exercise      Continue other cardiac medications  Mediterranean Diet/Cardiovascular Exercise Program    Patient queried and all questions were answered.    F/u in 1 year to reassess      Signed:    Sukhjinder Pickering MD  9/20/2022 4:37 PM

## 2022-09-20 ENCOUNTER — PATIENT MESSAGE (OUTPATIENT)
Dept: CARDIOLOGY | Facility: CLINIC | Age: 72
End: 2022-09-20

## 2022-09-20 ENCOUNTER — OFFICE VISIT (OUTPATIENT)
Dept: CARDIOLOGY | Facility: CLINIC | Age: 72
End: 2022-09-20
Payer: MEDICARE

## 2022-09-20 VITALS
DIASTOLIC BLOOD PRESSURE: 80 MMHG | WEIGHT: 123.44 LBS | BODY MASS INDEX: 19.84 KG/M2 | HEART RATE: 75 BPM | HEIGHT: 66 IN | SYSTOLIC BLOOD PRESSURE: 176 MMHG

## 2022-09-20 DIAGNOSIS — I70.0 AORTIC ATHEROSCLEROSIS: Primary | ICD-10-CM

## 2022-09-20 DIAGNOSIS — I10 ESSENTIAL HYPERTENSION: ICD-10-CM

## 2022-09-20 PROCEDURE — 99214 OFFICE O/P EST MOD 30 MIN: CPT | Mod: S$PBB,,, | Performed by: INTERNAL MEDICINE

## 2022-09-20 PROCEDURE — 99214 PR OFFICE/OUTPT VISIT, EST, LEVL IV, 30-39 MIN: ICD-10-PCS | Mod: S$PBB,,, | Performed by: INTERNAL MEDICINE

## 2022-09-20 PROCEDURE — 99213 OFFICE O/P EST LOW 20 MIN: CPT | Mod: PBBFAC,PO | Performed by: INTERNAL MEDICINE

## 2022-09-20 PROCEDURE — 99999 PR PBB SHADOW E&M-EST. PATIENT-LVL III: CPT | Mod: PBBFAC,,, | Performed by: INTERNAL MEDICINE

## 2022-09-20 PROCEDURE — 99999 PR PBB SHADOW E&M-EST. PATIENT-LVL III: ICD-10-PCS | Mod: PBBFAC,,, | Performed by: INTERNAL MEDICINE

## 2022-09-20 RX ORDER — LOSARTAN POTASSIUM 25 MG/1
12.5 TABLET ORAL DAILY
Qty: 45 TABLET | Refills: 3 | Status: SHIPPED | OUTPATIENT
Start: 2022-09-20 | End: 2023-08-28 | Stop reason: SDUPTHER

## 2022-09-21 ENCOUNTER — TELEPHONE (OUTPATIENT)
Dept: CARDIOLOGY | Facility: CLINIC | Age: 72
End: 2022-09-21
Payer: MEDICARE

## 2022-09-22 ENCOUNTER — OFFICE VISIT (OUTPATIENT)
Dept: DERMATOLOGY | Facility: CLINIC | Age: 72
End: 2022-09-22
Payer: MEDICARE

## 2022-09-22 VITALS — BODY MASS INDEX: 19.84 KG/M2 | HEIGHT: 66 IN | RESPIRATION RATE: 18 BRPM | WEIGHT: 123.44 LBS

## 2022-09-22 DIAGNOSIS — H01.131 ECZEMATOUS DERMATITIS OF UPPER EYELIDS OF BOTH EYES: Primary | ICD-10-CM

## 2022-09-22 DIAGNOSIS — L72.0 MILIUM: ICD-10-CM

## 2022-09-22 DIAGNOSIS — D48.5 NEOPLASM OF UNCERTAIN BEHAVIOR OF SKIN: ICD-10-CM

## 2022-09-22 DIAGNOSIS — H01.134 ECZEMATOUS DERMATITIS OF UPPER EYELIDS OF BOTH EYES: Primary | ICD-10-CM

## 2022-09-22 PROCEDURE — 99999 PR PBB SHADOW E&M-EST. PATIENT-LVL III: ICD-10-PCS | Mod: PBBFAC,,, | Performed by: DERMATOLOGY

## 2022-09-22 PROCEDURE — 88305 TISSUE EXAM BY PATHOLOGIST: CPT | Mod: 26,,, | Performed by: PATHOLOGY

## 2022-09-22 PROCEDURE — 99999 PR PBB SHADOW E&M-EST. PATIENT-LVL III: CPT | Mod: PBBFAC,,, | Performed by: DERMATOLOGY

## 2022-09-22 PROCEDURE — 11102 PR TANGENTIAL BIOPSY, SKIN, SINGLE LESION: ICD-10-PCS | Mod: S$PBB,,, | Performed by: DERMATOLOGY

## 2022-09-22 PROCEDURE — 11102 TANGNTL BX SKIN SINGLE LES: CPT | Mod: S$PBB,,, | Performed by: DERMATOLOGY

## 2022-09-22 PROCEDURE — 88305 TISSUE EXAM BY PATHOLOGIST: ICD-10-PCS | Mod: 26,,, | Performed by: PATHOLOGY

## 2022-09-22 PROCEDURE — 88305 TISSUE EXAM BY PATHOLOGIST: CPT | Performed by: PATHOLOGY

## 2022-09-22 PROCEDURE — 99214 OFFICE O/P EST MOD 30 MIN: CPT | Mod: 25,S$PBB,, | Performed by: DERMATOLOGY

## 2022-09-22 PROCEDURE — 99214 PR OFFICE/OUTPT VISIT, EST, LEVL IV, 30-39 MIN: ICD-10-PCS | Mod: 25,S$PBB,, | Performed by: DERMATOLOGY

## 2022-09-22 PROCEDURE — 99213 OFFICE O/P EST LOW 20 MIN: CPT | Mod: PBBFAC,PO,25 | Performed by: DERMATOLOGY

## 2022-09-22 PROCEDURE — 11102 TANGNTL BX SKIN SINGLE LES: CPT | Mod: PBBFAC,PO | Performed by: DERMATOLOGY

## 2022-09-22 RX ORDER — TACROLIMUS 1 MG/G
OINTMENT TOPICAL 2 TIMES DAILY PRN
Qty: 30 G | Refills: 3 | Status: SHIPPED | OUTPATIENT
Start: 2022-09-22 | End: 2023-06-06

## 2022-09-22 NOTE — PROGRESS NOTES
Subjective:       Patient ID:  Mindi Agudelo is a 71 y.o. female who presents for   Chief Complaint   Patient presents with    Follow-up    Lesion     HPI  Established patient.  LV ~2 months ago tbse - suspected AK treated at nose, otherwise only benign lesions noted.  Also requesting milium be removed at chin, new issue dry itching eyelids.   No further complaints today.     No h/o skin cancer  Denies family h/o melanoma;   extensive sun exposure    Past Medical History:   Diagnosis Date    Aortic atherosclerosis 1/24/2020    Arthritis     Cataract     OU    GERD (gastroesophageal reflux disease)     Hemorrhoid     HTN (hypertension) 6/24/2013    Hypertension     Mild vitamin D deficiency     ANG on CPAP     Osteopenia     Thyroid disease      Review of Systems   Constitutional:  Negative for fever, chills and fatigue.   Respiratory:  Negative for cough and shortness of breath.    Skin:  Positive for daily sunscreen use, activity-related sunscreen use and wears hat. Negative for itching and rash.      Objective:    Physical Exam   Constitutional: She appears well-developed and well-nourished.   HENT:   Mouth/Throat: Lips normal.    Eyes: Lids are normal.    Neurological: She is alert and oriented to person, place, and time.   Psychiatric: She has a normal mood and affect.   Skin:   Areas Examined (abnormalities noted in diagram):   Head / Face Inspection Performed            Diagram Legend     Erythematous scaling macule/papule c/w actinic keratosis       Vascular papule c/w angioma      Pigmented verrucoid papule/plaque c/w seborrheic keratosis      Yellow umbilicated papule c/w sebaceous hyperplasia      Irregularly shaped tan macule c/w lentigo     1-2 mm smooth white papules consistent with Milia      Movable subcutaneous cyst with punctum c/w epidermal inclusion cyst      Subcutaneous movable cyst c/w pilar cyst      Firm pink to brown papule c/w dermatofibroma      Pedunculated fleshy papule(s) c/w skin  tag(s)      Evenly pigmented macule c/w junctional nevus     Mildly variegated pigmented, slightly irregular-bordered macule c/w mildly atypical nevus      Flesh colored to evenly pigmented papule c/w intradermal nevus       Pink pearly papule/plaque c/w basal cell carcinoma      Erythematous hyperkeratotic cursted plaque c/w SCC      Surgical scar with no sign of skin cancer recurrence      Open and closed comedones      Inflammatory papules and pustules      Verrucoid papule consistent consistent with wart     Erythematous eczematous patches and plaques     Dystrophic onycholytic nail with subungual debris c/w onychomycosis     Umbilicated papule    Erythematous-base heme-crusted tan verrucoid plaque consistent with inflamed seborrheic keratosis     Erythematous Silvery Scaling Plaque c/w Psoriasis     See annotation          Assessment / Plan:      Pathology Orders:       Normal Orders This Visit    Specimen to Pathology, Dermatology     Comments:    Number of Specimens:->1  ------------------------->-------------------------  Spec 1 Procedure:->Biopsy  Spec 1 Clinical Impression:->superior nasal dorsum  Spec 1 Source:->inflamed milium r/o bcc    Questions:    Procedure Type: Dermatology and skin neoplasms    Number of Specimens: 1    ------------------------: -------------------------    Spec 1 Procedure: Biopsy    Spec 1 Clinical Impression: superior nasal dorsum    Spec 1 Source: inflamed milium r/o bcc    Release to patient:           Neoplasm of uncertain behavior of skin  -     Specimen to Pathology, Dermatology  - Discussed diagnosis with patient and explained uncertain nature of condition, including differential DDX.   - Discussed treatment options (biopsy, close monitoring) with patient, including the risks and benefits of each. Patient opted to pursue biopsy.  - Shave Biopsy Procedure Note: Discussed procedure with patient/patient's guardian including risks and benefits as well as treatment  alternatives. Risks of procedure include pain, bleeding, infection, post-inflammatory pigmentary alteration, scar, recurrence. Patient informed that the purpose of a biopsy is sampling of condition in question rather than removal in entirety; further treatment may be necessary. Verbal consent obtained. Area to be biopsied marked and cleansed with alcohol. Local anesthesia achieved by injecting approximately 1 cc of 1% lidocaine with epinephrine. One shave biopsy performed using a double edge razor blade; specimen submitted to pathology. Hemostasis achieved with aluminum chloride. Petroleum jelly and bandage applied to wound. Patient tolerated procedure well. After-visit wound care instructions reviewed and provided in writing.     Milium  - Benign; reassured treatment not necessary.    - Extraction offered.     Eczematous dermatitis of upper eyelids of both eyes  -     tacrolimus (PROTOPIC) 0.1 % ointment; Apply topically 2 (two) times daily as needed (eyelid irritation / rash).  Dispense: 30 g; Refill: 3  - Discussed diagnosis, etiology, and treatment options.  - Counseled on gentle, dry skin care and avoidance of common allergens/irritants.    - Protopic BID PRN flares.   - Counseled on potential SE of medication(s) and instructed on use.             Follow up for pending pathology.

## 2022-09-23 NOTE — PATIENT INSTRUCTIONS
Shave Biopsy Wound Care    Your doctor has performed a shave biopsy today.  A band aid and vaseline ointment has been placed over the site.  This should remain in place for NO LONGER THAN 48 hours.  It is fine to remove the bandaid after 24 hours, if the area is no longer bleeding. It is recommended that you keep the area dry (do not wet)) for the first 24 hours.  After 24 hours, wash the area with warm soap and water and apply Vaseline jelly.  Many patients prefer to use Neosporin or Bacitracin ointment.  This is acceptable; however, know that you can develop an allergy to this medication even if you have used it safely for years.  It is important to keep the area moist.  Letting it dry out and get air slows healing time, and will worsen the scar.        If you notice increasing redness, tenderness, pain, or yellow drainage at the biopsy site, please notify your doctor.  These are signs of an infection.    If your biopsy site is bleeding, apply firm pressure for 15 minutes straight.  Repeat for another 15 minutes, if it is still bleeding.   If the surgical site continues to bleed, then please contact your doctor.      For [facility] users:   You will receive your biopsy results in [facility] as soon as they are available. Please be assured that your physician/provider will review your results and will then determine what further treatment, evaluation, or planning is required. You should be contacted by your physician's/provider's office within 5 business days of receiving your results; If not, please reach out to directly. This is one more way [facility] is putting you first.     [facility] [place] [address]/ [phone] FAX/ [email or web address]

## 2022-10-03 ENCOUNTER — PATIENT MESSAGE (OUTPATIENT)
Dept: ENDOCRINOLOGY | Facility: CLINIC | Age: 72
End: 2022-10-03
Payer: MEDICARE

## 2022-10-03 DIAGNOSIS — E04.2 MULTINODULAR GOITER: Primary | ICD-10-CM

## 2022-10-03 DIAGNOSIS — Z71.89 COMPLEX CARE COORDINATION: ICD-10-CM

## 2022-10-03 LAB
FINAL PATHOLOGIC DIAGNOSIS: NORMAL
Lab: NORMAL

## 2022-10-04 ENCOUNTER — PATIENT MESSAGE (OUTPATIENT)
Dept: DERMATOLOGY | Facility: CLINIC | Age: 72
End: 2022-10-04
Payer: MEDICARE

## 2022-10-04 ENCOUNTER — TELEPHONE (OUTPATIENT)
Dept: DERMATOLOGY | Facility: CLINIC | Age: 72
End: 2022-10-04
Payer: MEDICARE

## 2022-10-04 DIAGNOSIS — L57.0 AK (ACTINIC KERATOSIS): Primary | ICD-10-CM

## 2022-10-04 RX ORDER — FLUOROURACIL 50 MG/G
CREAM TOPICAL
Qty: 40 G | Refills: 1 | Status: SHIPPED | OUTPATIENT
Start: 2022-10-04

## 2022-10-04 NOTE — PROGRESS NOTES
SKIN, SUPERIOR NASAL DORSUM LESION, SHAVE BIOPSY:   Actinic keratosis, focally traumatized.   No malignancy identified.     Please call to discuss results / plan / schedule:   Efudex to entire nose BID x 2 weeks. Please review instructions, expected SE. Recheck in 3-6 months.

## 2022-10-04 NOTE — TELEPHONE ENCOUNTER
----- Message from Shaniqua Osman MD sent at 10/4/2022 12:50 PM CDT -----  SKIN, SUPERIOR NASAL DORSUM LESION, SHAVE BIOPSY:   Actinic keratosis, focally traumatized.   No malignancy identified.     Please call to discuss results / plan / schedule:   Efudex to entire nose BID x 2 weeks. Please review instructions, expected SE. Recheck in 3-6 months.

## 2022-10-04 NOTE — TELEPHONE ENCOUNTER
Spoke w/ pt. Informed pt about results and recommendations per provider. pt verbalized understanding.    F/u made for march 2023.

## 2022-10-06 ENCOUNTER — HOSPITAL ENCOUNTER (OUTPATIENT)
Dept: RADIOLOGY | Facility: HOSPITAL | Age: 72
Discharge: HOME | End: 2022-10-06
Attending: INTERNAL MEDICINE
Payer: MEDICARE

## 2022-10-06 DIAGNOSIS — E04.2 MULTINODULAR GOITER: ICD-10-CM

## 2022-10-06 PROCEDURE — 76536 US EXAM OF HEAD AND NECK: CPT | Mod: TC,PO

## 2022-10-06 PROCEDURE — 76536 US EXAM OF HEAD AND NECK: CPT | Mod: 26,,, | Performed by: RADIOLOGY

## 2022-10-06 PROCEDURE — 76536 US SOFT TISSUE HEAD NECK THYROID: ICD-10-PCS | Mod: 26,,, | Performed by: RADIOLOGY

## 2022-10-14 ENCOUNTER — OFFICE VISIT (OUTPATIENT)
Dept: ENDOCRINOLOGY | Facility: CLINIC | Age: 72
End: 2022-10-14
Payer: MEDICARE

## 2022-10-14 VITALS
RESPIRATION RATE: 18 BRPM | WEIGHT: 123.81 LBS | DIASTOLIC BLOOD PRESSURE: 86 MMHG | SYSTOLIC BLOOD PRESSURE: 148 MMHG | HEIGHT: 66 IN | HEART RATE: 92 BPM | BODY MASS INDEX: 19.9 KG/M2

## 2022-10-14 DIAGNOSIS — I10 BENIGN ESSENTIAL HTN: ICD-10-CM

## 2022-10-14 DIAGNOSIS — E04.2 MULTINODULAR GOITER: Primary | ICD-10-CM

## 2022-10-14 PROCEDURE — 99999 PR PBB SHADOW E&M-EST. PATIENT-LVL III: ICD-10-PCS | Mod: PBBFAC,,, | Performed by: INTERNAL MEDICINE

## 2022-10-14 PROCEDURE — 99213 OFFICE O/P EST LOW 20 MIN: CPT | Mod: PBBFAC,PN | Performed by: INTERNAL MEDICINE

## 2022-10-14 PROCEDURE — 99213 PR OFFICE/OUTPT VISIT, EST, LEVL III, 20-29 MIN: ICD-10-PCS | Mod: S$PBB,,, | Performed by: INTERNAL MEDICINE

## 2022-10-14 PROCEDURE — 99213 OFFICE O/P EST LOW 20 MIN: CPT | Mod: S$PBB,,, | Performed by: INTERNAL MEDICINE

## 2022-10-14 PROCEDURE — 99999 PR PBB SHADOW E&M-EST. PATIENT-LVL III: CPT | Mod: PBBFAC,,, | Performed by: INTERNAL MEDICINE

## 2022-10-14 NOTE — PROGRESS NOTES
CHIEF COMPLAINT: Multinodular Goiter  71 y.o. being seen as a f/u. Benign left FNA 1/7/2016. No XRT to head/neck. NO difficulty swallowing. No change voice. No palpitations. No tremors.               1/7/16  FINAL PATHOLOGIC DIAGNOSIS  Left thyroid, fine-needle aspiration:  [facility] Thyroid Cytology Category: Benign.  Benign follicular cells and some colloid are present.        PAST MEDICAL HISTORY/PAST SURGICAL HISTORY:  Reviewed in [facility]     SOCIAL HISTORY: No T/A     FAMILY HISTORY:  Father was on T4 replacement. No thyroid cancer. + DM 2.      MEDICATIONS/ALLERGIES: The patient's MedCard has been updated and reviewed.            PE:    GENERAL: Well developed, well nourished.  NECK: Supple, trachea midline, Left nodule palpabel  CHEST: Resp even and unlabored, CTA bilateral.  CARDIAC: RRR, S1, S2 heard, no murmurs, rubs, S3, or S4         Latest Reference Range & Units 10/06/22 10:06   TSH 0.400 - 4.000 uIU/mL 2.596       US SOFT TISSUE HEAD NECK THYROID     CLINICAL HISTORY:  Nontoxic multinodular goiter     TECHNIQUE:  Ultrasound of the thyroid and cervical lymph nodes was performed.     COMPARISON:  Thyroid ultrasound-07/01/2021     FINDINGS:  The right thyroid lobe measures 4.1 x 1.2 x 1.2 cm.  The left thyroid lobe measures 5.4 x 2.4 x 3.2 cm.  The total thyroid weight is 24.8 g.  There is a 4.5 x 2.4 x 3.2 cm smooth, circumscribed, slightly hyperechoic solid nodule with internal foci of cystic degeneration occupying much of the left thyroid lobe.  There are internal punctate echogenic foci present.  This nodule has apparently been previously sampled percutaneously.  There is a 5 x 3 x 5 mm smooth, circumscribed, wider than tall, hypoechoic solid nodule present along the posterior margin of the right thyroid midpole which appears unchanged in size when compared with the previous examination.  It is uncertain whether this represents an exophytic thyroid nodule or a small parathyroid adenoma.   Consider correlation with serum PTH levels.  No new thyroid nodules which meet the criteria for FNA/core biopsy.     No pathologically enlarged or morphologically abnormal lymph nodes in the left or right neck adjacent to the thyroid fossa.     Impression:     1. Multinodular goiter.  No interval detrimental change when compared to the prior study.  No new thyroid nodules which meet the criteria for FNA/core biopsy.  2. 5 mm solid hypoechoic nodule along the posterior margin of the right thyroid midpole.  A small parathyroid adenoma cannot be excluded.  No interval change when compared to the prior study.        ASSESSMENT/PLAN:  1. Thyroid Nodule- Benign Left FNA in 2016.  No XRT.  Denies any obstructive symptoms.  Ultrasound shows no significant change.  At this point can repeat in 1 year.  Monitor calcium as there is question of a possible parathyroid seen on ultrasound.     2. HTN- BP controlled. Currently in digital HTN program. States occasionally elevated during visits.         FOLLOWUP  F/U 1 yr TSH, Thyroid Ultrasound, CMP

## 2023-02-01 ENCOUNTER — PATIENT MESSAGE (OUTPATIENT)
Dept: FAMILY MEDICINE | Facility: CLINIC | Age: 73
End: 2023-02-01
Payer: MEDICARE

## 2023-02-02 ENCOUNTER — PATIENT MESSAGE (OUTPATIENT)
Dept: CARDIOLOGY | Facility: CLINIC | Age: 73
End: 2023-02-02
Payer: MEDICARE

## 2023-03-15 ENCOUNTER — OFFICE VISIT (OUTPATIENT)
Dept: DERMATOLOGY | Facility: CLINIC | Age: 73
End: 2023-03-15
Payer: MEDICARE

## 2023-03-15 VITALS — BODY MASS INDEX: 19.77 KG/M2 | WEIGHT: 123 LBS | HEIGHT: 66 IN

## 2023-03-15 DIAGNOSIS — L57.8 ACTINIC SKIN DAMAGE: Primary | ICD-10-CM

## 2023-03-15 DIAGNOSIS — L81.4 LENTIGINES: ICD-10-CM

## 2023-03-15 DIAGNOSIS — L57.0 AK (ACTINIC KERATOSIS): ICD-10-CM

## 2023-03-15 PROCEDURE — 17000 DESTRUCT PREMALG LESION: CPT | Mod: PBBFAC,PO | Performed by: DERMATOLOGY

## 2023-03-15 PROCEDURE — 99214 OFFICE O/P EST MOD 30 MIN: CPT | Mod: 25,S$PBB,, | Performed by: DERMATOLOGY

## 2023-03-15 PROCEDURE — 99214 PR OFFICE/OUTPT VISIT, EST, LEVL IV, 30-39 MIN: ICD-10-PCS | Mod: 25,S$PBB,, | Performed by: DERMATOLOGY

## 2023-03-15 PROCEDURE — 99213 OFFICE O/P EST LOW 20 MIN: CPT | Mod: PBBFAC,PO | Performed by: DERMATOLOGY

## 2023-03-15 PROCEDURE — 17000 PR DESTRUCTION(LASER SURGERY,CRYOSURGERY,CHEMOSURGERY),PREMALIGNANT LESIONS,FIRST LESION: ICD-10-PCS | Mod: S$PBB,,, | Performed by: DERMATOLOGY

## 2023-03-15 PROCEDURE — 99999 PR PBB SHADOW E&M-EST. PATIENT-LVL III: CPT | Mod: PBBFAC,,, | Performed by: DERMATOLOGY

## 2023-03-15 PROCEDURE — 17000 DESTRUCT PREMALG LESION: CPT | Mod: S$PBB,,, | Performed by: DERMATOLOGY

## 2023-03-15 PROCEDURE — 99999 PR PBB SHADOW E&M-EST. PATIENT-LVL III: ICD-10-PCS | Mod: PBBFAC,,, | Performed by: DERMATOLOGY

## 2023-03-15 NOTE — PROGRESS NOTES
Subjective:       Patient ID:  Mindi Agudelo is a 72 y.o. female who presents for   Chief Complaint   Patient presents with    Skin Check     Follow epidex tx     HPI  Established patient.  6 month f/u after rec'd Efudex to nose following bx showing AK at superior nasal dorsum. Pt has medication but reports never completing therapy.   No personal or known family hx skin cancer.      9/2022  SKIN, SUPERIOR NASAL DORSUM LESION, SHAVE BIOPSY:   Actinic keratosis, focally traumatized.   No malignancy identified.     Past Medical History:   Diagnosis Date    Aortic atherosclerosis 01/24/2020    Arthritis     Cataract     OU    GERD (gastroesophageal reflux disease)     Hemorrhoid     HTN (hypertension) 06/24/2013    Hypertension     Mild vitamin D deficiency     ANG on CPAP     Osteopenia     Thyroid disease      Review of Systems   Constitutional:  Negative for fever, chills and fatigue.   Respiratory:  Negative for cough and shortness of breath.    Skin:  Positive for daily sunscreen use, activity-related sunscreen use and wears hat. Negative for itching and rash.      Objective:    Physical Exam   Constitutional: She appears well-developed and well-nourished.   HENT:   Mouth/Throat: Lips normal.    Eyes: Lids are normal.    Neurological: She is alert and oriented to person, place, and time.   Psychiatric: She has a normal mood and affect.   Skin:               Diagram Legend     Erythematous scaling macule/papule c/w actinic keratosis       Vascular papule c/w angioma      Pigmented verrucoid papule/plaque c/w seborrheic keratosis      Yellow umbilicated papule c/w sebaceous hyperplasia      Irregularly shaped tan macule c/w lentigo     1-2 mm smooth white papules consistent with Milia      Movable subcutaneous cyst with punctum c/w epidermal inclusion cyst      Subcutaneous movable cyst c/w pilar cyst      Firm pink to brown papule c/w dermatofibroma      Pedunculated fleshy papule(s) c/w skin tag(s)      Evenly  pigmented macule c/w junctional nevus     Mildly variegated pigmented, slightly irregular-bordered macule c/w mildly atypical nevus      Flesh colored to evenly pigmented papule c/w intradermal nevus       Pink pearly papule/plaque c/w basal cell carcinoma      Erythematous hyperkeratotic cursted plaque c/w SCC      Surgical scar with no sign of skin cancer recurrence      Open and closed comedones      Inflammatory papules and pustules      Verrucoid papule consistent consistent with wart     Erythematous eczematous patches and plaques     Dystrophic onycholytic nail with subungual debris c/w onychomycosis     Umbilicated papule    Erythematous-base heme-crusted tan verrucoid plaque consistent with inflamed seborrheic keratosis     Erythematous Silvery Scaling Plaque c/w Psoriasis     See annotation          Assessment / Plan:          AK (actinic keratosis)  - Discussed diagnosis, etiology, and precancerous nature of condition.   - Cryosurgery Procedure Note: Discussed procedure with patient/patient's guardian including risks and benefits as well as treatment alternatives. Risks of procedure include pain, itching, swelling, redness, blistering, crusting, wound formation, post-inflammatory pigmentary alteration, scar, recurrence. Verbal consent obtained. LN2 cryosurgery performed to 1 lesion(s). Patient tolerated procedure well. After-visit wound care instructions reviewed and provided in writing.      Lentigines  - Benign; reassured treatment not necessary.   - Recommended daily sun protection, including the use of OTC broad-spectrum sunscreen (SPF 30 or greater) and sun-protective clothing.       Actinic skin damage  Specific location of prior biopsy not currently concerning for significant recurrent or residual lesion. Hx of AK at nose as well as one significant focal AK as above. Lentigines present. Given this evidence of actinic skin damage, discussed utility of field therapy to nose, especially given patient  has medication on hand anyway. Pt to perform, f/u annual skin check - due 7/2023 .  - Counseled on potential SE of medication(s) and instructed on use.             Follow up in about 4 months (around 7/15/2023) for annual skin check.

## 2023-03-17 ENCOUNTER — TELEPHONE (OUTPATIENT)
Dept: DERMATOLOGY | Facility: CLINIC | Age: 73
End: 2023-03-17
Payer: MEDICARE

## 2023-03-17 NOTE — PATIENT INSTRUCTIONS
Field Treatment for Actinic Keratoses (precancerous lesions)    5-Fluorouracil - This is a topical chemotherapy for your skin. This cream should never be applied without discussing with you dermatologist.    This treatment gets your immune system involved in fighting precancers (actinic keratoses), even those we can't yet see.     HOW TO APPLY: Apply a fingertip length amount to the entire area (nose) 2x/day for 2 weeks. Wash your hands carefully after applying the cream and wipe glasses, BIPAP/CPAP machines, or anything else that comes in frequent contact with the cream.    WHAT TO EXPECT: Your skin will likely become red, crusted, sore, and tender during the treatment usually starting around day 3 and continuing for about 1 week after last application. Your skin may take up to 6 weeks to return to its normal coloring (lose the pink).     HOW TO HEAL FAST: To speed healing, wash with a gentle wash and apply Vaseline jelly especially to crusted open areas.    WE CAN HELP: Please contact us for any questions or problem shooting the application of these creams: [phone] or [email or web address]    IT WILL BE WORTH IT!!!

## 2023-03-17 NOTE — TELEPHONE ENCOUNTER
Attempted to contact patient, no answer, lvm for a return call.     ----- Message from Shaniqua Osman MD sent at 3/17/2023  5:23 AM CDT -----  4 month f/u for annual skin check, thanks

## 2023-05-03 DIAGNOSIS — Z71.89 COMPLEX CARE COORDINATION: ICD-10-CM

## 2023-05-30 ENCOUNTER — TELEPHONE (OUTPATIENT)
Dept: FAMILY MEDICINE | Facility: CLINIC | Age: 73
End: 2023-05-30
Payer: MEDICARE

## 2023-05-30 DIAGNOSIS — Z12.31 ENCOUNTER FOR SCREENING MAMMOGRAM FOR MALIGNANT NEOPLASM OF BREAST: Primary | ICD-10-CM

## 2023-05-30 NOTE — TELEPHONE ENCOUNTER
----- Message from Jen Washington sent at 5/30/2023  9:11 AM CDT -----  Contact: pt  Type:  Needs Medical Advice    Who Called: Pt  Would the patient rather a call back or a response via [facility]? call  Best Call Back Number: [phone]  Additional Information: Pt states that she need a callback as soon as possible. States that she need orders so that she can schedule her mammo. Please advise thank you

## 2023-06-01 ENCOUNTER — OFFICE VISIT (OUTPATIENT)
Dept: OPTOMETRY | Facility: CLINIC | Age: 73
End: 2023-06-01
Payer: MEDICARE

## 2023-06-01 DIAGNOSIS — H01.00A BLEPHARITIS OF UPPER AND LOWER EYELIDS OF BOTH EYES, UNSPECIFIED TYPE: ICD-10-CM

## 2023-06-01 DIAGNOSIS — Z13.5 GLAUCOMA SCREENING: ICD-10-CM

## 2023-06-01 DIAGNOSIS — H43.813 POSTERIOR VITREOUS DETACHMENT OF BOTH EYES: Primary | ICD-10-CM

## 2023-06-01 DIAGNOSIS — H01.00B BLEPHARITIS OF UPPER AND LOWER EYELIDS OF BOTH EYES, UNSPECIFIED TYPE: ICD-10-CM

## 2023-06-01 DIAGNOSIS — H25.13 NUCLEAR SCLEROSIS OF BOTH EYES: ICD-10-CM

## 2023-06-01 PROCEDURE — 99213 OFFICE O/P EST LOW 20 MIN: CPT | Mod: PBBFAC,PO | Performed by: OPTOMETRIST

## 2023-06-01 PROCEDURE — 99999 PR PBB SHADOW E&M-EST. PATIENT-LVL III: CPT | Mod: PBBFAC,,, | Performed by: OPTOMETRIST

## 2023-06-01 PROCEDURE — 92014 COMPRE OPH EXAM EST PT 1/>: CPT | Mod: S$PBB,,, | Performed by: OPTOMETRIST

## 2023-06-01 PROCEDURE — 92014 PR EYE EXAM, EST PATIENT,COMPREHESV: ICD-10-PCS | Mod: S$PBB,,, | Performed by: OPTOMETRIST

## 2023-06-01 PROCEDURE — 99999 PR PBB SHADOW E&M-EST. PATIENT-LVL III: ICD-10-PCS | Mod: PBBFAC,,, | Performed by: OPTOMETRIST

## 2023-06-01 NOTE — PATIENT INSTRUCTIONS
"DRY EYES -- BURNING OR JAYRO SYMPTOMS:  Use Over The Counter artificial tears as needed for dry eye symptoms.   Some common brands include:  Systane, Optive, Refresh, and Thera-Tears.  These drops can be used as frequently as desired, but may be most helpful use during long periods of concentrated work.  For example, reading / working at the computer. Start with 3-4x per day.     Nighttime Ophthalmic gel or ointments are available: Refresh PM, Genteal, and Lacrilube.    Avoid drops that "get redness out" (Visine, Murine, Clear Eyes), as these may contain medication that could further irritate the eyes, especially with chronic use.    ALLERGY EYES -- ITCHING SYMPTOMS:  Over the counter medications include--Pataday, Zaditor, and Alaway.  Use as directed 1-2 drops daily for symptoms of itching / watering eyes.  These drops will not help for dry eye or exposure symptoms.    REDNESS RELIEF:  Lumify---is a good redness reliever that will not cause irritation if used chronically.        FLASHES / FLOATERS / POSTERIOR VITREOUS DETACHMENT    Call the clinic if you have any further changes in symptoms.  Including:  Increased numbers of floaters or flashing lights, dimness or darkness that moves through or stays constant in your vision, or any pain in the eye (s).    You may sometimes see small specks or clouds moving in your field of vision.  They are called FLOATERS.  You can often see them when looking at a plain background, like a blank wall or blue asa.  Floaters are actually tiny clumps of gel or cells inside the VITREOUS, the clear jelly-like fluid that fills the inside of your eye.    While these objects look like they are in front of your eye, they are actually floating inside.  What you see are the shadows they cast on the RETINA, the nerve layer at the back of the eye that senses light and allows you to see.      POSTERIOR VITREOUS DETACHMENT    The appearance of new floaters may be alarming.  If you suddenly " develop new floaters, you should contact your eye care professional  right away.    The retina can tear if the shrinking vitreous pulls away from the wall of the eye.  This sometimes causes a small amount of bleeding in the eye that may appear as new floaters.    A torn retina is always a serious problem, since it can lead to a retinal detachment.  You should see your eye care professional as soon as possible if:    even one new floater appears suddenly;  you see sudden flashes of light;  you notice other symptoms, like the loss of side vision, or a curtain closes down in your vision        POSTERIOR VITREOUS DETACHMENT is more common for people who:    are nearsighted;  have had cataract surgery;  have had YAG laser surgery of the eye;  have had inflammation inside the eye;  are over age 60.      While some floaters may remain visible, many of them will fade over time and become less noticeable.  Even if you've had some floaters for years, you should have your eyes checked as soon as possible if you notice new ones.    FLASHING LIGHTS    When the vitreous gel rubs or pulls on the retina, you may see what look like flashing lights or lightning streaks.  These flashes can appear off and on for several weeks or months.      Some people experience flashes of light that appear as jagged lines or heat waves in both eyes, lasting 10-20 minutes.  These flashes are caused by a spasm of blood vessels in the brain, which is called a migraine.    If a headache follows these flashes, it's called a migraine headache.  If   no headache occurs, these flashes are called Ophthalmic or Ocular Migraine.             BLEPHARITIS & TREATMENT   Definition  Blepharitis is an inflammation of the eyelid margin, which causes itchy, irritated, and often red eyes. One common cause is staphylococcus, skin bacteria, which can thrive in these conditions and can possibly cause eye damage such as corneal scarring.   Occurrence  Blepharitis is a  very common problem seen particularly in people with a tendency toward oily skin, dandruff, or dry eyes. Though most frequently seen later in life, blepharitis can begin in childhood.  It can also be associated with hormonal changes (puberty, menopause), or changes in medications.  It is also common in patients with Rosacea.  Symptoms  Lid Crusting - The lids are often reddened, somewhat swollen and scaly appearing at the base of the lashes. These scales, as they become coarser, form crusts that cause the lids to stick together. This is especially prominent upon waking.   Itching - The inflammation of the lids will cause itching and irritation.   Tearing/ Light Sensitivity -The eyes may tear more frequently and may also be sensitive to bright light.  Treatment  Treatment is aimed at lowering the number of bacteria along the eyelid margin and decreasing the scales by lid hygiene and in some cases antibiotic/steroid medications. The goal is to control this problem and prevent it from becoming a more serious condition. Treatment is focused on keeping the condition under control by routine daily lid hygiene. Unfortunately, if the treatment is stopped the symptoms often recur.    Upon waking, place a clean, warm, wet, washcloth over your closed eyelids (upper and lower) for several minutes.  This may be accomplished by allowing warm shower water to run with eyes closed.  Place a small amount of diluted (1/4 strength) Baby Shampoo or a commercial lid cleaning solution on a cotton swab, washcloth, or your clean fingertip. Gently pull down on your lower lid and use a horizontal back and forth motion to scrub the edge of your lid at the base of the eyelashes. Do not scrub the inside of the lid or the skin on the outside. Close the eye and use the cotton swab to scrub along the base of the upper lid lashes. Rinse the shampoo away with warm water.   Additionally your doctor may ask that you use an antibiotic/steroid drop or  ointment for a few weeks. Use as directed.   Perform this procedure 2 times a day for the first 7 days and then cut back to once a day. (Or per your doctors recommendation.) You may need to continue lid scrubs on a daily basis, though some find they can successfully control their blepharitis symptoms with scrubs done 2 to 3 times a week.    Medication--use as directed if medication is prescribed    ________________________________________________________________________

## 2023-06-01 NOTE — PROGRESS NOTES
HPI    Routine-dle-5/22    Pt denies any blurred va. Wearing readers. No new flashes or floaters.   Last edited by Zee Martinez on 6/1/2023 10:39 AM.            Assessment /Plan     For exam results, see Encounter Report.    Posterior vitreous detachment of both eyes    Nuclear sclerosis of both eyes    Blepharitis of upper and lower eyelids of both eyes, unspecified type    Glaucoma screening      RD precautions given and reviewed. Patient knows to call/ message if any further changes in symptoms occur.  Vis sig NS, not ready for consult, cautions driving mavis at night ---CE 3+ yrs   Moderate bleph / MGD, probable rosacea component ---lid hygiene, sheet w/ scrubs to consider qhs or qod---ATs for comfort   Not suspect     Defers refraction   Ok continue otc for near , prn     Discussed and educated patient on current findings /plan.  RTC 1 year, prn if any changes / issues

## 2023-06-06 ENCOUNTER — LAB VISIT (OUTPATIENT)
Dept: LAB | Facility: HOSPITAL | Age: 73
End: 2023-06-06
Attending: INTERNAL MEDICINE
Payer: MEDICARE

## 2023-06-06 ENCOUNTER — OFFICE VISIT (OUTPATIENT)
Dept: FAMILY MEDICINE | Facility: CLINIC | Age: 73
End: 2023-06-06
Payer: MEDICARE

## 2023-06-06 VITALS
WEIGHT: 124 LBS | HEIGHT: 66 IN | BODY MASS INDEX: 19.93 KG/M2 | RESPIRATION RATE: 16 BRPM | DIASTOLIC BLOOD PRESSURE: 82 MMHG | HEART RATE: 60 BPM | SYSTOLIC BLOOD PRESSURE: 138 MMHG

## 2023-06-06 DIAGNOSIS — Z86.69 HISTORY OF SLEEP APNEA: ICD-10-CM

## 2023-06-06 DIAGNOSIS — E55.9 MILD VITAMIN D DEFICIENCY: ICD-10-CM

## 2023-06-06 DIAGNOSIS — I70.0 AORTIC ATHEROSCLEROSIS: ICD-10-CM

## 2023-06-06 DIAGNOSIS — I10 ESSENTIAL HYPERTENSION: Primary | ICD-10-CM

## 2023-06-06 DIAGNOSIS — M85.89 OSTEOPENIA OF MULTIPLE SITES: ICD-10-CM

## 2023-06-06 DIAGNOSIS — I49.9 CARDIAC ARRHYTHMIA, UNSPECIFIED CARDIAC ARRHYTHMIA TYPE: ICD-10-CM

## 2023-06-06 DIAGNOSIS — I10 ESSENTIAL HYPERTENSION: ICD-10-CM

## 2023-06-06 DIAGNOSIS — Z23 NEED FOR VACCINATION: ICD-10-CM

## 2023-06-06 DIAGNOSIS — E04.2 MULTIPLE THYROID NODULES: ICD-10-CM

## 2023-06-06 LAB
25(OH)D3+25(OH)D2 SERPL-MCNC: 48 NG/ML (ref 30–96)
ALBUMIN SERPL BCP-MCNC: 4.3 G/DL (ref 3.5–5.2)
ALP SERPL-CCNC: 50 U/L (ref 55–135)
ALT SERPL W/O P-5'-P-CCNC: 12 U/L (ref 10–44)
ANION GAP SERPL CALC-SCNC: 9 MMOL/L (ref 8–16)
AST SERPL-CCNC: 23 U/L (ref 10–40)
BASOPHILS # BLD AUTO: 0.02 K/UL (ref 0–0.2)
BASOPHILS NFR BLD: 0.5 % (ref 0–1.9)
BILIRUB SERPL-MCNC: 0.6 MG/DL (ref 0.1–1)
BUN SERPL-MCNC: 11 MG/DL (ref 8–23)
CALCIUM SERPL-MCNC: 10.5 MG/DL (ref 8.7–10.5)
CHLORIDE SERPL-SCNC: 104 MMOL/L (ref 95–110)
CHOLEST SERPL-MCNC: 174 MG/DL (ref 120–199)
CHOLEST/HDLC SERPL: 2.1 {RATIO} (ref 2–5)
CO2 SERPL-SCNC: 28 MMOL/L (ref 23–29)
CREAT SERPL-MCNC: 0.7 MG/DL (ref 0.5–1.4)
DIFFERENTIAL METHOD: ABNORMAL
EOSINOPHIL # BLD AUTO: 0.1 K/UL (ref 0–0.5)
EOSINOPHIL NFR BLD: 1.4 % (ref 0–8)
ERYTHROCYTE [DISTWIDTH] IN BLOOD BY AUTOMATED COUNT: 14.9 % (ref 11.5–14.5)
EST. GFR  (NO RACE VARIABLE): >60 ML/MIN/1.73 M^2
GLUCOSE SERPL-MCNC: 102 MG/DL (ref 70–110)
HCT VFR BLD AUTO: 44.8 % (ref 37–48.5)
HDLC SERPL-MCNC: 82 MG/DL (ref 40–75)
HDLC SERPL: 47.1 % (ref 20–50)
HGB BLD-MCNC: 14.2 G/DL (ref 12–16)
IMM GRANULOCYTES # BLD AUTO: 0.01 K/UL (ref 0–0.04)
IMM GRANULOCYTES NFR BLD AUTO: 0.2 % (ref 0–0.5)
LDLC SERPL CALC-MCNC: 82.8 MG/DL (ref 63–159)
LYMPHOCYTES # BLD AUTO: 1.4 K/UL (ref 1–4.8)
LYMPHOCYTES NFR BLD: 32.1 % (ref 18–48)
MCH RBC QN AUTO: 28 PG (ref 27–31)
MCHC RBC AUTO-ENTMCNC: 31.7 G/DL (ref 32–36)
MCV RBC AUTO: 88 FL (ref 82–98)
MONOCYTES # BLD AUTO: 0.3 K/UL (ref 0.3–1)
MONOCYTES NFR BLD: 7.6 % (ref 4–15)
NEUTROPHILS # BLD AUTO: 2.4 K/UL (ref 1.8–7.7)
NEUTROPHILS NFR BLD: 58.2 % (ref 38–73)
NONHDLC SERPL-MCNC: 92 MG/DL
NRBC BLD-RTO: 0 /100 WBC
PLATELET # BLD AUTO: 198 K/UL (ref 150–450)
PMV BLD AUTO: 9.3 FL (ref 9.2–12.9)
POTASSIUM SERPL-SCNC: 4.5 MMOL/L (ref 3.5–5.1)
PROT SERPL-MCNC: 7.9 G/DL (ref 6–8.4)
RBC # BLD AUTO: 5.08 M/UL (ref 4–5.4)
SODIUM SERPL-SCNC: 141 MMOL/L (ref 136–145)
TRIGL SERPL-MCNC: 46 MG/DL (ref 30–150)
WBC # BLD AUTO: 4.2 K/UL (ref 3.9–12.7)

## 2023-06-06 PROCEDURE — 93010 ELECTROCARDIOGRAM REPORT: CPT | Mod: S$PBB,,, | Performed by: INTERNAL MEDICINE

## 2023-06-06 PROCEDURE — 99214 PR OFFICE/OUTPT VISIT, EST, LEVL IV, 30-39 MIN: ICD-10-PCS | Mod: S$PBB,,, | Performed by: INTERNAL MEDICINE

## 2023-06-06 PROCEDURE — 85025 COMPLETE CBC W/AUTO DIFF WBC: CPT | Performed by: INTERNAL MEDICINE

## 2023-06-06 PROCEDURE — 93010 EKG 12-LEAD: ICD-10-PCS | Mod: S$PBB,,, | Performed by: INTERNAL MEDICINE

## 2023-06-06 PROCEDURE — 36415 COLL VENOUS BLD VENIPUNCTURE: CPT | Mod: PN | Performed by: INTERNAL MEDICINE

## 2023-06-06 PROCEDURE — 99999 PR PBB SHADOW E&M-EST. PATIENT-LVL III: ICD-10-PCS | Mod: PBBFAC,,, | Performed by: INTERNAL MEDICINE

## 2023-06-06 PROCEDURE — 82306 VITAMIN D 25 HYDROXY: CPT | Performed by: INTERNAL MEDICINE

## 2023-06-06 PROCEDURE — 80061 LIPID PANEL: CPT | Performed by: INTERNAL MEDICINE

## 2023-06-06 PROCEDURE — 99999 PR PBB SHADOW E&M-EST. PATIENT-LVL III: CPT | Mod: PBBFAC,,, | Performed by: INTERNAL MEDICINE

## 2023-06-06 PROCEDURE — 80053 COMPREHEN METABOLIC PANEL: CPT | Performed by: INTERNAL MEDICINE

## 2023-06-06 PROCEDURE — 99213 OFFICE O/P EST LOW 20 MIN: CPT | Mod: PBBFAC,PN,25 | Performed by: INTERNAL MEDICINE

## 2023-06-06 PROCEDURE — 99214 OFFICE O/P EST MOD 30 MIN: CPT | Mod: S$PBB,,, | Performed by: INTERNAL MEDICINE

## 2023-06-06 PROCEDURE — 90677 PCV20 VACCINE IM: CPT | Mod: PBBFAC,PN

## 2023-06-06 PROCEDURE — 93005 ELECTROCARDIOGRAM TRACING: CPT | Mod: PBBFAC,PN | Performed by: INTERNAL MEDICINE

## 2023-06-06 NOTE — PROGRESS NOTES
Assessment and Plan:    1. Essential hypertension  Controlled on digital HTN program, continue current medication.  - Comprehensive Metabolic Panel; Future  - Lipid Panel; Future    2. Multiple thyroid nodules  Continue follow up with Endo.    3. Aortic atherosclerosis  Continue statin.  - Comprehensive Metabolic Panel; Future  - Lipid Panel; Future    4. Osteopenia of multiple sites  Declines DEXA as she notes she would not want treatment if it was indicated.   - Vitamin D; Future    5. Mild vitamin D deficiency  - Vitamin D; Future    6. History of sleep apnea  - CBC Auto Differential; Future    7. Need for vaccination  - (In Office Administered) Pneumococcal Conjugate Vaccine (20 Valent) (IM)    8. Cardiac arrhythmia, unspecified cardiac arrhythmia type  On initial exam HR was regularly irregular and patient reported feeling sensation of skipped beats at that time. States this happens only very rarely. EKG was normal, but symptoms had resolved by the time of the EKG and exam was normal at that time. Prior echo normal. Prior EKGs unremarkable. Discussed monitoring and if she is having palpitations on a regular basis, would get Holter.  - IN OFFICE EKG 12-LEAD (to Muse)      ______________________________________________________________________  Subjective:    Chief Complaint:  Follow up chronic medical conditions.    HPI:  Mindi is a 72 y.o. year old female here to follow up chronic medical conditions.     HTN- BP has been borderline high at some clinic visits, but controlled with home readings through digital HTN. Taking losartan 25.      ANG- She had been diagnosed with ANG in 2013, has lost 30 lbs since then. Recently did a home sleep study that did not show any apneic episodes.     Multinodular goiter- Seen Dr. Walsh. Had FNA in 2020. Normal TFTs, not on replacement.     Aortic atherosclerosis- On atorvastatin 40.     Osteopenia- Lower risk of fracture on DEXA from 2017. Due for recheck at this point, but she  reports that she does not want to     Medications:  Current Outpatient Medications on File Prior to Visit   Medication Sig Dispense Refill    atorvastatin (LIPITOR) 40 MG tablet TAKE 1 TABLET(40 MG) BY MOUTH EVERY DAY 90 tablet 3    losartan (COZAAR) 25 MG tablet Take 0.5 tablets (12.5 mg total) by mouth once daily. 45 tablet 3    magnesium 30 mg Tab Take 500 mg by mouth once.      multivitamin (THERAGRAN) per tablet Take 1 tablet by mouth as needed.       fluorouraciL (EFUDEX) 5 % cream AAA nose bid x 2 weeks (Patient not taking: Reported on 6/6/2023) 40 g 1    [DISCONTINUED] acetaminophen (TYLENOL) 500 MG tablet Take 2 tablets (1,000 mg total) by mouth every 8 (eight) hours. 90 tablet 0    [DISCONTINUED] b complex vitamins tablet Take 1 tablet by mouth as needed.       [DISCONTINUED] CALCIUM CARBONATE/VITAMIN D3 (VITAMIN D-3 ORAL) Take 1 tablet by mouth once daily.       [DISCONTINUED] hydrocortisone 2.5 % cream Apply topically 2 (two) times daily. (Patient not taking: No sig reported) 3.5 g 0    [DISCONTINUED] tacrolimus (PROTOPIC) 0.1 % ointment Apply topically 2 (two) times daily as needed (eyelid irritation / rash). 30 g 3    [DISCONTINUED] TURMERIC, BULK, MISC 450 mg by Misc.(Non-Drug; Combo Route) route once daily.       Current Facility-Administered Medications on File Prior to Visit   Medication Dose Route Frequency Provider Last Rate Last Admin    electrolyte-S (ISOLYTE)   Intravenous Continuous Alonso Shaikh MD        lactated ringers infusion   Intravenous Continuous Alonso Shaikh MD 10 mL/hr at 01/28/20 0730 New Bag at 01/28/20 0812    lidocaine (PF) 10 mg/ml (1%) injection 10 mg  1 mL Intradermal Once Alonso Shaikh MD           Review of Systems:  Review of Systems   Constitutional:  Negative for activity change and unexpected weight change.   HENT:  Negative for hearing loss, rhinorrhea and trouble swallowing.    Eyes:  Negative for discharge and visual disturbance.   Respiratory:  Negative for  "chest tightness and wheezing.    Cardiovascular:  Negative for chest pain and palpitations.   Gastrointestinal:  Negative for blood in stool, constipation, diarrhea and vomiting.   Endocrine: Negative for polydipsia and polyuria.   Genitourinary:  Negative for difficulty urinating, dysuria, hematuria and menstrual problem.   Musculoskeletal:  Negative for arthralgias, joint swelling and neck pain.   Neurological:  Negative for weakness and headaches.   Psychiatric/Behavioral:  Negative for confusion and dysphoric mood.      Past Medical History:  Past Medical History:   Diagnosis Date    Aortic atherosclerosis 01/24/2020    Arthritis     Cataract     OU    GERD (gastroesophageal reflux disease)     Hemorrhoid     HTN (hypertension) 06/24/2013    Hypertension     Mild vitamin D deficiency     ANG on CPAP     Osteopenia     Thyroid disease        Objective:    Vitals:  Vitals:    06/06/23 0849   BP: 138/82   Pulse: 60   Resp: 16   Weight: 56.3 kg (124 lb 0.1 oz)   Height: 5' 6" (1.676 m)       Physical Exam  Vitals reviewed.   Constitutional:       General: She is not in acute distress.     Appearance: She is well-developed.   Eyes:      General: No scleral icterus.        Right eye: No discharge.         Left eye: No discharge.      Conjunctiva/sclera: Conjunctivae normal.   Cardiovascular:      Rate and Rhythm: Regular rhythm. Bradycardia present.      Heart sounds: No murmur heard.     Comments: On initial exam, heart rate was regularly irregular in a pattern like ventricular bigeminy; on subsequent exam, heart was bradycardic but regular with no murmurs  Pulmonary:      Effort: Pulmonary effort is normal. No respiratory distress.      Breath sounds: Normal breath sounds. No wheezing, rhonchi or rales.   Musculoskeletal:      Right lower leg: No edema.      Left lower leg: No edema.   Skin:     General: Skin is warm and dry.   Neurological:      Mental Status: She is alert and oriented to person, place, and time. "   Psychiatric:         Behavior: Behavior normal.         Thought Content: Thought content normal.         Judgment: Judgment normal.       Data:  EKG in clinic sinus bradycardia, no abnormal beats    Jeana Carpenter MD  Internal Medicine

## 2023-06-08 RX ORDER — ATORVASTATIN CALCIUM 40 MG/1
TABLET, FILM COATED ORAL
Qty: 90 TABLET | Refills: 3 | Status: SHIPPED | OUTPATIENT
Start: 2023-06-08

## 2023-08-04 ENCOUNTER — OFFICE VISIT (OUTPATIENT)
Dept: DERMATOLOGY | Facility: CLINIC | Age: 73
End: 2023-08-04
Payer: MEDICARE

## 2023-08-04 VITALS — HEIGHT: 66 IN | BODY MASS INDEX: 19.95 KG/M2 | RESPIRATION RATE: 18 BRPM | WEIGHT: 124.13 LBS

## 2023-08-04 DIAGNOSIS — D18.01 CHERRY ANGIOMA: ICD-10-CM

## 2023-08-04 DIAGNOSIS — L81.3 CAFÉ AU LAIT SPOT: ICD-10-CM

## 2023-08-04 DIAGNOSIS — L82.1 SK (SEBORRHEIC KERATOSIS): ICD-10-CM

## 2023-08-04 DIAGNOSIS — L81.4 LENTIGINES: ICD-10-CM

## 2023-08-04 DIAGNOSIS — D22.9 MULTIPLE BENIGN NEVI: Primary | ICD-10-CM

## 2023-08-04 DIAGNOSIS — Z12.83 SCREENING FOR SKIN CANCER: ICD-10-CM

## 2023-08-04 PROCEDURE — 99999 PR PBB SHADOW E&M-EST. PATIENT-LVL III: CPT | Mod: PBBFAC,,, | Performed by: DERMATOLOGY

## 2023-08-04 PROCEDURE — 99213 PR OFFICE/OUTPT VISIT, EST, LEVL III, 20-29 MIN: ICD-10-PCS | Mod: S$PBB,,, | Performed by: DERMATOLOGY

## 2023-08-04 PROCEDURE — 99999 PR PBB SHADOW E&M-EST. PATIENT-LVL III: ICD-10-PCS | Mod: PBBFAC,,, | Performed by: DERMATOLOGY

## 2023-08-04 PROCEDURE — 99213 OFFICE O/P EST LOW 20 MIN: CPT | Mod: S$PBB,,, | Performed by: DERMATOLOGY

## 2023-08-04 PROCEDURE — 99213 OFFICE O/P EST LOW 20 MIN: CPT | Mod: PBBFAC,PO | Performed by: DERMATOLOGY

## 2023-08-04 NOTE — PROGRESS NOTES
Subjective:      Patient ID:  Mindi Agudelo is a 72 y.o. female who presents for   Chief Complaint   Patient presents with    Skin Check     HPI    Established patient..  Here today for total body skin exam.   +AK, hx of Efudex field therapy to nose. No personal or known family hx skin cancer.    C/o lesion at R forearm.   Otherwise no specific complaints.     Past Medical History:   Diagnosis Date    Aortic atherosclerosis 01/24/2020    Arthritis     Cataract     OU    GERD (gastroesophageal reflux disease)     Hemorrhoid     HTN (hypertension) 06/24/2013    Hypertension     Mild vitamin D deficiency     ANG on CPAP     Osteopenia     Thyroid disease        Review of Systems   Constitutional:  Negative for fever, chills and fatigue.   Respiratory:  Negative for cough and shortness of breath.    Skin:  Positive for daily sunscreen use, activity-related sunscreen use and wears hat. Negative for itching and rash.       Objective:   Physical Exam   Constitutional: She appears well-developed and well-nourished.   HENT:   Mouth/Throat: Lips normal.    Eyes: Lids are normal.    Neurological: She is alert and oriented to person, place, and time.   Psychiatric: She has a normal mood and affect.   Skin:   Areas Examined (abnormalities noted in diagram):   Scalp / Hair Palpated and Inspected  Head / Face Inspection Performed  Neck Inspection Performed  Chest / Axilla Inspection Performed  Abdomen Inspection Performed  Genitals / Buttocks / Groin Inspection Performed  Back Inspection Performed  RUE Inspected  LUE Inspection Performed  RLE Inspected  LLE Inspection Performed  Nails and Digits Inspection Performed                 Diagram Legend     Erythematous scaling macule/papule c/w actinic keratosis       Vascular papule c/w angioma      Pigmented verrucoid papule/plaque c/w seborrheic keratosis      Yellow umbilicated papule c/w sebaceous hyperplasia      Irregularly shaped tan macule c/w lentigo     1-2 mm smooth white  papules consistent with Milia      Movable subcutaneous cyst with punctum c/w epidermal inclusion cyst      Subcutaneous movable cyst c/w pilar cyst      Firm pink to brown papule c/w dermatofibroma      Pedunculated fleshy papule(s) c/w skin tag(s)      Evenly pigmented macule c/w junctional nevus     Mildly variegated pigmented, slightly irregular-bordered macule c/w mildly atypical nevus      Flesh colored to evenly pigmented papule c/w intradermal nevus       Pink pearly papule/plaque c/w basal cell carcinoma      Erythematous hyperkeratotic cursted plaque c/w SCC      Surgical scar with no sign of skin cancer recurrence      Open and closed comedones      Inflammatory papules and pustules      Verrucoid papule consistent consistent with wart     Erythematous eczematous patches and plaques     Dystrophic onycholytic nail with subungual debris c/w onychomycosis     Umbilicated papule    Erythematous-base heme-crusted tan verrucoid plaque consistent with inflamed seborrheic keratosis     Erythematous Silvery Scaling Plaque c/w Psoriasis     See annotation      Assessment / Plan:        Multiple benign nevi  - Discussed diagnosis, etiology, and benign-nature of condition.  - Reassured; no lesions suspicious for malignancy noted on exam today.   - Recommended routine self examination of skin. Discussed the ABCDEs of melanoma and ugly duckling sign.   - Recommended daily sun protection, including the use of OTC broad-spectrum sunscreen (SPF 30 or greater) and sun-protective clothing.      Lentigines  - Benign; reassured treatment not necessary.   - Recommended daily sun protection, including the use of OTC broad-spectrum sunscreen (SPF 30 or greater) and sun-protective clothing.       SK (seborrheic keratosis)  Cherry angioma  Café au lait spot  - Benign; reassured treatment not necessary.      Screening for skin cancer  - Total body skin examination performed today.  - Findings listed above.   - Recommended routine  self examination of skin.    - Recommended daily sun protection, including the use of OTC broad-spectrum sunscreen (SPF 30 or greater) and sun-protective clothing.             Follow up for annual skin checks, sooner PRN.

## 2023-08-28 DIAGNOSIS — I10 ESSENTIAL HYPERTENSION: ICD-10-CM

## 2023-08-29 RX ORDER — LOSARTAN POTASSIUM 25 MG/1
12.5 TABLET ORAL DAILY
Qty: 45 TABLET | Refills: 1 | Status: SHIPPED | OUTPATIENT
Start: 2023-08-29 | End: 2023-09-28 | Stop reason: SDUPTHER

## 2023-09-13 NOTE — PATIENT INSTRUCTIONS
What Are the Symptoms of Skin Cancer?  A change in your skin is the most common sign of skin cancer. This could be a new growth, a sore that doesnt heal, or a change in a mole. Not all skin cancers look the same.    For melanoma specifically, a simple way to remember the warning signs is to remember the A-B-C-D-Es of melanoma--    A stands for asymmetrical. Does the mole or spot have an irregular shape with two parts that look very different?  B stands for border. Is the border irregular or jagged?  C is for color. Is the color uneven?  D is for diameter. Is the mole or spot larger than the size of a pea?  E is for evolving. Has the mole or spot changed during the past few weeks or months?    Talk to your doctor if you notice changes in your skin such as a new growth, a sore that doesnt heal, a change in an old growth, or any of the A-B-C-D-Es of melanoma    What Can I Do to Reduce My Risk of Skin Cancer?  Protection from ultraviolet (UV) radiation is important all year, not just during the summer or at the beach. UV rays from the sun can reach you on cloudy and hazy days, not just on bright and finn days. UV rays also reflect off of surfaces like water, cement, sand, and snow. Indoor tanning (using a tanning bed, whitaker, or sunlamp to get tan) exposes users to UV radiation.    The hours between 10 a.m. and 4 p.m. Daylight Saving Time (9 a.m. to 3 p.m. standard time) are the most hazardous for UV exposure outdoors in the continental [place]. UV rays from sunlight are the greatest during the late spring and early summer in [place].    CDC recommends easy options for protection from UV radiation--    Stay in the shade or indoors, especially during midday hours.  Wear clothing that covers your arms and legs.  Wear a hat with a wide brim to shade your face, head, ears, and neck.  Wear sunglasses that wrap around and block both UVA and UVB rays.  Use sunscreen with a sun protection factor (SPF) of  30 or higher, and both UVA and UVB (broad spectrum) protection.  Avoid indoor tanning.    Adapted from https://www.cdc.gov/cancer/skin/basic_info/

## 2023-09-14 ENCOUNTER — PATIENT MESSAGE (OUTPATIENT)
Dept: ADMINISTRATIVE | Facility: OTHER | Age: 73
End: 2023-09-14
Payer: MEDICARE

## 2023-09-26 NOTE — PROGRESS NOTES
"Subjective:    Patient ID:  Mindi Agudelo is a 72 y.o. female who presents for follow-up of aortic atherosclerosis      Problem List Items Addressed This Visit          Cardiac/Vascular    Aortic atherosclerosis - Primary    Essential hypertension       HPI    Patient was last seen on 09/20/2022 at which time she was doing okay from a cardiac standpoint.    On assessment today, the patient states that she feels OK today.    No chest pain.  No shortness of breath.    6x a week she does 5 miles walking, did 4 this AM  Also doing a whole ton of stairs without issues 2x a week   Weight training     Last 5 Patient Entered Readings                Current 30 Day Average: 114/63  Recent Readings 9/25/2023 9/25/2023 9/24/2023 9/19/2023 9/18/2023   SBP (mmHg) 118 121 119 115 117   DBP (mmHg) 67 68 66 66 65   Pulse 56 56 45 52 50                        Objective:     Vitals:    09/27/23 1023   BP: (!) 157/79   BP Location: Left arm   Patient Position: Sitting   BP Method: Medium (Automatic)   Pulse: 86   Weight: 55.1 kg (121 lb 7.6 oz)   Height: 5' 6" (1.676 m)       BP Readings from Last 5 Encounters:   09/27/23 (!) 157/79   06/06/23 138/82   10/14/22 (!) 148/86   09/20/22 (!) 176/80   03/30/22 113/62        Physical Exam  Vitals and nursing note reviewed.   Constitutional:       General: She is not in acute distress.     Appearance: She is well-developed.   HENT:      Head: Normocephalic and atraumatic.   Neck:      Vascular: No JVD.   Cardiovascular:      Rate and Rhythm: Normal rate and regular rhythm.      Heart sounds: Normal heart sounds. No murmur heard.     No friction rub. No gallop.   Pulmonary:      Effort: Pulmonary effort is normal. No respiratory distress.      Breath sounds: Normal breath sounds. No wheezing or rales.   Abdominal:      General: Bowel sounds are normal.      Palpations: Abdomen is soft.      Tenderness: There is no abdominal tenderness. There is no guarding or rebound.   Musculoskeletal:         " General: No tenderness.      Cervical back: Neck supple.   Skin:     General: Skin is warm and dry.   Neurological:      Mental Status: She is alert and oriented to person, place, and time.   Psychiatric:         Behavior: Behavior normal.             Current Outpatient Medications   Medication Instructions    atorvastatin (LIPITOR) 40 MG tablet TAKE 1 TABLET(40 MG) BY MOUTH EVERY DAY    fluorouraciL (EFUDEX) 5 % cream AAA nose bid x 2 weeks    losartan (COZAAR) 12.5 mg, Oral, Daily    magnesium 500 mg, Oral, Once    multivitamin (THERAGRAN) per tablet 1 tablet, Oral, As needed (PRN)    ZINC ACETATE ORAL Oral       Lipid Panel:   Lab Results   Component Value Date    CHOL 174 06/06/2023    HDL 82 (H) 06/06/2023    LDLCALC 82.8 06/06/2023    TRIG 46 06/06/2023    CHOLHDL 47.1 06/06/2023       The 10-year ASCVD risk score (Rupesh DAVID, et al., 2019) is: 18.6%    Values used to calculate the score:      Age: 72 years      Sex: Female      Is Non- : No      Diabetic: No      Tobacco smoker: No      Systolic Blood Pressure: 157 mmHg      Is BP treated: Yes      HDL Cholesterol: 82 mg/dL      Total Cholesterol: 174 mg/dL    All pertinent labs, imaging, and EKGs reviewed.  Patient's most recent EKG tracing was personally interpreted by this provider.    Most Recent EKG Results  Results for orders placed or performed in visit on 06/06/23   IN OFFICE EKG 12-LEAD (to [place])    Collection Time: 06/06/23  9:41 AM    Narrative    Test Reason : I49.9,    Vent. Rate : 058 BPM     Atrial Rate : 058 BPM     P-R Int : 154 ms          QRS Dur : 086 ms      QT Int : 438 ms       P-R-T Axes : 065 -18 062 degrees     QTc Int : 429 ms    Sinus bradycardia  Otherwise normal ECG  When compared with ECG of 19-JUN-2020 14:44,  No significant change was found  Confirmed by Will Mitchell MD ([id number]) on 6/6/2023 3:12:05 PM    Referred By:             Confirmed By:Will Mitchell MD       Most Recent Echocardiogram Results  Results for  orders placed in visit on 07/24/20    Echo Color Flow Doppler? Yes    Interpretation Summary  · Normal left ventricular systolic function. The estimated ejection fraction is 60%.  · Normal LV diastolic function.  · Normal right ventricular systolic function.  · Normal central venous pressure (3 mmHg).  · The estimated PA systolic pressure is 31 mmHg.      Most Recent Nuclear Stress Test Results  No results found for this or any previous visit.      Most Recent Cardiac PET Stress Test Results  No results found for this or any previous visit.      Most Recent Cardiovascular Angiogram results  No results found for this or any previous visit.      Other Most Recent Cardiology Results  No results found for this or any previous visit.        Assessment:       1. Aortic atherosclerosis    2. Essential hypertension         Plan:     Symptoms OK today  BP borderline today  Most recent echocardiogram reviewed personally     Echocardiogram   Continue atorvastatin 40 mg PO Daily  Stop losartan 12.5mg PO Daily and monitor BP on Digital HTN program, may need to restart in the future     Continue other cardiac medications  Mediterranean Diet/Cardiovascular Exercise Program    Patient queried and all questions were answered.    F/u in 1 year to reassess      Signed:    Sukhjinder Pickering MD  9/27/2023 12:54 PM

## 2023-09-27 ENCOUNTER — OFFICE VISIT (OUTPATIENT)
Dept: CARDIOLOGY | Facility: CLINIC | Age: 73
End: 2023-09-27
Payer: MEDICARE

## 2023-09-27 VITALS
BODY MASS INDEX: 19.53 KG/M2 | DIASTOLIC BLOOD PRESSURE: 79 MMHG | WEIGHT: 121.5 LBS | HEIGHT: 66 IN | HEART RATE: 86 BPM | SYSTOLIC BLOOD PRESSURE: 157 MMHG

## 2023-09-27 DIAGNOSIS — I70.0 AORTIC ATHEROSCLEROSIS: Primary | ICD-10-CM

## 2023-09-27 DIAGNOSIS — Z01.30 ENCOUNTER FOR EXAMINATION OF BLOOD PRESSURE WITHOUT ABNORMAL FINDINGS: ICD-10-CM

## 2023-09-27 DIAGNOSIS — I10 ESSENTIAL HYPERTENSION: ICD-10-CM

## 2023-09-27 PROCEDURE — 99999 PR PBB SHADOW E&M-EST. PATIENT-LVL III: ICD-10-PCS | Mod: PBBFAC,,, | Performed by: INTERNAL MEDICINE

## 2023-09-27 PROCEDURE — 99214 OFFICE O/P EST MOD 30 MIN: CPT | Mod: S$PBB,,, | Performed by: INTERNAL MEDICINE

## 2023-09-27 PROCEDURE — 99213 OFFICE O/P EST LOW 20 MIN: CPT | Mod: PBBFAC,PO | Performed by: INTERNAL MEDICINE

## 2023-09-27 PROCEDURE — 99214 PR OFFICE/OUTPT VISIT, EST, LEVL IV, 30-39 MIN: ICD-10-PCS | Mod: S$PBB,,, | Performed by: INTERNAL MEDICINE

## 2023-09-27 PROCEDURE — 99999 PR PBB SHADOW E&M-EST. PATIENT-LVL III: CPT | Mod: PBBFAC,,, | Performed by: INTERNAL MEDICINE

## 2023-09-28 DIAGNOSIS — I10 ESSENTIAL HYPERTENSION: ICD-10-CM

## 2023-09-28 RX ORDER — LOSARTAN POTASSIUM 25 MG/1
12.5 TABLET ORAL DAILY
Qty: 45 TABLET | Refills: 1 | Status: SHIPPED | OUTPATIENT
Start: 2023-09-28 | End: 2024-09-27

## 2023-10-04 ENCOUNTER — PATIENT MESSAGE (OUTPATIENT)
Dept: CARDIOLOGY | Facility: CLINIC | Age: 73
End: 2023-10-04
Payer: MEDICARE

## 2023-10-13 ENCOUNTER — CLINICAL SUPPORT (OUTPATIENT)
Dept: CARDIOLOGY | Facility: HOSPITAL | Age: 73
End: 2023-10-13
Attending: INTERNAL MEDICINE
Payer: MEDICARE

## 2023-10-13 VITALS — HEART RATE: 64 BPM | WEIGHT: 121 LBS | BODY MASS INDEX: 19.44 KG/M2 | HEIGHT: 66 IN

## 2023-10-13 DIAGNOSIS — Z01.30 ENCOUNTER FOR EXAMINATION OF BLOOD PRESSURE WITHOUT ABNORMAL FINDINGS: ICD-10-CM

## 2023-10-13 PROCEDURE — 93306 TTE W/DOPPLER COMPLETE: CPT | Mod: 26,,, | Performed by: INTERNAL MEDICINE

## 2023-10-13 PROCEDURE — 93306 TTE W/DOPPLER COMPLETE: CPT | Mod: PO

## 2023-10-13 PROCEDURE — 93306 ECHO (CUPID ONLY): ICD-10-PCS | Mod: 26,,, | Performed by: INTERNAL MEDICINE

## 2023-10-16 LAB
ASCENDING AORTA: 2.6 CM
AV INDEX (PROSTH): 0.59
AV MEAN GRADIENT: 4 MMHG
AV PEAK GRADIENT: 8 MMHG
AV VALVE AREA BY VELOCITY RATIO: 2.56 CM²
AV VALVE AREA: 2.05 CM²
AV VELOCITY RATIO: 0.74
BSA FOR ECHO PROCEDURE: 1.6 M2
CV ECHO LV RWT: 0.44 CM
DOP CALC AO PEAK VEL: 1.38 M/S
DOP CALC AO VTI: 35 CM
DOP CALC LVOT AREA: 3.5 CM2
DOP CALC LVOT DIAMETER: 2.1 CM
DOP CALC LVOT PEAK VEL: 1.02 M/S
DOP CALC LVOT STROKE VOLUME: 71.66 CM3
DOP CALCLVOT PEAK VEL VTI: 20.7 CM
E WAVE DECELERATION TIME: 226.78 MSEC
E/A RATIO: 1.29
E/E' RATIO: 12.53 M/S
ECHO LV POSTERIOR WALL: 0.93 CM (ref 0.6–1.1)
EJECTION FRACTION: 65 %
FRACTIONAL SHORTENING: 39 % (ref 28–44)
INTERVENTRICULAR SEPTUM: 0.88 CM (ref 0.6–1.1)
IVRT: 102.76 MSEC
LEFT ATRIUM SIZE: 3.39 CM
LEFT ATRIUM VOLUME INDEX MOD: 40.1 ML/M2
LEFT ATRIUM VOLUME MOD: 65.01 CM3
LEFT INTERNAL DIMENSION IN SYSTOLE: 2.57 CM (ref 2.1–4)
LEFT VENTRICLE DIASTOLIC VOLUME INDEX: 49.31 ML/M2
LEFT VENTRICLE DIASTOLIC VOLUME: 79.89 ML
LEFT VENTRICLE MASS INDEX: 75 G/M2
LEFT VENTRICLE SYSTOLIC VOLUME INDEX: 14.8 ML/M2
LEFT VENTRICLE SYSTOLIC VOLUME: 23.9 ML
LEFT VENTRICULAR INTERNAL DIMENSION IN DIASTOLE: 4.23 CM (ref 3.5–6)
LEFT VENTRICULAR MASS: 120.96 G
LV LATERAL E/E' RATIO: 10.44 M/S
LV SEPTAL E/E' RATIO: 15.67 M/S
LVOT MG: 2.06 MMHG
LVOT MV: 0.67 CM/S
MV PEAK A VEL: 0.73 M/S
MV PEAK E VEL: 0.94 M/S
MV STENOSIS PRESSURE HALF TIME: 65.77 MS
MV VALVE AREA P 1/2 METHOD: 3.34 CM2
PISA MRMAX VEL: 6.72 M/S
PISA TR MAX VEL: 2.84 M/S
PULM VEIN S/D RATIO: 1.04
PV PEAK D VEL: 0.69 M/S
PV PEAK S VEL: 0.72 M/S
RA MAJOR: 4.03 CM
RA PRESSURE ESTIMATED: 3 MMHG
RA WIDTH: 3.4 CM
RIGHT VENTRICULAR END-DIASTOLIC DIMENSION: 3.65 CM
RIGHT VENTRICULAR LENGTH IN DIASTOLE (APICAL 4-CHAMBER VIEW): 3.95 CM
RV MID DIAMA: 3.43 CM
RV TB RVSP: 6 MMHG
RV TISSUE DOPPLER FREE WALL SYSTOLIC VELOCITY 1 (APICAL 4 CHAMBER VIEW): 13.32 CM/S
SINUS: 3.65 CM
STJ: 2.46 CM
TDI LATERAL: 0.09 M/S
TDI SEPTAL: 0.06 M/S
TDI: 0.08 M/S
TR MAX PG: 32 MMHG
TRICUSPID ANNULAR PLANE SYSTOLIC EXCURSION: 2.51 CM
TV REST PULMONARY ARTERY PRESSURE: 35 MMHG
Z-SCORE OF LEFT VENTRICULAR DIMENSION IN END DIASTOLE: -0.8
Z-SCORE OF LEFT VENTRICULAR DIMENSION IN END SYSTOLE: -0.79

## 2023-12-03 DIAGNOSIS — Z71.89 COMPLEX CARE COORDINATION: ICD-10-CM

## 2024-05-29 ENCOUNTER — OFFICE VISIT (OUTPATIENT)
Dept: OPTOMETRY | Facility: CLINIC | Age: 74
End: 2024-05-29
Payer: MEDICARE

## 2024-05-29 DIAGNOSIS — H25.13 NUCLEAR SCLEROSIS OF BOTH EYES: ICD-10-CM

## 2024-05-29 DIAGNOSIS — H01.00B BLEPHARITIS OF UPPER AND LOWER EYELIDS OF BOTH EYES, UNSPECIFIED TYPE: ICD-10-CM

## 2024-05-29 DIAGNOSIS — Z13.5 GLAUCOMA SCREENING: ICD-10-CM

## 2024-05-29 DIAGNOSIS — H01.00A BLEPHARITIS OF UPPER AND LOWER EYELIDS OF BOTH EYES, UNSPECIFIED TYPE: ICD-10-CM

## 2024-05-29 DIAGNOSIS — H43.813 POSTERIOR VITREOUS DETACHMENT OF BOTH EYES: Primary | ICD-10-CM

## 2024-05-29 PROCEDURE — 92014 COMPRE OPH EXAM EST PT 1/>: CPT | Mod: S$PBB,,, | Performed by: OPTOMETRIST

## 2024-05-29 PROCEDURE — 99999 PR PBB SHADOW E&M-EST. PATIENT-LVL III: CPT | Mod: PBBFAC,,, | Performed by: OPTOMETRIST

## 2024-05-29 PROCEDURE — 99213 OFFICE O/P EST LOW 20 MIN: CPT | Mod: PBBFAC,PO | Performed by: OPTOMETRIST

## 2024-05-29 NOTE — PROGRESS NOTES
HPI    Pt here for annual exam. Last exam - 1 yr    DVA stable. Wearing +1.50 with relief. C/o floaters OU, no changes. Pt   denies flashes/pain. Pt denies use of gtt.   Last edited by Michelle Nance on 5/29/2024 10:57 AM.            Assessment /Plan     For exam results, see Encounter Report.    Posterior vitreous detachment of both eyes    Nuclear sclerosis of both eyes    Blepharitis of upper and lower eyelids of both eyes, unspecified type    Glaucoma screening         RD precautions given and reviewed. Patient knows to call/ message if any further changes in symptoms occur.  Vis sig NS, not ready for consult, cautions driving mavis at night ---CE 2-3+ yrs   Moderate bleph / MGD, probable rosacea component ---lid hygiene, sheet w/ scrubs to consider qhs or qod---ATs for comfort   Not suspect      Defers refraction   Ok continue otc for near , prn      Discussed and educated patient on current findings /plan.  RTC 1 year, prn if any changes / issues

## 2024-05-29 NOTE — PATIENT INSTRUCTIONS
"DRY EYES -- BURNING OR JAYRO SYMPTOMS:  Use Over The Counter artificial tears as needed for dry eye symptoms.   Some common brands include:  Systane, Optive, Refresh, and Thera-Tears.  These drops can be used as frequently as desired, but may be most helpful use during long periods of concentrated work.  For example, reading / working at the computer. Start with 3-4x per day.     Nighttime Ophthalmic gel or ointments are available: Refresh PM, Genteal, and Lacrilube.    Avoid drops that "get redness out" (Visine, Murine, Clear Eyes), as these may contain medication that could further irritate the eyes, especially with chronic use.    ALLERGY EYES -- ITCHING SYMPTOMS:  Over the counter medications include--Pataday, Zaditor, and Alaway.  Use as directed 1-2 drops daily for symptoms of itching / watering eyes.  These drops will not help for dry eye or exposure symptoms.    REDNESS RELIEF:  Lumify---is a good redness reliever that will not cause irritation if used chronically.        FLASHES / FLOATERS / POSTERIOR VITREOUS DETACHMENT    Call the clinic if you have any further changes in symptoms.  Including:  Increased numbers of floaters or flashing lights, dimness or darkness that moves through or stays constant in your vision, or any pain in the eye (s).    You may sometimes see small specks or clouds moving in your field of vision.  They are called FLOATERS.  You can often see them when looking at a plain background, like a blank wall or blue asa.  Floaters are actually tiny clumps of gel or cells inside the VITREOUS, the clear jelly-like fluid that fills the inside of your eye.    While these objects look like they are in front of your eye, they are actually floating inside.  What you see are the shadows they cast on the RETINA, the nerve layer at the back of the eye that senses light and allows you to see.      POSTERIOR VITREOUS DETACHMENT    The appearance of new floaters may be alarming.  If you suddenly " develop new floaters, you should contact your eye care professional  right away.    The retina can tear if the shrinking vitreous pulls away from the wall of the eye.  This sometimes causes a small amount of bleeding in the eye that may appear as new floaters.    A torn retina is always a serious problem, since it can lead to a retinal detachment.  You should see your eye care professional as soon as possible if:    even one new floater appears suddenly;  you see sudden flashes of light;  you notice other symptoms, like the loss of side vision, or a curtain closes down in your vision        POSTERIOR VITREOUS DETACHMENT is more common for people who:    are nearsighted;  have had cataract surgery;  have had YAG laser surgery of the eye;  have had inflammation inside the eye;  are over age 60.      While some floaters may remain visible, many of them will fade over time and become less noticeable.  Even if you've had some floaters for years, you should have your eyes checked as soon as possible if you notice new ones.    FLASHING LIGHTS    When the vitreous gel rubs or pulls on the retina, you may see what look like flashing lights or lightning streaks.  These flashes can appear off and on for several weeks or months.      Some people experience flashes of light that appear as jagged lines or heat waves in both eyes, lasting 10-20 minutes.  These flashes are caused by a spasm of blood vessels in the brain, which is called a migraine.    If a headache follows these flashes, it's called a migraine headache.  If   no headache occurs, these flashes are called Ophthalmic or Ocular Migraine.           Early Cataracts--not visually significant for surgery consultation.    What Are Cataracts?  A clear lens in the eye focuses light. This lets the eye see images sharply. With age, the lens slowly becomes cloudy. The cloudy lens is a cataract. A cataract scatters light and makes it hard for the eye to focus. Cataracts often  form in both eyes. But one lens may cloud faster than the other.      The Aging of Your Lens    Your lens may cloud so slowly that you don`t notice any vision changes at first. But as the cataract gets worse, the eye has a harder time focusing. In early stages, glasses may help you see better. As the lens gets cloudier, your doctor may recommend surgery to restore your vision.

## 2024-06-03 RX ORDER — ATORVASTATIN CALCIUM 40 MG/1
TABLET, FILM COATED ORAL
Qty: 90 TABLET | Refills: 3 | Status: SHIPPED | OUTPATIENT
Start: 2024-06-03

## 2024-06-06 ENCOUNTER — OFFICE VISIT (OUTPATIENT)
Dept: FAMILY MEDICINE | Facility: CLINIC | Age: 74
End: 2024-06-06
Payer: MEDICARE

## 2024-06-06 ENCOUNTER — PATIENT MESSAGE (OUTPATIENT)
Dept: FAMILY MEDICINE | Facility: CLINIC | Age: 74
End: 2024-06-06
Payer: MEDICARE

## 2024-06-06 VITALS — HEIGHT: 66 IN | TEMPERATURE: 100 F | WEIGHT: 121.06 LBS | BODY MASS INDEX: 19.46 KG/M2

## 2024-06-06 DIAGNOSIS — I70.0 AORTIC ATHEROSCLEROSIS: ICD-10-CM

## 2024-06-06 DIAGNOSIS — U07.1 COVID: Primary | ICD-10-CM

## 2024-06-06 PROCEDURE — 99213 OFFICE O/P EST LOW 20 MIN: CPT | Mod: 95,,, | Performed by: INTERNAL MEDICINE

## 2024-06-06 NOTE — PROGRESS NOTES
Assessment and Plan:    1. COVID  Answered all questions. Out of range of benefit from antiviral and symptoms already improving. Discuss symptomatic treatment. Discussed return to normal activities and duration of recommended isolation. All questions answered.    2. Aortic atherosclerosis  Continue statin, appropriate dose.    ______________________________________________________________________  Subjective:    Chief Complaint:  COVID    HPI:  Mindi is a 73 y.o. year old female patient with telemedicine visit today as consultation for COVID    The patient location is: home in [place]  The chief complaint leading to consultation is: as above    Visit type: audiovisual    Face to Face time with patient: 15 minutes  20 minutes of total time spent on the encounter, which includes face to face time and non-face to face time preparing to see the patient (eg, review of tests), Obtaining and/or reviewing separately obtained history, Documenting clinical information in the electronic or other health record, Independently interpreting results (not separately reported) and communicating results to the patient/family/caregiver, or Care coordination (not separately reported).         Each patient to whom he or she provides medical services by telemedicine is:  (1) informed of the relationship between the physician and patient and the respective role of any other health care provider with respect to management of the patient; and (2) notified that he or she may decline to receive medical services by telemedicine and may withdraw from such care at any time.    Notes:     She reports that her symptoms started 3 days ago. Worst was yesterday, felt significant body aches and fever up to 103.8. Today body aches are lessened. Fever is 100.0 today, has not taken tylenol yet today.  Hoarse voice, but not really sore throat. Has had some rhinorrhea. No real cough. No shortness of breath or wheezing. Tested positive for COVID today on home  test.    Medications:  Current Outpatient Medications on File Prior to Visit   Medication Sig Dispense Refill    atorvastatin (LIPITOR) 40 MG tablet TAKE 1 TABLET(40 MG) BY MOUTH EVERY DAY 90 tablet 3    fluorouraciL (EFUDEX) 5 % cream AAA nose bid x 2 weeks 40 g 1    losartan (COZAAR) 25 MG tablet Take 0.5 tablets (12.5 mg total) by mouth once daily. (Patient not taking: Reported on 5/29/2024) 45 tablet 1    magnesium 30 mg Tab Take 500 mg by mouth once.      multivitamin (THERAGRAN) per tablet Take 1 tablet by mouth as needed.       ZINC ACETATE ORAL Take by mouth.       Current Facility-Administered Medications on File Prior to Visit   Medication Dose Route Frequency Provider Last Rate Last Admin    electrolyte-S (ISOLYTE)   Intravenous Continuous Alonso Shaikh MD        lactated ringers infusion   Intravenous Continuous Alonso Shaikh MD 10 mL/hr at 01/28/20 0730 New Bag at 01/28/20 0812    lidocaine (PF) 10 mg/ml (1%) injection 10 mg  1 mL Intradermal Once Alonso Shaikh MD           Review of Systems:  Review of Systems   Constitutional:  Negative for activity change and unexpected weight change.   HENT:  Positive for rhinorrhea. Negative for hearing loss and trouble swallowing.    Eyes:  Negative for discharge and visual disturbance.   Respiratory:  Negative for chest tightness and wheezing.    Cardiovascular:  Negative for chest pain and palpitations.   Gastrointestinal:  Negative for blood in stool, constipation, diarrhea and vomiting.   Endocrine: Negative for polydipsia and polyuria.   Genitourinary:  Negative for difficulty urinating, dysuria, hematuria and menstrual problem.   Musculoskeletal:  Negative for arthralgias, joint swelling and neck pain.   Neurological:  Negative for weakness and headaches.   Psychiatric/Behavioral:  Negative for confusion and dysphoric mood.        Past Medical History:  Past Medical History:   Diagnosis Date    Aortic atherosclerosis 01/24/2020    Arthritis      "Cataract     OU    GERD (gastroesophageal reflux disease)     Hemorrhoid     HTN (hypertension) 06/24/2013    Hypertension     Mild vitamin D deficiency     ANG on CPAP     Osteopenia     Thyroid disease        Objective:    Vitals:  Vitals:    06/06/24 1445   Temp: 100 °F (37.8 °C)   Weight: 54.9 kg (121 lb 0.5 oz)   Height: 5' 6" (1.676 m)       Physical Exam  Constitutional:       General: She is not in acute distress.     Appearance: She is well-developed.      Comments: hoarse voice   HENT:      Head: Normocephalic and atraumatic.   Pulmonary:      Effort: Pulmonary effort is normal. No respiratory distress.   Neurological:      Mental Status: She is alert and oriented to person, place, and time.   Psychiatric:         Behavior: Behavior normal.         Thought Content: Thought content normal.         Judgment: Judgment normal.         Jeana Carpenter MD  Internal Medicine    "

## 2024-07-03 DIAGNOSIS — Z71.89 COMPLEX CARE COORDINATION: ICD-10-CM

## 2024-08-08 ENCOUNTER — PATIENT MESSAGE (OUTPATIENT)
Dept: FAMILY MEDICINE | Facility: CLINIC | Age: 74
End: 2024-08-08
Payer: MEDICARE

## 2024-08-14 ENCOUNTER — TELEPHONE (OUTPATIENT)
Dept: CARDIOLOGY | Facility: CLINIC | Age: 74
End: 2024-08-14
Payer: MEDICARE

## 2024-09-09 ENCOUNTER — OFFICE VISIT (OUTPATIENT)
Facility: CLINIC | Age: 74
End: 2024-09-09
Payer: MEDICARE

## 2024-09-09 VITALS — BODY MASS INDEX: 19.53 KG/M2 | HEIGHT: 66 IN

## 2024-09-09 DIAGNOSIS — D18.01 CHERRY ANGIOMA: ICD-10-CM

## 2024-09-09 DIAGNOSIS — Z12.83 SCREENING FOR SKIN CANCER: ICD-10-CM

## 2024-09-09 DIAGNOSIS — L81.3 CAFÉ AU LAIT SPOT: ICD-10-CM

## 2024-09-09 DIAGNOSIS — L81.4 LENTIGINES: ICD-10-CM

## 2024-09-09 DIAGNOSIS — L82.1 SK (SEBORRHEIC KERATOSIS): ICD-10-CM

## 2024-09-09 DIAGNOSIS — D22.9 MULTIPLE BENIGN NEVI: Primary | ICD-10-CM

## 2024-09-09 PROCEDURE — 99213 OFFICE O/P EST LOW 20 MIN: CPT | Mod: S$PBB,,, | Performed by: DERMATOLOGY

## 2024-09-09 PROCEDURE — 99212 OFFICE O/P EST SF 10 MIN: CPT | Mod: PBBFAC,PN | Performed by: DERMATOLOGY

## 2024-09-09 PROCEDURE — 99999 PR PBB SHADOW E&M-EST. PATIENT-LVL II: CPT | Mod: PBBFAC,,, | Performed by: DERMATOLOGY

## 2024-09-09 PROCEDURE — G2211 COMPLEX E/M VISIT ADD ON: HCPCS | Mod: S$PBB,,, | Performed by: DERMATOLOGY

## 2024-09-09 NOTE — PROGRESS NOTES
Subjective:      Patient ID:  Mindi Agudelo is a 73 y.o. female who presents for   No chief complaint on file.    HPI    Established patient.  Here today for total body skin exam.   +AK, hx of Efudex field therapy to nose. No personal or known family hx skin cancer.    No specific complaints.     Past Medical History:   Diagnosis Date    Aortic atherosclerosis 01/24/2020    Arthritis     Cataract     OU    GERD (gastroesophageal reflux disease)     Hemorrhoid     HTN (hypertension) 06/24/2013    Hypertension     Mild vitamin D deficiency     ANG on CPAP     Osteopenia     Thyroid disease        Review of Systems   Constitutional:  Negative for fever, chills and fatigue.   Respiratory:  Negative for cough and shortness of breath.    Skin:  Positive for daily sunscreen use, activity-related sunscreen use and wears hat. Negative for itching and rash.       Objective:   Physical Exam   Constitutional: She appears well-developed and well-nourished.   HENT:   Mouth/Throat: Lips normal.    Eyes: Lids are normal.    Neurological: She is alert and oriented to person, place, and time.   Psychiatric: She has a normal mood and affect.   Skin:   Areas Examined (abnormalities noted in diagram):   Scalp / Hair Palpated and Inspected  Head / Face Inspection Performed  Neck Inspection Performed  Chest / Axilla Inspection Performed  Abdomen Inspection Performed  Genitals / Buttocks / Groin Inspection Performed  Back Inspection Performed  RUE Inspected  LUE Inspection Performed  RLE Inspected  LLE Inspection Performed  Nails and Digits Inspection Performed                 Diagram Legend     Erythematous scaling macule/papule c/w actinic keratosis       Vascular papule c/w angioma      Pigmented verrucoid papule/plaque c/w seborrheic keratosis      Yellow umbilicated papule c/w sebaceous hyperplasia      Irregularly shaped tan macule c/w lentigo     1-2 mm smooth white papules consistent with Milia      Movable subcutaneous cyst with  punctum c/w epidermal inclusion cyst      Subcutaneous movable cyst c/w pilar cyst      Firm pink to brown papule c/w dermatofibroma      Pedunculated fleshy papule(s) c/w skin tag(s)      Evenly pigmented macule c/w junctional nevus     Mildly variegated pigmented, slightly irregular-bordered macule c/w mildly atypical nevus      Flesh colored to evenly pigmented papule c/w intradermal nevus       Pink pearly papule/plaque c/w basal cell carcinoma      Erythematous hyperkeratotic cursted plaque c/w SCC      Surgical scar with no sign of skin cancer recurrence      Open and closed comedones      Inflammatory papules and pustules      Verrucoid papule consistent consistent with wart     Erythematous eczematous patches and plaques     Dystrophic onycholytic nail with subungual debris c/w onychomycosis     Umbilicated papule    Erythematous-base heme-crusted tan verrucoid plaque consistent with inflamed seborrheic keratosis     Erythematous Silvery Scaling Plaque c/w Psoriasis     See annotation      Assessment / Plan:        Multiple benign nevi  - Discussed diagnosis, etiology, and benign-nature of condition.  - Reassured; no lesions suspicious for malignancy noted on exam today.   - Recommended routine self examination of skin. Discussed the ABCDEs of melanoma and ugly duckling sign.   - Recommended daily sun protection, including the use of OTC broad-spectrum sunscreen (SPF 30 or greater) and sun-protective clothing.      Lentigines  - Benign; reassured treatment not necessary.   - Recommended daily sun protection, including the use of OTC broad-spectrum sunscreen (SPF 30 or greater) and sun-protective clothing.       SK (seborrheic keratosis)  Cherry angioma  Café au lait spot  - Benign; reassured treatment not necessary.      Screening for skin cancer  - Total body skin examination performed today.  - Findings listed above.   - Recommended routine self examination of skin.    - Recommended daily sun protection,  including the use of OTC broad-spectrum sunscreen (SPF 30 or greater) and sun-protective clothing.             Follow up for annual skin checks, sooner PRN.

## 2024-09-10 ENCOUNTER — OFFICE VISIT (OUTPATIENT)
Dept: FAMILY MEDICINE | Facility: CLINIC | Age: 74
End: 2024-09-10
Payer: MEDICARE

## 2024-09-10 ENCOUNTER — LAB VISIT (OUTPATIENT)
Dept: LAB | Facility: HOSPITAL | Age: 74
End: 2024-09-10
Attending: NURSE PRACTITIONER
Payer: MEDICARE

## 2024-09-10 VITALS
SYSTOLIC BLOOD PRESSURE: 177 MMHG | WEIGHT: 123.44 LBS | HEIGHT: 66 IN | BODY MASS INDEX: 19.84 KG/M2 | DIASTOLIC BLOOD PRESSURE: 80 MMHG | HEART RATE: 71 BPM | OXYGEN SATURATION: 99 %

## 2024-09-10 DIAGNOSIS — I70.0 AORTIC ATHEROSCLEROSIS: ICD-10-CM

## 2024-09-10 DIAGNOSIS — R19.8 ALTERNATING CONSTIPATION AND DIARRHEA: Primary | ICD-10-CM

## 2024-09-10 DIAGNOSIS — I10 ESSENTIAL HYPERTENSION: ICD-10-CM

## 2024-09-10 DIAGNOSIS — R14.0 BLOATING: ICD-10-CM

## 2024-09-10 DIAGNOSIS — R19.7 DIARRHEA, UNSPECIFIED TYPE: ICD-10-CM

## 2024-09-10 DIAGNOSIS — R19.4 BOWEL HABIT CHANGES: ICD-10-CM

## 2024-09-10 DIAGNOSIS — K21.9 GASTROESOPHAGEAL REFLUX DISEASE WITHOUT ESOPHAGITIS: ICD-10-CM

## 2024-09-10 LAB
ALBUMIN SERPL BCP-MCNC: 4 G/DL (ref 3.5–5.2)
ALP SERPL-CCNC: 44 U/L (ref 55–135)
ALT SERPL W/O P-5'-P-CCNC: 10 U/L (ref 10–44)
ANION GAP SERPL CALC-SCNC: 8 MMOL/L (ref 8–16)
AST SERPL-CCNC: 16 U/L (ref 10–40)
BILIRUB SERPL-MCNC: 0.9 MG/DL (ref 0.1–1)
BUN SERPL-MCNC: 14 MG/DL (ref 8–23)
CALCIUM SERPL-MCNC: 10 MG/DL (ref 8.7–10.5)
CHLORIDE SERPL-SCNC: 101 MMOL/L (ref 95–110)
CHOLEST SERPL-MCNC: 157 MG/DL (ref 120–199)
CHOLEST/HDLC SERPL: 2.2 {RATIO} (ref 2–5)
CO2 SERPL-SCNC: 26 MMOL/L (ref 23–29)
CREAT SERPL-MCNC: 0.8 MG/DL (ref 0.5–1.4)
ERYTHROCYTE [DISTWIDTH] IN BLOOD BY AUTOMATED COUNT: 15.3 % (ref 11.5–14.5)
EST. GFR  (NO RACE VARIABLE): >60 ML/MIN/1.73 M^2
GLUCOSE SERPL-MCNC: 97 MG/DL (ref 70–110)
HCT VFR BLD AUTO: 41.8 % (ref 37–48.5)
HDLC SERPL-MCNC: 73 MG/DL (ref 40–75)
HDLC SERPL: 46.5 % (ref 20–50)
HGB BLD-MCNC: 14 G/DL (ref 12–16)
LDLC SERPL CALC-MCNC: 78.2 MG/DL (ref 63–159)
MCH RBC QN AUTO: 29.7 PG (ref 27–31)
MCHC RBC AUTO-ENTMCNC: 33.5 G/DL (ref 32–36)
MCV RBC AUTO: 89 FL (ref 82–98)
NONHDLC SERPL-MCNC: 84 MG/DL
PLATELET # BLD AUTO: 227 K/UL (ref 150–450)
PMV BLD AUTO: 9.9 FL (ref 9.2–12.9)
POTASSIUM SERPL-SCNC: 4.7 MMOL/L (ref 3.5–5.1)
PROT SERPL-MCNC: 7.2 G/DL (ref 6–8.4)
RBC # BLD AUTO: 4.72 M/UL (ref 4–5.4)
SODIUM SERPL-SCNC: 135 MMOL/L (ref 136–145)
TRIGL SERPL-MCNC: 29 MG/DL (ref 30–150)
TSH SERPL DL<=0.005 MIU/L-ACNC: 3.36 UIU/ML (ref 0.4–4)
WBC # BLD AUTO: 5.15 K/UL (ref 3.9–12.7)

## 2024-09-10 PROCEDURE — 84443 ASSAY THYROID STIM HORMONE: CPT | Performed by: NURSE PRACTITIONER

## 2024-09-10 PROCEDURE — 85027 COMPLETE CBC AUTOMATED: CPT | Performed by: NURSE PRACTITIONER

## 2024-09-10 PROCEDURE — 99214 OFFICE O/P EST MOD 30 MIN: CPT | Mod: S$PBB,,, | Performed by: NURSE PRACTITIONER

## 2024-09-10 PROCEDURE — 36415 COLL VENOUS BLD VENIPUNCTURE: CPT | Mod: PN | Performed by: NURSE PRACTITIONER

## 2024-09-10 PROCEDURE — 80053 COMPREHEN METABOLIC PANEL: CPT | Performed by: NURSE PRACTITIONER

## 2024-09-10 PROCEDURE — 80061 LIPID PANEL: CPT | Performed by: NURSE PRACTITIONER

## 2024-09-10 PROCEDURE — 99999 PR PBB SHADOW E&M-EST. PATIENT-LVL IV: CPT | Mod: PBBFAC,,, | Performed by: NURSE PRACTITIONER

## 2024-09-10 PROCEDURE — 99214 OFFICE O/P EST MOD 30 MIN: CPT | Mod: PBBFAC,PN | Performed by: NURSE PRACTITIONER

## 2024-09-10 RX ORDER — OMEPRAZOLE 20 MG/1
20 CAPSULE, DELAYED RELEASE ORAL DAILY
Qty: 90 CAPSULE | Refills: 0 | Status: SHIPPED | OUTPATIENT
Start: 2024-09-10 | End: 2025-09-10

## 2024-09-10 RX ORDER — LOSARTAN POTASSIUM 25 MG/1
12.5 TABLET ORAL DAILY
Qty: 45 TABLET | Refills: 1 | Status: SHIPPED | OUTPATIENT
Start: 2024-09-10 | End: 2025-09-10

## 2024-09-10 NOTE — PATIENT INSTRUCTIONS
Increase daily fiber intake-  Metamucil fiber gummies-  start with 1  gummy daily-  increase to recommended daily dose.   Increase daily water intake    Keep food diary-  to help identify triggers   May try daily probiotic

## 2024-09-10 NOTE — PROGRESS NOTES
THIS DOCUMENT WAS MADE IN PART WITH VOICE RECOGNITION SOFTWARE.  OCCASIONALLY THIS SOFTWARE WILL MISINTERPRET WORDS OR PHRASES.     Assessment and Plan:    Alternating constipation and diarrhea  Comments:  advised on diet modifications    continue  probiotic   increase daily fiber intake   increase hydration   keep food diary to identify triggers  Orders:  -     CBC Without Differential; Future; Expected date: 09/10/2024  -     Comprehensive Metabolic Panel; Future; Expected date: 09/10/2024    Essential hypertension  Comments:  resume taking losartan 12.5mg   monitor BP, keep log   we will review log-   adjust medication as needed  b/p ck in 2 weeks  Orders:  -     CBC Without Differential; Future; Expected date: 09/10/2024  -     Comprehensive Metabolic Panel; Future; Expected date: 09/10/2024  -     TSH; Future; Expected date: 09/10/2024  -     losartan (COZAAR) 25 MG tablet; Take 0.5 tablets (12.5 mg total) by mouth once daily.  Dispense: 45 tablet; Refill: 1    Gastroesophageal reflux disease without esophagitis  Comments:  continue with diet modifications   omeprazole daily  Orders:  -     omeprazole (PRILOSEC) 20 MG capsule; Take 1 capsule (20 mg total) by mouth once daily.  Dispense: 90 capsule; Refill: 0    Aortic atherosclerosis  Comments:  stable   continue atorvastatin   keep follow up with Cardiology  Orders:  -     Lipid Panel; Future; Expected date: 09/10/2024    Bowel habit changes    Bloating  -     omeprazole (PRILOSEC) 20 MG capsule; Take 1 capsule (20 mg total) by mouth once daily.  Dispense: 90 capsule; Refill: 0             Visit summary:    Advised on diet and lifestyle interventions to help   relieved GI symptoms and promote regularity.     Will check labs    Blood pressure greatly elevated in clinic-  asymptomatic-  recommend patient resume taking losartan.  She will resume monitoring blood pressure daily,  keep log. She will send message via the portal if b/p is not within goal after  resuming  losartan. Will  titrate needed.     Emergent conditions/ ER precautions discussed        Follow up in about 4 weeks (around 10/8/2024).   ______________________________________________________________________  Subjective:    Chief Complaint:  Alternating bowel habits    HPI:  Mindi is a 73 y.o. year old female with a past medical history noted below,  here concerned regarding alternating constipation and diarrhea for several months.  She also reports associated  bloating.  Patient reports bloating worse  after meals.  Feels like  symptoms become more  frequent while she was in [place] last month.  She notices that certain foods exacerbate- she has changed her diet-   omitting gluten. She has noticed some improvement over the past week since changing diet and starting Multizyme supplement Drenamin  dietary supplement.  Supplements recommended by  naturopath     HTN: patient reports Dr. Slaughter- took off  Losartan-  due to b/p controlled. hasn't been monitoring   recently.  She was participating in digital  HTN    ANG- She had been diagnosed with ANG in 2013, has lost 30 lbs since then. Recently did a home sleep study that did not show any apneic episodes.     Multinodular goiter- Seen Dr. Walsh. Had FNA in 2020. Normal TFTs, not on replacement.     Aortic atherosclerosis- On atorvastatin 40 mg. Tolerating well     Osteopenia- Lower risk of fracture on DEXA from 2017. Due for recheck at this point, but she reports that she does not want to           Medications:  Current Outpatient Medications on File Prior to Visit   Medication Sig Dispense Refill    atorvastatin (LIPITOR) 40 MG tablet TAKE 1 TABLET(40 MG) BY MOUTH EVERY DAY 90 tablet 3    magnesium 30 mg Tab Take 500 mg by mouth once.      multivitamin (THERAGRAN) per tablet Take 1 tablet by mouth as needed.       ZINC ACETATE ORAL Take by mouth.      [DISCONTINUED] fluorouraciL (EFUDEX) 5 % cream AAA nose bid x 2 weeks 40 g 1    [DISCONTINUED] losartan  "(COZAAR) 25 MG tablet Take 0.5 tablets (12.5 mg total) by mouth once daily. (Patient not taking: Reported on 5/29/2024) 45 tablet 1     Current Facility-Administered Medications on File Prior to Visit   Medication Dose Route Frequency Provider Last Rate Last Admin    electrolyte-S (ISOLYTE)   Intravenous Continuous Alonso Shaikh MD        lactated ringers infusion   Intravenous Continuous Alonso Shaikh MD 10 mL/hr at 01/28/20 0730 New Bag at 01/28/20 0812    lidocaine (PF) 10 mg/ml (1%) injection 10 mg  1 mL Intradermal Once Alonso Shaikh MD           Review of Systems:  Review of Systems   Constitutional:  Negative for activity change, fatigue, fever and unexpected weight change.   HENT:  Negative for hearing loss, rhinorrhea and trouble swallowing.    Eyes:  Negative for discharge and visual disturbance.   Respiratory:  Negative for cough, chest tightness and wheezing.    Cardiovascular:  Negative for chest pain and palpitations.   Gastrointestinal:  Positive for constipation and diarrhea. Negative for abdominal distention, abdominal pain, blood in stool, nausea and vomiting.   Endocrine: Negative for polydipsia and polyuria.   Genitourinary:  Negative for difficulty urinating, dysuria, hematuria and menstrual problem.   Musculoskeletal:  Negative for arthralgias, joint swelling and neck pain.   Neurological:  Negative for weakness and headaches.   Psychiatric/Behavioral:  Negative for dysphoric mood.        Past Medical History:  Past Medical History:   Diagnosis Date    Aortic atherosclerosis 01/24/2020    Arthritis     Cataract     OU    GERD (gastroesophageal reflux disease)     Hemorrhoid     HTN (hypertension) 06/24/2013    Hypertension     Mild vitamin D deficiency     ANG on CPAP     Osteopenia     Thyroid disease        Objective:    Vitals:  Vitals:    09/10/24 0828 09/10/24 0843   BP: (!) 182/72 (!) 177/80   Pulse: 71    SpO2: 99%    Weight: 56 kg (123 lb 7.3 oz)    Height: 5' 6" (1.676 m)  "   PainSc: 0-No pain        Physical Exam  Vitals and nursing note reviewed.   Constitutional:       General: She is not in acute distress.  HENT:      Head: Normocephalic and atraumatic.      Nose: Nose normal.      Mouth/Throat:      Mouth: Mucous membranes are moist.      Pharynx: Oropharynx is clear.   Eyes:      General: No scleral icterus.     Conjunctiva/sclera: Conjunctivae normal.   Cardiovascular:      Rate and Rhythm: Normal rate and regular rhythm.   Pulmonary:      Effort: Pulmonary effort is normal. No respiratory distress.      Breath sounds: Normal breath sounds.   Abdominal:      General: Bowel sounds are normal. There is no distension.      Palpations: Abdomen is soft.      Tenderness: There is no abdominal tenderness.   Musculoskeletal:      Cervical back: Neck supple.      Right lower leg: No edema.      Left lower leg: No edema.   Skin:     General: Skin is warm and dry.   Neurological:      Mental Status: She is alert and oriented to person, place, and time.   Psychiatric:         Mood and Affect: Mood normal.         Behavior: Behavior normal.         Thought Content: Thought content normal.         Data:  The 10-year ASCVD risk score (Rupesh DK, et al., 2019) is: 26%    Values used to calculate the score:      Age: 73 years      Sex: Female      Is Non- : No      Diabetic: No      Tobacco smoker: No      Systolic Blood Pressure: 177 mmHg      Is BP treated: Yes      HDL Cholesterol: 82 mg/dL      Total Cholesterol: 174 mg/dL       Medical history reviewed, Medications reconciled.          SONIA BennettC  Family Medicine

## 2024-10-09 ENCOUNTER — PATIENT MESSAGE (OUTPATIENT)
Dept: CARDIOLOGY | Facility: CLINIC | Age: 74
End: 2024-10-09
Payer: MEDICARE

## 2024-12-12 DIAGNOSIS — R14.0 BLOATING: ICD-10-CM

## 2024-12-12 DIAGNOSIS — K21.9 GASTROESOPHAGEAL REFLUX DISEASE WITHOUT ESOPHAGITIS: ICD-10-CM

## 2024-12-12 NOTE — TELEPHONE ENCOUNTER
No care due was identified.  [facility] Embedded Care Due Messages. Reference number: [id number].   12/12/2024 12:59:46 PM CST

## 2024-12-12 NOTE — TELEPHONE ENCOUNTER
Refill Routing Note   Medication(s) are not appropriate for processing by [facility] for the following reason(s):        No active prescription written by provider    [facility] action(s):  Defer        Medication Therapy Plan: Last ordered: 3 months ago (9/10/2024) by MADISON Bennett      Appointments  past 12m or future 3m with PCP    Date Provider   Last Visit   6/6/2024 Jeana Carpenter MD   Next Visit   Visit date not found Jeana Carpenter MD   ED visits in past 90 days: 0        Note composed:1:01 PM 12/12/2024

## 2024-12-13 ENCOUNTER — PATIENT MESSAGE (OUTPATIENT)
Dept: ENDOCRINOLOGY | Facility: CLINIC | Age: 74
End: 2024-12-13
Payer: MEDICARE

## 2024-12-13 RX ORDER — OMEPRAZOLE 20 MG/1
CAPSULE, DELAYED RELEASE ORAL
Qty: 90 CAPSULE | Refills: 1 | Status: SHIPPED | OUTPATIENT
Start: 2024-12-13

## 2024-12-16 DIAGNOSIS — M85.80 OSTEOPENIA, UNSPECIFIED LOCATION: ICD-10-CM

## 2024-12-16 DIAGNOSIS — E04.2 MULTIPLE THYROID NODULES: Primary | ICD-10-CM

## 2024-12-23 ENCOUNTER — PATIENT MESSAGE (OUTPATIENT)
Dept: ADMINISTRATIVE | Facility: OTHER | Age: 74
End: 2024-12-23
Payer: MEDICARE

## 2025-01-02 NOTE — PROGRESS NOTES
Subjective:    Patient ID:  Mindi Agudelo is a 74 y.o. female who presents for follow-up of No chief complaint on file.      TELEMEDICINE VISIT      Problem List Items Addressed This Visit          Cardiac/Vascular    Aortic atherosclerosis    Essential hypertension - Primary       HPI    Patient was last seen on 07/20/2023 at which time echocardiogram was ordered which showed preserved ejection fraction without acute abnormalities.    Patient presents for telemedicine visit.    History of Present Illness    CHIEF COMPLAINT:  Ms. Agudelo presents today for follow-up.    EXERCISE:  She maintains an active lifestyle, exercising six days a week which includes walking four miles and biking 10 miles. She recently hiked at 8,000 feet elevation in [place] without difficulties.    BLOOD PRESSURE:  Her blood pressure averages 118/62.    MEDICATIONS:  She takes atorvastatin 40 mg daily for cholesterol management. She is not taking losartan.    Parts of this note were transcribed using voice recognition and generative artificial intelligence software ([employer]).  Please excuse any grammatical or syntax errors and reach out to me with any questions or clarifications needed.            Last 5 Patient Entered Readings                Current 30 Day Average: 118/62  Recent Readings 1/2/2025 1/1/2025 12/31/2024 12/31/2024 12/29/2024   SBP (mmHg) 120 107 120 118 119   DBP (mmHg) 66 65 62 70 65   Pulse 58 58 60 57 58                   Objective:   There were no vitals filed for this visit.    BP Readings from Last 5 Encounters:   09/10/24 (!) 177/80   09/27/23 (!) 157/79   06/06/23 138/82   10/14/22 (!) 148/86   09/20/22 (!) 176/80        Physical Exam  Constitutional:       General: She is not in acute distress.     Appearance: She is well-developed. She is not diaphoretic.   HENT:      Head: Normocephalic and atraumatic.   Eyes:      General: No scleral icterus.     Conjunctiva/sclera: Conjunctivae normal.   Pulmonary:       Effort: No respiratory distress.      Breath sounds: No stridor.   Musculoskeletal:         General: No signs of injury.      Cervical back: Normal range of motion.   Skin:     Coloration: Skin is not jaundiced.   Neurological:      Mental Status: She is alert. Mental status is at baseline.   Psychiatric:         Mood and Affect: Mood normal.         Behavior: Behavior normal.             Current Outpatient Medications   Medication Instructions    atorvastatin (LIPITOR) 40 MG tablet TAKE 1 TABLET(40 MG) BY MOUTH EVERY DAY    magnesium 500 mg, Oral, Once    multivitamin (THERAGRAN) per tablet 1 tablet, Oral, As needed (PRN)    omeprazole (PRILOSEC) 20 MG capsule TAKE 1 CAPSULE(20 MG) BY MOUTH DAILY       Lipid Panel:   Lab Results   Component Value Date    CHOL 157 09/10/2024    HDL 73 09/10/2024    LDLCALC 78.2 09/10/2024    TRIG 29 (L) 09/10/2024    CHOLHDL 46.5 09/10/2024       The 10-year ASCVD risk score (Rupesh DAVID, et al., 2019) is: 21.9%    Values used to calculate the score:      Age: 74 years      Sex: Female      Is Non- : No      Diabetic: No      Tobacco smoker: No      Systolic Blood Pressure: 177 mmHg      Is BP treated: No      HDL Cholesterol: 73 mg/dL      Total Cholesterol: 157 mg/dL    Most Recent EKG Results  Results for orders placed or performed in visit on 06/06/23   IN OFFICE EKG 12-LEAD (to [place])    Collection Time: 06/06/23  9:41 AM    Narrative    Test Reason : I49.9,    Vent. Rate : 058 BPM     Atrial Rate : 058 BPM     P-R Int : 154 ms          QRS Dur : 086 ms      QT Int : 438 ms       P-R-T Axes : 065 -18 062 degrees     QTc Int : 429 ms    Sinus bradycardia  Otherwise normal ECG  When compared with ECG of 19-JUN-2020 14:44,  No significant change was found  Confirmed by Will Mitchell MD ([id number]) on 6/6/2023 3:12:05 PM    Referred By:             Confirmed By:Will Mitchell MD       Most Recent Echocardiogram Results  Results for orders placed in visit on  10/13/23    Echo    Interpretation Summary    Left Ventricle: The left ventricle is normal in size. Normal wall thickness. There is concentric remodeling. Normal wall motion. There is normal systolic function. Ejection fraction by visual approximation is 65%. Grade II diastolic dysfunction.    Left Atrium: Left atrium is dilated. The left atrium volume index MOD is 40.1 mL/m2.    Right Ventricle: Normal right ventricular cavity size. Wall thickness is normal. Right ventricle wall motion  is normal. Systolic function is normal.    Tricuspid Valve: There is mild regurgitation.    Pulmonary Artery: The estimated pulmonary artery systolic pressure is 35 mmHg.    IVC/SVC: Normal venous pressure at 3 mmHg.      Most Recent Nuclear Stress Test Results  No results found for this or any previous visit.      Most Recent Cardiac PET Stress Test Results  No results found for this or any previous visit.      Most Recent Cardiovascular Angiogram results  No results found for this or any previous visit.      Other Most Recent Cardiology Results  No results found for this or any previous visit.        All pertinent data including labs, imaging, EKGs, and studies listed above were reviewed.  Patient's most recent EKG tracing was personally interpreted by this provider.    Diagnoses:       1. Essential hypertension    2. Aortic atherosclerosis         Plan for treatment of the above diagnoses:     Assessment & Plan    Patient stable with no chest pain or shortness of breath; demonstrated good exercise tolerance during recent  trip  Blood pressure well-controlled with 30-day average of 118/62  Cholesterol well-managed on current statin therapy  Recent cardiac ultrasound (October 2023) showed good heart function with no concerns  Discontinued losartan as patient reports not taking it and blood pressure remains well-controlled  Continued statin therapy due to good cholesterol control and absence of significant side  effects  Advised 6-week course of omeprazole for current GI symptoms  Suggested consideration of upper endoscopy if GI symptoms recur after omeprazole course    Discussed potential need for further GI evaluation if symptoms persist after omeprazole treatment.  Emphasized importance of consistent exercise in maintaining health and independence in later years.  Ms. Agudelo to continue current exercise regimen of walking 4 miles and biking 10 miles, averaging 6 days a week.  Continued atorvastatin 40 mg by mouth daily.  Discontinued losartan 12.5 mg.  Follow up in approximately 1 year.  Contact the office if any changes in condition occur before next scheduled visit.         Continue atorvastatin 40 mg PO Daily     Continue other cardiac medications  Mediterranean Diet/Cardiovascular Exercise Program    Visit today included increased complexity associated with the care of the episodic problem(s) addressed above in addition to managing the longitudinal care of the patient due to the serious and/or complex managed problem(s) listed above.    Patient queried and all questions were answered.    Parts of this note were transcribed using voice recognition and generative artificial intelligence software ([employer]).  Please excuse any grammatical or syntax errors and reach out to me with any questions or clarifications needed.    F/u in 1 year to reassess    The patient location is: [place]   The chief complaint leading to consultation is:  Please see problem list above  Visit type: Virtual visit with synchronous audio and video  Total time spent with patient: 15 minutes   Each patient to whom he or she provides medical services by telemedicine is:  (1) informed of the relationship between the physician and patient and the respective role of any other health care provider with respect to management of the patient; and (2) notified that he or she may decline to receive medical services by telemedicine and may  withdraw from such care at any time.    Notes: See above       Signed:    Sukhjinder Pickering MD  1/3/2025 4:06 PM

## 2025-01-03 ENCOUNTER — OFFICE VISIT (OUTPATIENT)
Dept: CARDIOLOGY | Facility: CLINIC | Age: 75
End: 2025-01-03
Payer: MEDICARE

## 2025-01-03 ENCOUNTER — PATIENT MESSAGE (OUTPATIENT)
Dept: CARDIOLOGY | Facility: CLINIC | Age: 75
End: 2025-01-03

## 2025-01-03 DIAGNOSIS — I10 ESSENTIAL HYPERTENSION: Primary | ICD-10-CM

## 2025-01-03 DIAGNOSIS — I70.0 AORTIC ATHEROSCLEROSIS: ICD-10-CM

## 2025-02-22 DIAGNOSIS — Z00.00 ENCOUNTER FOR MEDICARE ANNUAL WELLNESS EXAM: ICD-10-CM

## 2025-02-24 ENCOUNTER — RESULTS FOLLOW-UP (OUTPATIENT)
Dept: ENDOCRINOLOGY | Facility: CLINIC | Age: 75
End: 2025-02-24

## 2025-02-27 ENCOUNTER — LAB VISIT (OUTPATIENT)
Dept: LAB | Facility: HOSPITAL | Age: 75
End: 2025-02-27
Payer: MEDICARE

## 2025-02-27 DIAGNOSIS — M85.80 OSTEOPENIA, UNSPECIFIED LOCATION: ICD-10-CM

## 2025-02-27 DIAGNOSIS — E04.2 MULTIPLE THYROID NODULES: ICD-10-CM

## 2025-02-27 LAB
ALBUMIN SERPL BCP-MCNC: 3.8 G/DL (ref 3.5–5.2)
ALP SERPL-CCNC: 38 U/L (ref 40–150)
ALT SERPL W/O P-5'-P-CCNC: 8 U/L (ref 10–44)
ANION GAP SERPL CALC-SCNC: 8 MMOL/L (ref 8–16)
AST SERPL-CCNC: 24 U/L (ref 10–40)
BILIRUB SERPL-MCNC: 0.8 MG/DL (ref 0.1–1)
BUN SERPL-MCNC: 15 MG/DL (ref 8–23)
CALCIUM SERPL-MCNC: 9.6 MG/DL (ref 8.7–10.5)
CHLORIDE SERPL-SCNC: 106 MMOL/L (ref 95–110)
CO2 SERPL-SCNC: 26 MMOL/L (ref 23–29)
CREAT SERPL-MCNC: 0.8 MG/DL (ref 0.5–1.4)
EST. GFR  (NO RACE VARIABLE): >60 ML/MIN/1.73 M^2
GLUCOSE SERPL-MCNC: 88 MG/DL (ref 70–110)
POTASSIUM SERPL-SCNC: 4.3 MMOL/L (ref 3.5–5.1)
PROT SERPL-MCNC: 7 G/DL (ref 6–8.4)
SODIUM SERPL-SCNC: 140 MMOL/L (ref 136–145)
TSH SERPL DL<=0.005 MIU/L-ACNC: 2.15 UIU/ML (ref 0.4–4)

## 2025-02-27 PROCEDURE — 36415 COLL VENOUS BLD VENIPUNCTURE: CPT | Mod: PN | Performed by: INTERNAL MEDICINE

## 2025-02-27 PROCEDURE — 84443 ASSAY THYROID STIM HORMONE: CPT | Performed by: INTERNAL MEDICINE

## 2025-02-27 PROCEDURE — 80053 COMPREHEN METABOLIC PANEL: CPT | Performed by: INTERNAL MEDICINE

## 2025-02-28 ENCOUNTER — OFFICE VISIT (OUTPATIENT)
Dept: ENDOCRINOLOGY | Facility: CLINIC | Age: 75
End: 2025-02-28
Payer: MEDICARE

## 2025-02-28 VITALS
HEART RATE: 80 BPM | OXYGEN SATURATION: 99 % | WEIGHT: 128.31 LBS | DIASTOLIC BLOOD PRESSURE: 62 MMHG | BODY MASS INDEX: 20.62 KG/M2 | HEIGHT: 66 IN | SYSTOLIC BLOOD PRESSURE: 111 MMHG

## 2025-02-28 DIAGNOSIS — E04.2 MULTIPLE THYROID NODULES: Primary | ICD-10-CM

## 2025-02-28 DIAGNOSIS — I10 BENIGN ESSENTIAL HTN: ICD-10-CM

## 2025-02-28 PROCEDURE — 99213 OFFICE O/P EST LOW 20 MIN: CPT | Mod: PBBFAC,PN | Performed by: INTERNAL MEDICINE

## 2025-02-28 PROCEDURE — 99999 PR PBB SHADOW E&M-EST. PATIENT-LVL III: CPT | Mod: PBBFAC,,, | Performed by: INTERNAL MEDICINE

## 2025-02-28 NOTE — PROGRESS NOTES
CHIEF COMPLAINT: Multinodular Goiter  74 y.o. being seen as a f/u. Benign left FNA 1/7/2016. No XRT to head/neck. No difficulty swallowing. With increase in GERD has noticed some change in voice. No palpitations. No tremors.               1/7/16  FINAL PATHOLOGIC DIAGNOSIS  Left thyroid, fine-needle aspiration:  [facility] System Thyroid Cytology Category: Benign.  Benign follicular cells and some colloid are present.        PAST MEDICAL HISTORY/PAST SURGICAL HISTORY:  Reviewed in [facility]     SOCIAL HISTORY: No T/A     FAMILY HISTORY:  Father was on T4 replacement. No thyroid cancer. + DM 2.      MEDICATIONS/ALLERGIES: The patient's MedCard has been updated and reviewed.            PE:    GENERAL: Well developed, well nourished.  NECK: Supple, trachea midline, Left nodule palpabel  CHEST: Resp even and unlabored, CTA bilateral.  CARDIAC: RRR, S1, S2 heard, no murmurs, rubs, S3, or S4         Latest Reference Range & Units 02/27/25 09:45   TSH 0.400 - 4.000 uIU/mL 2.150      Latest Reference Range & Units 02/27/25 09:45   Calcium 8.7 - 10.5 mg/dL 9.6     US THYROID     CLINICAL HISTORY:  Nontoxic multinodular goiter     TECHNIQUE:  Ultrasound of the thyroid was performed.     COMPARISON:  10/03/2023     FINDINGS:  Right thyroid lobe measures 4.1 x 1.1 x 1.2 cm.  There is a possible extrathyroidal slightly hypoechoic nodule posterior to the thyroid gland at the mid aspect measuring 6 mm.     Left thyroid lobe measures 5.6 x 2.6 x 3.2 cm.  There is a dominant primarily isoechoic nodule measuring 4.4 cm in length.  Stable from the prior.     Impression:     Dominant nodule on the left which meets imaging criteria for fine-needle aspiration, stable compared to the prior exam.     Possible extrathyroidal nodule 6 mm posterior to the right thyroid lobe, either representing an exophytic thyroid nodule, or parathyroid nodule or gland.  In retrospect, possibly present on the prior.     Follow-up as clinically indicated.      ASSESSMENT/PLAN:  1. Thyroid Nodule- Benign Left FNA in 2016.  No XRT.  Denies any obstructive symptoms.  Ultrasound shows no significant change.  At this point can repeat in 2 years.  Monitor calcium as there is question of a possible parathyroid seen on ultrasound.     2. HTN- BP controlled at home. Often times high in clinic. Reviewed BP logs. . Currently in digital HTN program.       FOLLOWUP  F/U 2 yr TSH, Thyroid Ultrasound, CMP

## 2025-03-16 DIAGNOSIS — I10 ESSENTIAL HYPERTENSION: ICD-10-CM

## 2025-03-17 RX ORDER — LOSARTAN POTASSIUM 25 MG/1
TABLET ORAL
Qty: 45 TABLET | Refills: 1 | OUTPATIENT
Start: 2025-03-17

## 2025-03-17 NOTE — TELEPHONE ENCOUNTER
Refill Decision Note   Mindi Agudelo  is requesting a refill authorization.    Brief Assessment and Rationale for Refill:   Quick Discontinue       Medication Therapy Plan:   discontinued on 1/3/2025 by Sukhjinder Pickering MD      Comments:     Note composed:1:13 AM 03/17/2025

## 2025-05-21 DIAGNOSIS — Z12.31 ENCOUNTER FOR SCREENING MAMMOGRAM FOR MALIGNANT NEOPLASM OF BREAST: Primary | ICD-10-CM

## 2025-05-22 ENCOUNTER — PATIENT MESSAGE (OUTPATIENT)
Dept: OBSTETRICS AND GYNECOLOGY | Facility: CLINIC | Age: 75
End: 2025-05-22
Payer: MEDICARE

## 2025-06-01 ENCOUNTER — PATIENT MESSAGE (OUTPATIENT)
Facility: CLINIC | Age: 75
End: 2025-06-01
Payer: MEDICARE

## 2025-06-03 DIAGNOSIS — I10 ESSENTIAL HYPERTENSION: Primary | ICD-10-CM

## 2025-06-03 RX ORDER — ATORVASTATIN CALCIUM 40 MG/1
40 TABLET, FILM COATED ORAL
Qty: 90 TABLET | Refills: 3 | Status: SHIPPED | OUTPATIENT
Start: 2025-06-03

## 2025-06-04 ENCOUNTER — OFFICE VISIT (OUTPATIENT)
Facility: CLINIC | Age: 75
End: 2025-06-04
Payer: MEDICARE

## 2025-06-04 DIAGNOSIS — L72.0 MILIUM: Primary | ICD-10-CM

## 2025-06-04 PROCEDURE — G2211 COMPLEX E/M VISIT ADD ON: HCPCS | Mod: S$PBB,,, | Performed by: DERMATOLOGY

## 2025-06-04 PROCEDURE — 99212 OFFICE O/P EST SF 10 MIN: CPT | Mod: S$PBB,,, | Performed by: DERMATOLOGY

## 2025-06-10 ENCOUNTER — OFFICE VISIT (OUTPATIENT)
Dept: OPTOMETRY | Facility: CLINIC | Age: 75
End: 2025-06-10
Payer: MEDICARE

## 2025-06-10 DIAGNOSIS — Z13.5 GLAUCOMA SCREENING: ICD-10-CM

## 2025-06-10 DIAGNOSIS — H25.13 NUCLEAR SCLEROSIS OF BOTH EYES: ICD-10-CM

## 2025-06-10 DIAGNOSIS — H43.813 POSTERIOR VITREOUS DETACHMENT OF BOTH EYES: Primary | ICD-10-CM

## 2025-06-10 PROCEDURE — 99999 PR PBB SHADOW E&M-EST. PATIENT-LVL III: CPT | Mod: PBBFAC,,, | Performed by: OPTOMETRIST

## 2025-06-10 PROCEDURE — 92014 COMPRE OPH EXAM EST PT 1/>: CPT | Mod: S$PBB,,, | Performed by: OPTOMETRIST

## 2025-06-10 PROCEDURE — 99213 OFFICE O/P EST LOW 20 MIN: CPT | Mod: PBBFAC,PO | Performed by: OPTOMETRIST

## 2025-06-10 NOTE — PROGRESS NOTES
HPI    Pt presents today for a dfe.  Pt states she feels like she needs more light at night.  Pt wears OTC readers; +1.75 to +2.00.  Pt reports that this am, she got hand soap in [place]. Pt states she flushed [place]   out w/ water.  Does not instill gtts.  Denies ocular pain/disc.  Denies F/F.  Last edited by Ramona Pantoja on 6/10/2025 10:27 AM.            Assessment /Plan     For exam results, see Encounter Report.    Posterior vitreous detachment of both eyes    Nuclear sclerosis of both eyes    Glaucoma screening      Stable OU --RD precautions given and reviewed. Patient knows to call/ message if any further changes in symptoms occur.  Vis sig NS cataracts, OU. Not ready for consult, gave info, cautions driving especially at night. CE possible in 1-2 yrs   Not suspect     Mild spk noted OD // no new dry eye complaint     Defers refraction // ok continue with otc for near     Discussed and educated patient on current findings /plan.  RTC 1 year, prn if any changes / issues

## 2025-08-11 ENCOUNTER — OFFICE VISIT (OUTPATIENT)
Facility: CLINIC | Age: 75
End: 2025-08-11
Payer: MEDICARE

## 2025-08-11 DIAGNOSIS — D22.9 MULTIPLE BENIGN NEVI: Primary | ICD-10-CM

## 2025-08-11 DIAGNOSIS — L72.0 MILIA: ICD-10-CM

## 2025-08-11 DIAGNOSIS — Z12.83 SCREENING FOR SKIN CANCER: ICD-10-CM

## 2025-08-11 DIAGNOSIS — D18.01 CHERRY ANGIOMA: ICD-10-CM

## 2025-08-11 DIAGNOSIS — L81.4 LENTIGINES: ICD-10-CM

## 2025-08-11 DIAGNOSIS — L81.3 CAFÉ AU LAIT SPOT: ICD-10-CM

## 2025-08-11 DIAGNOSIS — L82.1 SK (SEBORRHEIC KERATOSIS): ICD-10-CM

## 2025-08-11 PROCEDURE — G2211 COMPLEX E/M VISIT ADD ON: HCPCS | Mod: S$PBB,,, | Performed by: DERMATOLOGY

## 2025-08-11 PROCEDURE — 99999 PR PBB SHADOW E&M-EST. PATIENT-LVL II: CPT | Mod: PBBFAC,,, | Performed by: DERMATOLOGY

## 2025-08-11 PROCEDURE — 99212 OFFICE O/P EST SF 10 MIN: CPT | Mod: PBBFAC,PN | Performed by: DERMATOLOGY

## 2025-08-11 PROCEDURE — 99213 OFFICE O/P EST LOW 20 MIN: CPT | Mod: S$PBB,,, | Performed by: DERMATOLOGY

## (undated) DEVICE — Device

## (undated) DEVICE — BIT DRILL AO 2.6X135MM SCALED

## (undated) DEVICE — SCREW BONE NON LOCK 3.5X34MM
Type: IMPLANTABLE DEVICE | Site: FEMUR | Status: NON-FUNCTIONAL
Removed: 2020-01-28

## (undated) DEVICE — BIT DRILL ASNIS III 3.2X300MM